# Patient Record
Sex: FEMALE | Race: BLACK OR AFRICAN AMERICAN | Employment: UNEMPLOYED | URBAN - METROPOLITAN AREA
[De-identification: names, ages, dates, MRNs, and addresses within clinical notes are randomized per-mention and may not be internally consistent; named-entity substitution may affect disease eponyms.]

---

## 2017-01-26 ENCOUNTER — OFFICE VISIT (OUTPATIENT)
Dept: CARDIOLOGY CLINIC | Age: 82
End: 2017-01-26

## 2017-01-26 VITALS
SYSTOLIC BLOOD PRESSURE: 147 MMHG | HEIGHT: 64 IN | HEART RATE: 58 BPM | WEIGHT: 182 LBS | BODY MASS INDEX: 31.07 KG/M2 | DIASTOLIC BLOOD PRESSURE: 59 MMHG

## 2017-01-26 DIAGNOSIS — E78.5 HYPERLIPIDEMIA, UNSPECIFIED HYPERLIPIDEMIA TYPE: ICD-10-CM

## 2017-01-26 DIAGNOSIS — E11.9 TYPE 2 DIABETES MELLITUS WITHOUT COMPLICATION, WITH LONG-TERM CURRENT USE OF INSULIN (HCC): ICD-10-CM

## 2017-01-26 DIAGNOSIS — I10 ESSENTIAL HYPERTENSION, BENIGN: ICD-10-CM

## 2017-01-26 DIAGNOSIS — Z79.4 TYPE 2 DIABETES MELLITUS WITHOUT COMPLICATION, WITH LONG-TERM CURRENT USE OF INSULIN (HCC): ICD-10-CM

## 2017-01-26 DIAGNOSIS — I05.9 MITRAL VALVE DISORDER: Primary | ICD-10-CM

## 2017-01-26 DIAGNOSIS — I47.29 NSVT (NONSUSTAINED VENTRICULAR TACHYCARDIA): ICD-10-CM

## 2017-01-26 RX ORDER — INDAPAMIDE 1.25 MG/1
TABLET, FILM COATED ORAL DAILY
COMMUNITY
End: 2020-01-01

## 2017-01-26 RX ORDER — DOXAZOSIN 2 MG/1
2 TABLET ORAL
COMMUNITY
End: 2020-01-01

## 2017-01-26 NOTE — PROGRESS NOTES
HISTORY OF PRESENT ILLNESS  Aura Lubin is a 80 y.o. female. HPI Comments: Patient with mr,nsvt,htn,hyperlipidemia. On follow up patient denies any chest pains,sob, palpitation or other significant symptoms. Hypertension   The history is provided by the patient. This is a chronic problem. The problem occurs constantly. The problem has been gradually worsening. Associated symptoms include shortness of breath. Pertinent negatives include no chest pain, no abdominal pain and no headaches. Valvular Heart Disease   This is a chronic problem. The problem occurs constantly. The problem has not changed since onset. Associated symptoms include shortness of breath. Pertinent negatives include no chest pain, no abdominal pain and no headaches. Cholesterol Problem   This is a chronic problem. The problem has not changed since onset. Associated symptoms include shortness of breath. Pertinent negatives include no chest pain, no abdominal pain and no headaches. Leg Swelling   Associated symptoms include shortness of breath. Pertinent negatives include no chest pain, no abdominal pain and no headaches. Palpitations    The history is provided by the patient. This is a chronic problem. The problem has been resolved. Associated symptoms include lower extremity edema and shortness of breath. Pertinent negatives include no fever, no chest pain, no claudication, no orthopnea, no PND, no abdominal pain, no nausea, no vomiting, no headaches, no dizziness, no weakness, no cough, no hemoptysis and no sputum production. Her past medical history is significant for hypertension. Review of Systems   Constitutional: Negative for chills and fever. HENT: Negative for nosebleeds. Eyes: Negative for blurred vision and double vision. Respiratory: Positive for shortness of breath. Negative for cough, hemoptysis, sputum production and wheezing. Cardiovascular: Positive for palpitations.  Negative for chest pain, orthopnea, claudication, leg swelling and PND. Gastrointestinal: Negative for abdominal pain, heartburn, nausea and vomiting. Musculoskeletal: Negative for myalgias. Skin: Negative for rash. Neurological: Negative for dizziness, weakness and headaches. Endo/Heme/Allergies: Does not bruise/bleed easily. Family History   Problem Relation Age of Onset    Heart Disease Other      Positive family history of ischemic heart disease. Past Medical History   Diagnosis Date    Essential hypertension, benign     Mitral valve disorders      Moderate MR    Other and unspecified hyperlipidemia     Other specified cardiac dysrhythmias(427.89)      Non-sustained VT on Holter in past    Type II or unspecified type diabetes mellitus without mention of complication, not stated as uncontrolled        No past surgical history on file. Allergies   Allergen Reactions    Minoxidil Other (comments)     edema       Current Outpatient Prescriptions   Medication Sig    indapamide (LOZOL) 1.25 mg tablet Take  by mouth daily.  doxazosin (CARDURA) 2 mg tablet Take 2 mg by mouth nightly.  traZODone (DESYREL) 50 mg tablet Take  by mouth nightly.  escitalopram oxalate (LEXAPRO) 10 mg tablet Take 10 mg by mouth daily.  losartan (COZAAR) 100 mg tablet Take 100 mg by mouth daily.  hydrALAZINE (APRESOLINE) 50 mg tablet Take 50 mg by mouth three (3) times daily.  atorvastatin (LIPITOR) 40 mg tablet Take 1 Tab by mouth daily.  ergocalciferol (ERGOCALCIFEROL) 50,000 unit capsule Take 50,000 Units by mouth every seven (7) days.  atenolol (TENORMIN) 100 mg tablet Take 100 mg by mouth two (2) times a day.  insulin glargine (LANTUS SOLOSTAR) 100 unit/mL (3 mL) pen by SubCUTAneous route.  insulin aspart (NOVOLOG) 100 unit/mL injection by SubCUTAneous route.  aspirin 81 mg tablet Take 81 mg by mouth.  sitaGLIPtin (JANUVIA) 100 mg tablet Take 100 mg by mouth daily.      No current facility-administered medications for this visit. Visit Vitals    /59    Pulse (!) 58    Ht 5' 4\" (1.626 m)    Wt 82.6 kg (182 lb)    BMI 31.24 kg/m2         Physical Exam   Constitutional: She is oriented to person, place, and time. She appears well-developed and well-nourished. HENT:   Head: Normocephalic and atraumatic. Eyes: Conjunctivae are normal.   Neck: Neck supple. No JVD present. No tracheal deviation present. No thyromegaly present. Cardiovascular: Normal rate and regular rhythm. PMI is not displaced. Exam reveals no gallop and no decreased pulses. Murmur heard. Pulmonary/Chest: No respiratory distress. She has no wheezes. She has no rales. She exhibits no tenderness. Abdominal: Soft. There is no tenderness. Musculoskeletal: She exhibits no edema. Neurological: She is alert and oriented to person, place, and time. Skin: Skin is warm. Psychiatric: She has a normal mood and affect. CARDIOLOGY STUDIES 10/1/2008 6/1/2008   Myocardial Perfusion Scan Result Normal scan and EF. -   Echocardiogram - Complete Result - Normal scan and EF with moderate MR and PAP 37mmHg   24 hr Holter Monitor Result Non-sustained VT -     SUMMARY:10/2015:echo  Left ventricle: Systolic function was normal. Ejection fraction was  estimated to be 60 %. There were no regional wall motion abnormalities. Posterior wall thickness was mildly increased. Doppler parameters were  consistent with abnormal left ventricular relaxation (grade 1 diastolic  dysfunction). Mitral valve: There was mild posterior annular calcification. There was  mild regurgitation. Aortic valve: The valve was trileaflet. Leaflets exhibited mildly  increased thickness, normal cuspal separation, and sclerosis. There was  trivial regurgitation. Assessment       ICD-10-CM ICD-9-CM    1. Mitral valve disorder I05.9 424.0     mr  mild stable  asymptomatic   2.  NSVT (nonsustained ventricular tachycardia) (formerly [facility]) I47.2 427.1     asymptomatic 3. Essential hypertension, benign I10 401.1     stable   4. Hyperlipidemia, unspecified hyperlipidemia type E78.5 272.4     lab with pcp   5. Type 2 diabetes mellitus without complication, with long-term current use of insulin (HCC) E11.9 250.00     Z79.4 V58.67      Minoxidil stopped due to edema    Medications Discontinued During This Encounter   Medication Reason    minoxidil (LONITEN) 2.5 mg tablet Other    minoxidil (LONITEN) 2.5 mg tablet Other    amLODIPine (NORVASC) 10 mg tablet Discontinued by Another Clinician       No orders of the defined types were placed in this encounter. Follow-up Disposition:  Return in about 6 months (around 7/26/2017).

## 2017-01-26 NOTE — LETTER
Quinton Stewart 2/15/1929 
 
1/26/2017 Dear Zelda Bob MD 
 
I had the pleasure of evaluating  Ms. Gabriela Rios in office today. Below are the relevant portions of my assessment and plan of care. ICD-10-CM ICD-9-CM 1. Mitral valve disorder I05.9 424.0   
 mr 
mild stable 
asymptomatic 2. NSVT (nonsustained ventricular tachycardia) ([facility]) I47.2 427.1   
 asymptomatic 3. Essential hypertension, benign I10 401.1   
 stable 4. Hyperlipidemia, unspecified hyperlipidemia type E78.5 272.4   
 lab with pcp 5. Type 2 diabetes mellitus without complication, with long-term current use of insulin ([facility]) E11.9 250.00   
 Z79.4 V58.67 Current Outpatient Prescriptions Medication Sig Dispense Refill  indapamide (LOZOL) 1.25 mg tablet Take  by mouth daily.  doxazosin (CARDURA) 2 mg tablet Take 2 mg by mouth nightly.  traZODone (DESYREL) 50 mg tablet Take  by mouth nightly.  escitalopram oxalate (LEXAPRO) 10 mg tablet Take 10 mg by mouth daily.  losartan (COZAAR) 100 mg tablet Take 100 mg by mouth daily.  hydrALAZINE (APRESOLINE) 50 mg tablet Take 50 mg by mouth three (3) times daily.  atorvastatin (LIPITOR) 40 mg tablet Take 1 Tab by mouth daily. 90 Tab 3  
 ergocalciferol (ERGOCALCIFEROL) 50,000 unit capsule Take 50,000 Units by mouth every seven (7) days.  atenolol (TENORMIN) 100 mg tablet Take 100 mg by mouth two (2) times a day.  insulin glargine (LANTUS SOLOSTAR) 100 unit/mL (3 mL) pen by SubCUTAneous route.  insulin aspart (NOVOLOG) 100 unit/mL injection by SubCUTAneous route.  aspirin 81 mg tablet Take 81 mg by mouth.  sitaGLIPtin (JANUVIA) 100 mg tablet Take 100 mg by mouth daily. Orders Placed This Encounter  indapamide (LOZOL) 1.25 mg tablet Sig: Take  by mouth daily.  doxazosin (CARDURA) 2 mg tablet Sig: Take 2 mg by mouth nightly. If you have questions, please do not hesitate to call me.   I look forward to following Ms. Ricks Stalker along with you. Sincerely, Jez Rome MD

## 2017-01-26 NOTE — MR AVS SNAPSHOT
Visit Information Date & Time Provider Department Dept. Phone Encounter #  
 1/26/2017 11:00 AM Asher Monroe MD [facility] [address] [place] [address] [id number] Follow-up Instructions Return in about 6 months (around 7/26/2017). Upcoming Health Maintenance Date Due  
 FOOT EXAM Q1 2/15/1939 EYE EXAM RETINAL OR DILATED Q1 2/15/1939 DTaP/Tdap/Td series (1 - Tdap) 2/15/1950 ZOSTER VACCINE AGE 60> 2/15/1989 GLAUCOMA SCREENING Q2Y 2/15/1994 OSTEOPOROSIS SCREENING (DEXA) 2/15/1994 Pneumococcal 65+ Low/Medium Risk (1 of 2 - PCV13) 2/15/1994 MEDICARE YEARLY EXAM 2/15/1994 HEMOGLOBIN A1C Q6M 12/7/2010 MICROALBUMIN Q1 6/7/2011 LIPID PANEL Q1 6/7/2011 INFLUENZA AGE 9 TO ADULT 8/1/2016 Allergies as of 1/26/2017  Review Complete On: 1/26/2017 By: Asher Monroe MD  
  
 Severity Noted Reaction Type Reactions Minoxidil  01/26/2017    Other (comments) edema Current Immunizations  Never Reviewed No immunizations on file. Not reviewed this visit You Were Diagnosed With   
  
 Codes Comments Mitral valve disorder    -  Primary ICD-10-CM: I05.9 ICD-9-CM: 424.0 mr 
mild stable 
asymptomatic NSVT (nonsustained ventricular tachycardia) (HCC)     ICD-10-CM: I47.2 ICD-9-CM: 427.1 asymptomatic Essential hypertension, benign     ICD-10-CM: I10 
ICD-9-CM: 401.1 stable Hyperlipidemia, unspecified hyperlipidemia type     ICD-10-CM: E78.5 ICD-9-CM: 272.4 lab with pcp Type 2 diabetes mellitus without complication, with long-term current use of insulin (HCC)     ICD-10-CM: E11.9, Z79.4 ICD-9-CM: 250.00, V58.67 Vitals BP Pulse Height(growth percentile) Weight(growth percentile) BMI Smoking Status 147/59 (!) 58 5' 4\" (1.626 m) 182 lb (82.6 kg) 31.24 kg/m2 Never Smoker Vitals History BMI and BSA Data Body Mass Index Body Surface Area  
 31.24 kg/m 2 1.93 m 2 Preferred Pharmacy Pharmacy Name Phone [address] [facility] [address], [address] [place] [address] Road [phone] Your Updated Medication List  
  
   
This list is accurate as of: 1/26/17 12:04 PM.  Always use your most recent med list.  
  
  
  
  
 aspirin 81 mg tablet Take 81 mg by mouth. atenolol 100 mg tablet Commonly known as:  TENORMIN Take 100 mg by mouth two (2) times a day. atorvastatin 40 mg tablet Commonly known as:  LIPITOR Take 1 Tab by mouth daily. doxazosin 2 mg tablet Commonly known as:  CARDURA Take 2 mg by mouth nightly.  
  
 ergocalciferol 50,000 unit capsule Commonly known as:  ERGOCALCIFEROL Take 50,000 Units by mouth every seven (7) days. escitalopram oxalate 10 mg tablet Commonly known as:  Paulette Mario Alberto Take 10 mg by mouth daily. hydrALAZINE 50 mg tablet Commonly known as:  APRESOLINE Take 50 mg by mouth three (3) times daily. indapamide 1.25 mg tablet Commonly known as:  Senait Shy Take  by mouth daily. JANUVIA 100 mg tablet Generic drug:  SITagliptin Take 100 mg by mouth daily. LANTUS SOLOSTAR 100 unit/mL (3 mL) pen Generic drug:  insulin glargine  
by SubCUTAneous route. losartan 100 mg tablet Commonly known as:  COZAAR Take 100 mg by mouth daily. NovoLOG 100 unit/mL injection Generic drug:  insulin aspart  
by SubCUTAneous route. traZODone 50 mg tablet Commonly known as:  Annette Uzma Take  by mouth nightly. Follow-up Instructions Return in about 6 months (around 7/26/2017). Introducing hospitals & HEALTH SERVICES! Pato Fine introduces [employer] patient portal. Now you can access parts of your medical record, email your doctor's office, and request medication refills online. 1. In your internet browser, go to [email or web address] 2. Click on the First Time User? Click Here link in the Sign In box. You will see the New Member Sign Up page. 3. Enter your [employer] Access Code exactly as it appears below. You will not need to use this code after youve completed the sign-up process. If you do not sign up before the expiration date, you must request a new code. · [employer] Access Code: [email or web address] Expires: 4/26/2017 11:43 AM 
 
4. Enter the last four digits of your Social Security Number (xxxx) and Date of Birth (mm/dd/yyyy) as indicated and click Submit. You will be taken to the next sign-up page. 5. Create a [employer] ID. This will be your [employer] login ID and cannot be changed, so think of one that is secure and easy to remember. 6. Create a [employer] password. You can change your password at any time. 7. Enter your Password Reset Question and Answer. This can be used at a later time if you forget your password. 8. Enter your e-mail address. You will receive e-mail notification when new information is available in [address]. 9. Click Sign Up. You can now view and download portions of your medical record. 10. Click the Download Summary menu link to download a portable copy of your medical information. If you have questions, please visit the Frequently Asked Questions section of the [employer] website. Remember, [employer] is NOT to be used for urgent needs. For medical emergencies, dial 911. Now available from your iPhone and Android! Please provide this summary of care documentation to your next provider. Your primary care clinician is listed as OLI RODRIGUEZ. If you have any questions after today's visit, please call [phone].

## 2017-01-26 NOTE — PROGRESS NOTES
1. Have you been to the ER, urgent care clinic since your last visit? Hospitalized since your last visit? No    2. Have you seen or consulted any other health care providers outside of the [address] [place] [address] since your last visit? Include any pap smears or colon screening. No     3. Since your last visit, have you had any of the following symptoms? Swelling in legs    4. Have you had any blood work, X-rays or cardiac testing?     none    5. Where do you normally have your labs drawn?  pcp    6. Do you need any refills today?  no    Medications confirmed by patient's med list

## 2020-01-01 ENCOUNTER — APPOINTMENT (OUTPATIENT)
Dept: GENERAL RADIOLOGY | Age: 85
DRG: 177 | End: 2020-01-01
Attending: EMERGENCY MEDICINE
Payer: MEDICARE

## 2020-01-01 ENCOUNTER — APPOINTMENT (OUTPATIENT)
Dept: CT IMAGING | Age: 85
DRG: 177 | End: 2020-01-01
Attending: FAMILY MEDICINE
Payer: MEDICARE

## 2020-01-01 ENCOUNTER — APPOINTMENT (OUTPATIENT)
Dept: NON INVASIVE DIAGNOSTICS | Age: 85
DRG: 177 | End: 2020-01-01
Attending: EMERGENCY MEDICINE
Payer: MEDICARE

## 2020-01-01 ENCOUNTER — APPOINTMENT (OUTPATIENT)
Dept: GENERAL RADIOLOGY | Age: 85
DRG: 177 | End: 2020-01-01
Attending: INTERNAL MEDICINE
Payer: MEDICARE

## 2020-01-01 ENCOUNTER — HOME CARE VISIT (OUTPATIENT)
Dept: HOSPICE | Facility: HOSPICE | Age: 85
End: 2020-01-01

## 2020-01-01 ENCOUNTER — APPOINTMENT (OUTPATIENT)
Dept: CT IMAGING | Age: 85
DRG: 177 | End: 2020-01-01
Attending: EMERGENCY MEDICINE
Payer: MEDICARE

## 2020-01-01 ENCOUNTER — HOSPITAL ENCOUNTER (EMERGENCY)
Age: 85
Discharge: LEFT BEFORE DISCHARGE | End: 2020-07-19
Attending: EMERGENCY MEDICINE
Payer: MEDICARE

## 2020-01-01 ENCOUNTER — HOSPITAL ENCOUNTER (INPATIENT)
Age: 85
LOS: 29 days | Discharge: HOSPICE | DRG: 177 | End: 2020-08-18
Attending: EMERGENCY MEDICINE | Admitting: HOSPITALIST
Payer: MEDICARE

## 2020-01-01 ENCOUNTER — HOSPICE ADMISSION (OUTPATIENT)
Dept: HOSPICE | Facility: HOSPICE | Age: 85
End: 2020-01-01

## 2020-01-01 VITALS
BODY MASS INDEX: 26.71 KG/M2 | RESPIRATION RATE: 15 BRPM | WEIGHT: 166.2 LBS | TEMPERATURE: 96.8 F | HEIGHT: 66 IN | HEART RATE: 70 BPM | SYSTOLIC BLOOD PRESSURE: 134 MMHG | OXYGEN SATURATION: 70 % | DIASTOLIC BLOOD PRESSURE: 54 MMHG

## 2020-01-01 DIAGNOSIS — U07.1 COVID-19: ICD-10-CM

## 2020-01-01 DIAGNOSIS — Z71.89 GOALS OF CARE, COUNSELING/DISCUSSION: ICD-10-CM

## 2020-01-01 DIAGNOSIS — I50.20 SYSTOLIC CONGESTIVE HEART FAILURE, UNSPECIFIED HF CHRONICITY (HCC): ICD-10-CM

## 2020-01-01 DIAGNOSIS — N28.9 RENAL INSUFFICIENCY: ICD-10-CM

## 2020-01-01 DIAGNOSIS — I21.4 NSTEMI (NON-ST ELEVATED MYOCARDIAL INFARCTION) (HCC): ICD-10-CM

## 2020-01-01 DIAGNOSIS — J18.9 PNEUMONIA DUE TO INFECTIOUS ORGANISM, UNSPECIFIED LATERALITY, UNSPECIFIED PART OF LUNG: ICD-10-CM

## 2020-01-01 DIAGNOSIS — I21.4 NON-STEMI (NON-ST ELEVATED MYOCARDIAL INFARCTION) (HCC): Primary | ICD-10-CM

## 2020-01-01 LAB
ALBUMIN SERPL-MCNC: 2.5 G/DL (ref 3.4–5)
ALBUMIN SERPL-MCNC: 2.6 G/DL (ref 3.4–5)
ALBUMIN SERPL-MCNC: 2.7 G/DL (ref 3.4–5)
ALBUMIN SERPL-MCNC: 2.8 G/DL (ref 3.4–5)
ALBUMIN SERPL-MCNC: 2.8 G/DL (ref 3.4–5)
ALBUMIN/GLOB SERPL: 0.5 {RATIO} (ref 0.8–1.7)
ALBUMIN/GLOB SERPL: 0.6 {RATIO} (ref 0.8–1.7)
ALBUMIN/GLOB SERPL: 0.6 {RATIO} (ref 0.8–1.7)
ALBUMIN/GLOB SERPL: 0.7 {RATIO} (ref 0.8–1.7)
ALBUMIN/GLOB SERPL: 0.8 {RATIO} (ref 0.8–1.7)
ALP SERPL-CCNC: 59 U/L (ref 45–117)
ALP SERPL-CCNC: 63 U/L (ref 45–117)
ALP SERPL-CCNC: 64 U/L (ref 45–117)
ALP SERPL-CCNC: 64 U/L (ref 45–117)
ALP SERPL-CCNC: 66 U/L (ref 45–117)
ALT SERPL-CCNC: 37 U/L (ref 13–56)
ALT SERPL-CCNC: 46 U/L (ref 13–56)
ALT SERPL-CCNC: 60 U/L (ref 13–56)
ALT SERPL-CCNC: 78 U/L (ref 13–56)
ALT SERPL-CCNC: 91 U/L (ref 13–56)
ANION GAP SERPL CALC-SCNC: 10 MMOL/L (ref 3–18)
ANION GAP SERPL CALC-SCNC: 11 MMOL/L (ref 3–18)
ANION GAP SERPL CALC-SCNC: 13 MMOL/L (ref 3–18)
ANION GAP SERPL CALC-SCNC: 4 MMOL/L (ref 3–18)
ANION GAP SERPL CALC-SCNC: 4 MMOL/L (ref 3–18)
ANION GAP SERPL CALC-SCNC: 5 MMOL/L (ref 3–18)
ANION GAP SERPL CALC-SCNC: 6 MMOL/L (ref 3–18)
ANION GAP SERPL CALC-SCNC: 7 MMOL/L (ref 3–18)
ANION GAP SERPL CALC-SCNC: 8 MMOL/L (ref 3–18)
ANION GAP SERPL CALC-SCNC: 9 MMOL/L (ref 3–18)
ANION GAP SERPL CALC-SCNC: 9 MMOL/L (ref 3–18)
APPEARANCE UR: CLEAR
APTT PPP: 174.1 SEC (ref 23–36.4)
APTT PPP: 24.6 SEC (ref 23–36.4)
APTT PPP: 24.9 SEC (ref 23–36.4)
APTT PPP: 27.5 SEC (ref 23–36.4)
APTT PPP: 28.5 SEC (ref 23–36.4)
APTT PPP: 29.8 SEC (ref 23–36.4)
APTT PPP: 30 SEC (ref 23–36.4)
APTT PPP: 38.5 SEC (ref 23–36.4)
APTT PPP: 54.2 SEC (ref 23–36.4)
APTT PPP: 62.8 SEC (ref 23–36.4)
APTT PPP: 88.4 SEC (ref 23–36.4)
APTT PPP: 90.9 SEC (ref 23–36.4)
APTT PPP: >180 SEC (ref 23–36.4)
APTT PPP: NORMAL SEC (ref 23–36.4)
AST SERPL-CCNC: 124 U/L (ref 10–38)
AST SERPL-CCNC: 52 U/L (ref 10–38)
AST SERPL-CCNC: 85 U/L (ref 10–38)
AST SERPL-CCNC: 96 U/L (ref 10–38)
AST SERPL-CCNC: 99 U/L (ref 10–38)
ATRIAL RATE: 53 BPM
BACTERIA URNS QL MICRO: ABNORMAL /HPF
BASOPHILS # BLD: 0 K/UL (ref 0–0.06)
BASOPHILS # BLD: 0 K/UL (ref 0–0.1)
BASOPHILS # BLD: 0 K/UL (ref 0–0.1)
BASOPHILS # BLD: 0.1 K/UL (ref 0–0.06)
BASOPHILS # BLD: 0.1 K/UL (ref 0–0.1)
BASOPHILS NFR BLD: 0 % (ref 0–2)
BASOPHILS NFR BLD: 0 % (ref 0–2)
BASOPHILS NFR BLD: 0 % (ref 0–3)
BASOPHILS NFR BLD: 1 % (ref 0–2)
BASOPHILS NFR BLD: 1 % (ref 0–3)
BILIRUB SERPL-MCNC: 0.9 MG/DL (ref 0.2–1)
BILIRUB SERPL-MCNC: 1 MG/DL (ref 0.2–1)
BILIRUB SERPL-MCNC: 1.1 MG/DL (ref 0.2–1)
BILIRUB SERPL-MCNC: 1.1 MG/DL (ref 0.2–1)
BILIRUB SERPL-MCNC: 1.7 MG/DL (ref 0.2–1)
BILIRUB UR QL: NEGATIVE
BNP SERPL-MCNC: 3848 PG/ML (ref 0–1800)
BUN SERPL-MCNC: 26 MG/DL (ref 7–18)
BUN SERPL-MCNC: 28 MG/DL (ref 7–18)
BUN SERPL-MCNC: 28 MG/DL (ref 7–18)
BUN SERPL-MCNC: 41 MG/DL (ref 7–18)
BUN SERPL-MCNC: 45 MG/DL (ref 7–18)
BUN SERPL-MCNC: 48 MG/DL (ref 7–18)
BUN SERPL-MCNC: 49 MG/DL (ref 7–18)
BUN SERPL-MCNC: 49 MG/DL (ref 7–18)
BUN SERPL-MCNC: 51 MG/DL (ref 7–18)
BUN SERPL-MCNC: 52 MG/DL (ref 7–18)
BUN SERPL-MCNC: 53 MG/DL (ref 7–18)
BUN SERPL-MCNC: 54 MG/DL (ref 7–18)
BUN SERPL-MCNC: 54 MG/DL (ref 7–18)
BUN SERPL-MCNC: 56 MG/DL (ref 7–18)
BUN SERPL-MCNC: 62 MG/DL (ref 7–18)
BUN SERPL-MCNC: 63 MG/DL (ref 7–18)
BUN/CREAT SERPL: 15 (ref 12–20)
BUN/CREAT SERPL: 17 (ref 12–20)
BUN/CREAT SERPL: 17 (ref 12–20)
BUN/CREAT SERPL: 23 (ref 12–20)
BUN/CREAT SERPL: 25 (ref 12–20)
BUN/CREAT SERPL: 26 (ref 12–20)
BUN/CREAT SERPL: 26 (ref 12–20)
BUN/CREAT SERPL: 28 (ref 12–20)
BUN/CREAT SERPL: 28 (ref 12–20)
BUN/CREAT SERPL: 30 (ref 12–20)
BUN/CREAT SERPL: 32 (ref 12–20)
BUN/CREAT SERPL: 33 (ref 12–20)
BUN/CREAT SERPL: 33 (ref 12–20)
BUN/CREAT SERPL: 35 (ref 12–20)
CALCIUM SERPL-MCNC: 7.2 MG/DL (ref 8.5–10.1)
CALCIUM SERPL-MCNC: 7.7 MG/DL (ref 8.5–10.1)
CALCIUM SERPL-MCNC: 8.3 MG/DL (ref 8.5–10.1)
CALCIUM SERPL-MCNC: 8.5 MG/DL (ref 8.5–10.1)
CALCIUM SERPL-MCNC: 8.6 MG/DL (ref 8.5–10.1)
CALCIUM SERPL-MCNC: 8.8 MG/DL (ref 8.5–10.1)
CALCIUM SERPL-MCNC: 8.8 MG/DL (ref 8.5–10.1)
CALCIUM SERPL-MCNC: 8.9 MG/DL (ref 8.5–10.1)
CALCIUM SERPL-MCNC: 8.9 MG/DL (ref 8.5–10.1)
CALCIUM SERPL-MCNC: 9.2 MG/DL (ref 8.5–10.1)
CALCIUM SERPL-MCNC: 9.3 MG/DL (ref 8.5–10.1)
CALCIUM SERPL-MCNC: 9.4 MG/DL (ref 8.5–10.1)
CALCIUM SERPL-MCNC: 9.4 MG/DL (ref 8.5–10.1)
CALCULATED P AXIS, ECG09: 55 DEGREES
CALCULATED R AXIS, ECG10: -41 DEGREES
CALCULATED T AXIS, ECG11: -71 DEGREES
CHLORIDE SERPL-SCNC: 100 MMOL/L (ref 100–111)
CHLORIDE SERPL-SCNC: 105 MMOL/L (ref 100–111)
CHLORIDE SERPL-SCNC: 107 MMOL/L (ref 100–111)
CHLORIDE SERPL-SCNC: 109 MMOL/L (ref 100–111)
CHLORIDE SERPL-SCNC: 112 MMOL/L (ref 100–111)
CHLORIDE SERPL-SCNC: 112 MMOL/L (ref 100–111)
CHLORIDE SERPL-SCNC: 113 MMOL/L (ref 100–111)
CHLORIDE SERPL-SCNC: 117 MMOL/L (ref 100–111)
CHLORIDE SERPL-SCNC: 118 MMOL/L (ref 100–111)
CHLORIDE SERPL-SCNC: 122 MMOL/L (ref 100–111)
CHLORIDE SERPL-SCNC: 124 MMOL/L (ref 100–111)
CHLORIDE SERPL-SCNC: 125 MMOL/L (ref 100–111)
CHLORIDE SERPL-SCNC: 125 MMOL/L (ref 100–111)
CHLORIDE SERPL-SCNC: 127 MMOL/L (ref 100–111)
CHLORIDE SERPL-SCNC: 129 MMOL/L (ref 100–111)
CHLORIDE SERPL-SCNC: 129 MMOL/L (ref 100–111)
CHOLEST SERPL-MCNC: 108 MG/DL
CK MB CFR SERPL CALC: 1.2 % (ref 0–4)
CK MB SERPL-MCNC: 4.6 NG/ML (ref 5–25)
CK SERPL-CCNC: 151 U/L (ref 26–192)
CK SERPL-CCNC: 371 U/L (ref 26–192)
CO2 SERPL-SCNC: 18 MMOL/L (ref 21–32)
CO2 SERPL-SCNC: 19 MMOL/L (ref 21–32)
CO2 SERPL-SCNC: 20 MMOL/L (ref 21–32)
CO2 SERPL-SCNC: 22 MMOL/L (ref 21–32)
CO2 SERPL-SCNC: 23 MMOL/L (ref 21–32)
CO2 SERPL-SCNC: 24 MMOL/L (ref 21–32)
CO2 SERPL-SCNC: 25 MMOL/L (ref 21–32)
CO2 SERPL-SCNC: 25 MMOL/L (ref 21–32)
CO2 SERPL-SCNC: 26 MMOL/L (ref 21–32)
COLOR UR: YELLOW
COVID-19 RAPID TEST, COVR: DETECTED
CREAT SERPL-MCNC: 1.54 MG/DL (ref 0.6–1.3)
CREAT SERPL-MCNC: 1.61 MG/DL (ref 0.6–1.3)
CREAT SERPL-MCNC: 1.62 MG/DL (ref 0.6–1.3)
CREAT SERPL-MCNC: 1.65 MG/DL (ref 0.6–1.3)
CREAT SERPL-MCNC: 1.69 MG/DL (ref 0.6–1.3)
CREAT SERPL-MCNC: 1.72 MG/DL (ref 0.6–1.3)
CREAT SERPL-MCNC: 1.74 MG/DL (ref 0.6–1.3)
CREAT SERPL-MCNC: 1.79 MG/DL (ref 0.6–1.3)
CREAT SERPL-MCNC: 1.87 MG/DL (ref 0.6–1.3)
CREAT SERPL-MCNC: 1.88 MG/DL (ref 0.6–1.3)
CREAT SERPL-MCNC: 1.89 MG/DL (ref 0.6–1.3)
CREAT SERPL-MCNC: 1.95 MG/DL (ref 0.6–1.3)
CREAT SERPL-MCNC: 1.96 MG/DL (ref 0.6–1.3)
CREAT SERPL-MCNC: 2.2 MG/DL (ref 0.6–1.3)
CRP SERPL-MCNC: 0.4 MG/DL (ref 0–0.3)
CRP SERPL-MCNC: 0.5 MG/DL (ref 0–0.3)
CRP SERPL-MCNC: 0.6 MG/DL (ref 0–0.3)
CRP SERPL-MCNC: 0.6 MG/DL (ref 0–0.3)
CRP SERPL-MCNC: 0.7 MG/DL (ref 0–0.3)
CRP SERPL-MCNC: 0.8 MG/DL (ref 0–0.3)
CRP SERPL-MCNC: 0.8 MG/DL (ref 0–0.3)
CRP SERPL-MCNC: 1.1 MG/DL (ref 0–0.3)
CRP SERPL-MCNC: 1.5 MG/DL (ref 0–0.3)
CRP SERPL-MCNC: 1.8 MG/DL (ref 0–0.3)
CRP SERPL-MCNC: 3.1 MG/DL (ref 0–0.3)
CRP SERPL-MCNC: 5 MG/DL (ref 0–0.3)
CRP SERPL-MCNC: 8.6 MG/DL (ref 0–0.3)
CRP SERPL-MCNC: 8.6 MG/DL (ref 0–0.3)
D DIMER PPP FEU-MCNC: 0.79 UG/ML(FEU)
D DIMER PPP FEU-MCNC: 0.81 UG/ML(FEU)
D DIMER PPP FEU-MCNC: 0.82 UG/ML(FEU)
D DIMER PPP FEU-MCNC: 0.9 UG/ML(FEU)
D DIMER PPP FEU-MCNC: 0.9 UG/ML(FEU)
D DIMER PPP FEU-MCNC: 1.04 UG/ML(FEU)
D DIMER PPP FEU-MCNC: 1.04 UG/ML(FEU)
D DIMER PPP FEU-MCNC: 1.06 UG/ML(FEU)
D DIMER PPP FEU-MCNC: 1.22 UG/ML(FEU)
D DIMER PPP FEU-MCNC: 1.53 UG/ML(FEU)
D DIMER PPP FEU-MCNC: 2.15 UG/ML(FEU)
D DIMER PPP FEU-MCNC: 3.84 UG/ML(FEU)
D DIMER PPP FEU-MCNC: 3.99 UG/ML(FEU)
DIAGNOSIS, 93000: NORMAL
DIFFERENTIAL METHOD BLD: ABNORMAL
ECHO LV INTERNAL DIMENSION DIASTOLIC: 3.76 CM (ref 3.9–5.3)
ECHO LV INTERNAL DIMENSION SYSTOLIC: 2.34 CM
ECHO LV IVSD: 1.5 CM (ref 0.6–0.9)
ECHO LV MASS 2D: 202.2 G (ref 67–162)
ECHO LV MASS INDEX 2D: 105.7 G/M2 (ref 43–95)
ECHO LV POSTERIOR WALL DIASTOLIC: 1.4 CM (ref 0.6–0.9)
EOSINOPHIL # BLD: 0 K/UL (ref 0–0.4)
EOSINOPHIL NFR BLD: 0 % (ref 0–5)
EPITH CASTS URNS QL MICRO: ABNORMAL /LPF (ref 0–5)
ERYTHROCYTE [DISTWIDTH] IN BLOOD BY AUTOMATED COUNT: 14.2 % (ref 11.6–14.5)
ERYTHROCYTE [DISTWIDTH] IN BLOOD BY AUTOMATED COUNT: 14.3 % (ref 11.6–14.5)
ERYTHROCYTE [DISTWIDTH] IN BLOOD BY AUTOMATED COUNT: 14.3 % (ref 11.6–14.5)
ERYTHROCYTE [DISTWIDTH] IN BLOOD BY AUTOMATED COUNT: 14.9 % (ref 11.6–14.5)
ERYTHROCYTE [DISTWIDTH] IN BLOOD BY AUTOMATED COUNT: 16.9 % (ref 11.6–14.5)
EST. AVERAGE GLUCOSE BLD GHB EST-MCNC: 151 MG/DL
FERRITIN SERPL-MCNC: 184 NG/ML (ref 8–388)
FERRITIN SERPL-MCNC: 205 NG/ML (ref 8–388)
FERRITIN SERPL-MCNC: 223 NG/ML (ref 8–388)
FERRITIN SERPL-MCNC: 243 NG/ML (ref 8–388)
FERRITIN SERPL-MCNC: 257 NG/ML (ref 8–388)
FERRITIN SERPL-MCNC: 290 NG/ML (ref 8–388)
FERRITIN SERPL-MCNC: 290 NG/ML (ref 8–388)
FERRITIN SERPL-MCNC: 308 NG/ML (ref 8–388)
FERRITIN SERPL-MCNC: 408 NG/ML (ref 8–388)
FERRITIN SERPL-MCNC: 474 NG/ML (ref 8–388)
FERRITIN SERPL-MCNC: 528 NG/ML (ref 8–388)
FERRITIN SERPL-MCNC: 603 NG/ML (ref 8–388)
FERRITIN SERPL-MCNC: 656 NG/ML (ref 8–388)
FERRITIN SERPL-MCNC: 760 NG/ML (ref 8–388)
GLOBULIN SER CALC-MCNC: 3.7 G/DL (ref 2–4)
GLOBULIN SER CALC-MCNC: 4.1 G/DL (ref 2–4)
GLOBULIN SER CALC-MCNC: 4.2 G/DL (ref 2–4)
GLOBULIN SER CALC-MCNC: 4.8 G/DL (ref 2–4)
GLOBULIN SER CALC-MCNC: 4.9 G/DL (ref 2–4)
GLUCOSE BLD STRIP.AUTO-MCNC: 100 MG/DL (ref 70–110)
GLUCOSE BLD STRIP.AUTO-MCNC: 100 MG/DL (ref 70–110)
GLUCOSE BLD STRIP.AUTO-MCNC: 102 MG/DL (ref 70–110)
GLUCOSE BLD STRIP.AUTO-MCNC: 105 MG/DL (ref 70–110)
GLUCOSE BLD STRIP.AUTO-MCNC: 112 MG/DL (ref 70–110)
GLUCOSE BLD STRIP.AUTO-MCNC: 115 MG/DL (ref 70–110)
GLUCOSE BLD STRIP.AUTO-MCNC: 123 MG/DL (ref 70–110)
GLUCOSE BLD STRIP.AUTO-MCNC: 132 MG/DL (ref 70–110)
GLUCOSE BLD STRIP.AUTO-MCNC: 134 MG/DL (ref 70–110)
GLUCOSE BLD STRIP.AUTO-MCNC: 135 MG/DL (ref 70–110)
GLUCOSE BLD STRIP.AUTO-MCNC: 139 MG/DL (ref 70–110)
GLUCOSE BLD STRIP.AUTO-MCNC: 141 MG/DL (ref 70–110)
GLUCOSE BLD STRIP.AUTO-MCNC: 149 MG/DL (ref 70–110)
GLUCOSE BLD STRIP.AUTO-MCNC: 151 MG/DL (ref 70–110)
GLUCOSE BLD STRIP.AUTO-MCNC: 153 MG/DL (ref 70–110)
GLUCOSE BLD STRIP.AUTO-MCNC: 154 MG/DL (ref 70–110)
GLUCOSE BLD STRIP.AUTO-MCNC: 159 MG/DL (ref 70–110)
GLUCOSE BLD STRIP.AUTO-MCNC: 160 MG/DL (ref 70–110)
GLUCOSE BLD STRIP.AUTO-MCNC: 163 MG/DL (ref 70–110)
GLUCOSE BLD STRIP.AUTO-MCNC: 165 MG/DL (ref 70–110)
GLUCOSE BLD STRIP.AUTO-MCNC: 166 MG/DL (ref 70–110)
GLUCOSE BLD STRIP.AUTO-MCNC: 167 MG/DL (ref 70–110)
GLUCOSE BLD STRIP.AUTO-MCNC: 168 MG/DL (ref 70–110)
GLUCOSE BLD STRIP.AUTO-MCNC: 170 MG/DL (ref 70–110)
GLUCOSE BLD STRIP.AUTO-MCNC: 170 MG/DL (ref 70–110)
GLUCOSE BLD STRIP.AUTO-MCNC: 174 MG/DL (ref 70–110)
GLUCOSE BLD STRIP.AUTO-MCNC: 175 MG/DL (ref 70–110)
GLUCOSE BLD STRIP.AUTO-MCNC: 178 MG/DL (ref 70–110)
GLUCOSE BLD STRIP.AUTO-MCNC: 180 MG/DL (ref 70–110)
GLUCOSE BLD STRIP.AUTO-MCNC: 181 MG/DL (ref 70–110)
GLUCOSE BLD STRIP.AUTO-MCNC: 186 MG/DL (ref 70–110)
GLUCOSE BLD STRIP.AUTO-MCNC: 186 MG/DL (ref 70–110)
GLUCOSE BLD STRIP.AUTO-MCNC: 187 MG/DL (ref 70–110)
GLUCOSE BLD STRIP.AUTO-MCNC: 188 MG/DL (ref 70–110)
GLUCOSE BLD STRIP.AUTO-MCNC: 190 MG/DL (ref 70–110)
GLUCOSE BLD STRIP.AUTO-MCNC: 192 MG/DL (ref 70–110)
GLUCOSE BLD STRIP.AUTO-MCNC: 192 MG/DL (ref 70–110)
GLUCOSE BLD STRIP.AUTO-MCNC: 195 MG/DL (ref 70–110)
GLUCOSE BLD STRIP.AUTO-MCNC: 195 MG/DL (ref 70–110)
GLUCOSE BLD STRIP.AUTO-MCNC: 196 MG/DL (ref 70–110)
GLUCOSE BLD STRIP.AUTO-MCNC: 196 MG/DL (ref 70–110)
GLUCOSE BLD STRIP.AUTO-MCNC: 198 MG/DL (ref 70–110)
GLUCOSE BLD STRIP.AUTO-MCNC: 199 MG/DL (ref 70–110)
GLUCOSE BLD STRIP.AUTO-MCNC: 201 MG/DL (ref 70–110)
GLUCOSE BLD STRIP.AUTO-MCNC: 207 MG/DL (ref 70–110)
GLUCOSE BLD STRIP.AUTO-MCNC: 208 MG/DL (ref 70–110)
GLUCOSE BLD STRIP.AUTO-MCNC: 208 MG/DL (ref 70–110)
GLUCOSE BLD STRIP.AUTO-MCNC: 224 MG/DL (ref 70–110)
GLUCOSE BLD STRIP.AUTO-MCNC: 229 MG/DL (ref 70–110)
GLUCOSE BLD STRIP.AUTO-MCNC: 232 MG/DL (ref 70–110)
GLUCOSE BLD STRIP.AUTO-MCNC: 240 MG/DL (ref 70–110)
GLUCOSE BLD STRIP.AUTO-MCNC: 349 MG/DL (ref 70–110)
GLUCOSE BLD STRIP.AUTO-MCNC: 77 MG/DL (ref 70–110)
GLUCOSE BLD STRIP.AUTO-MCNC: 86 MG/DL (ref 70–110)
GLUCOSE BLD STRIP.AUTO-MCNC: 87 MG/DL (ref 70–110)
GLUCOSE BLD STRIP.AUTO-MCNC: 87 MG/DL (ref 70–110)
GLUCOSE BLD STRIP.AUTO-MCNC: 89 MG/DL (ref 70–110)
GLUCOSE BLD STRIP.AUTO-MCNC: 98 MG/DL (ref 70–110)
GLUCOSE SERPL-MCNC: 106 MG/DL (ref 74–99)
GLUCOSE SERPL-MCNC: 111 MG/DL (ref 74–99)
GLUCOSE SERPL-MCNC: 113 MG/DL (ref 74–99)
GLUCOSE SERPL-MCNC: 115 MG/DL (ref 74–99)
GLUCOSE SERPL-MCNC: 136 MG/DL (ref 74–99)
GLUCOSE SERPL-MCNC: 140 MG/DL (ref 74–99)
GLUCOSE SERPL-MCNC: 145 MG/DL (ref 74–99)
GLUCOSE SERPL-MCNC: 147 MG/DL (ref 74–99)
GLUCOSE SERPL-MCNC: 149 MG/DL (ref 74–99)
GLUCOSE SERPL-MCNC: 160 MG/DL (ref 74–99)
GLUCOSE SERPL-MCNC: 160 MG/DL (ref 74–99)
GLUCOSE SERPL-MCNC: 181 MG/DL (ref 74–99)
GLUCOSE SERPL-MCNC: 193 MG/DL (ref 74–99)
GLUCOSE SERPL-MCNC: 194 MG/DL (ref 74–99)
GLUCOSE SERPL-MCNC: 323 MG/DL (ref 74–99)
GLUCOSE SERPL-MCNC: 367 MG/DL (ref 74–99)
GLUCOSE UR STRIP.AUTO-MCNC: NEGATIVE MG/DL
HBA1C MFR BLD: 6.9 % (ref 4.2–5.6)
HCT VFR BLD AUTO: 19.5 % (ref 35–45)
HCT VFR BLD AUTO: 35.3 % (ref 35–45)
HCT VFR BLD AUTO: 35.7 % (ref 35–45)
HCT VFR BLD AUTO: 37.2 % (ref 35–45)
HCT VFR BLD AUTO: 38.4 % (ref 35–45)
HCT VFR BLD AUTO: 38.6 % (ref 35–45)
HCT VFR BLD AUTO: 39 % (ref 35–45)
HCT VFR BLD AUTO: 40 % (ref 35–45)
HCT VFR BLD AUTO: 43.2 % (ref 35–45)
HDLC SERPL-MCNC: 34 MG/DL (ref 40–60)
HDLC SERPL: 3.2 {RATIO} (ref 0–5)
HEMOCCULT STL QL: POSITIVE
HGB BLD-MCNC: 11.4 G/DL (ref 12–16)
HGB BLD-MCNC: 11.7 G/DL (ref 12–16)
HGB BLD-MCNC: 12.7 G/DL (ref 12–16)
HGB BLD-MCNC: 12.7 G/DL (ref 12–16)
HGB BLD-MCNC: 12.8 G/DL (ref 12–16)
HGB BLD-MCNC: 13 G/DL (ref 12–16)
HGB BLD-MCNC: 13.2 G/DL (ref 12–16)
HGB BLD-MCNC: 14.1 G/DL (ref 12–16)
HGB BLD-MCNC: 6.1 G/DL (ref 12–16)
HGB UR QL STRIP: NEGATIVE
INR PPP: 1 (ref 0.8–1.2)
INR PPP: 1.4 (ref 0.8–1.2)
KETONES UR QL STRIP.AUTO: NEGATIVE MG/DL
LDH SERPL L TO P-CCNC: 496 U/L (ref 81–234)
LDH SERPL L TO P-CCNC: 539 U/L (ref 81–234)
LDLC SERPL CALC-MCNC: 43.8 MG/DL (ref 0–100)
LEUKOCYTE ESTERASE UR QL STRIP.AUTO: NEGATIVE
LIPASE SERPL-CCNC: 558 U/L (ref 73–393)
LIPID PROFILE,FLP: ABNORMAL
LMWH PPP CHRO-ACNC: 1.21 (ref 0.3–1)
LYMPHOCYTES # BLD: 0.5 K/UL (ref 0.8–3.5)
LYMPHOCYTES # BLD: 0.6 K/UL (ref 0.8–3.5)
LYMPHOCYTES # BLD: 0.7 K/UL (ref 0.9–3.6)
LYMPHOCYTES # BLD: 0.7 K/UL (ref 0.9–3.6)
LYMPHOCYTES # BLD: 0.9 K/UL (ref 0.8–3.5)
LYMPHOCYTES # BLD: 0.9 K/UL (ref 0.8–3.5)
LYMPHOCYTES # BLD: 0.9 K/UL (ref 0.9–3.6)
LYMPHOCYTES NFR BLD: 11 % (ref 21–52)
LYMPHOCYTES NFR BLD: 12 % (ref 20–51)
LYMPHOCYTES NFR BLD: 13 % (ref 21–52)
LYMPHOCYTES NFR BLD: 14 % (ref 20–51)
LYMPHOCYTES NFR BLD: 15 % (ref 20–51)
LYMPHOCYTES NFR BLD: 4 % (ref 20–51)
LYMPHOCYTES NFR BLD: 6 % (ref 21–52)
MAGNESIUM SERPL-MCNC: 2.1 MG/DL (ref 1.6–2.6)
MCH RBC QN AUTO: 29.1 PG (ref 24–34)
MCH RBC QN AUTO: 29.4 PG (ref 24–34)
MCH RBC QN AUTO: 29.8 PG (ref 24–34)
MCH RBC QN AUTO: 30 PG (ref 24–34)
MCH RBC QN AUTO: 30 PG (ref 24–34)
MCH RBC QN AUTO: 30.2 PG (ref 24–34)
MCHC RBC AUTO-ENTMCNC: 31.3 G/DL (ref 31–37)
MCHC RBC AUTO-ENTMCNC: 31.8 G/DL (ref 31–37)
MCHC RBC AUTO-ENTMCNC: 32.3 G/DL (ref 31–37)
MCHC RBC AUTO-ENTMCNC: 32.6 G/DL (ref 31–37)
MCHC RBC AUTO-ENTMCNC: 32.8 G/DL (ref 31–37)
MCHC RBC AUTO-ENTMCNC: 33.3 G/DL (ref 31–37)
MCHC RBC AUTO-ENTMCNC: 33.7 G/DL (ref 31–37)
MCHC RBC AUTO-ENTMCNC: 33.8 G/DL (ref 31–37)
MCHC RBC AUTO-ENTMCNC: 34.1 G/DL (ref 31–37)
MCV RBC AUTO: 88 FL (ref 74–97)
MCV RBC AUTO: 88.6 FL (ref 74–97)
MCV RBC AUTO: 88.9 FL (ref 74–97)
MCV RBC AUTO: 89.3 FL (ref 74–97)
MCV RBC AUTO: 89.7 FL (ref 74–97)
MCV RBC AUTO: 91.3 FL (ref 74–97)
MCV RBC AUTO: 91.7 FL (ref 74–97)
MCV RBC AUTO: 92.2 FL (ref 74–97)
MCV RBC AUTO: 96.1 FL (ref 74–97)
MONOCYTES # BLD: 0.1 K/UL (ref 0–1)
MONOCYTES # BLD: 0.1 K/UL (ref 0–1)
MONOCYTES # BLD: 0.2 K/UL (ref 0–1)
MONOCYTES # BLD: 0.3 K/UL (ref 0.05–1.2)
MONOCYTES # BLD: 0.3 K/UL (ref 0–1)
MONOCYTES # BLD: 0.5 K/UL (ref 0.05–1.2)
MONOCYTES # BLD: 0.8 K/UL (ref 0.05–1.2)
MONOCYTES NFR BLD: 1 % (ref 2–9)
MONOCYTES NFR BLD: 2 % (ref 2–9)
MONOCYTES NFR BLD: 2 % (ref 2–9)
MONOCYTES NFR BLD: 4 % (ref 2–9)
MONOCYTES NFR BLD: 6 % (ref 3–10)
MONOCYTES NFR BLD: 7 % (ref 3–10)
MONOCYTES NFR BLD: 8 % (ref 3–10)
NEUTS BAND NFR BLD MANUAL: 2 % (ref 0–5)
NEUTS BAND NFR BLD MANUAL: 3 % (ref 0–5)
NEUTS SEG # BLD: 10.5 K/UL (ref 1.8–8)
NEUTS SEG # BLD: 12 K/UL (ref 1.8–8)
NEUTS SEG # BLD: 4.3 K/UL (ref 1.8–8)
NEUTS SEG # BLD: 4.8 K/UL (ref 1.8–8)
NEUTS SEG # BLD: 5.1 K/UL (ref 1.8–8)
NEUTS SEG # BLD: 5.3 K/UL (ref 1.8–8)
NEUTS SEG # BLD: 5.4 K/UL (ref 1.8–8)
NEUTS SEG NFR BLD: 78 % (ref 40–73)
NEUTS SEG NFR BLD: 81 % (ref 42–75)
NEUTS SEG NFR BLD: 81 % (ref 42–75)
NEUTS SEG NFR BLD: 82 % (ref 42–75)
NEUTS SEG NFR BLD: 83 % (ref 40–73)
NEUTS SEG NFR BLD: 87 % (ref 40–73)
NEUTS SEG NFR BLD: 92 % (ref 42–75)
NITRITE UR QL STRIP.AUTO: NEGATIVE
OTHER CELLS NFR BLD MANUAL: 2 %
OTHER CELLS NFR BLD MANUAL: 2 %
P-R INTERVAL, ECG05: 174 MS
PH UR STRIP: 5 [PH] (ref 5–8)
PLATELET # BLD AUTO: 113 K/UL (ref 135–420)
PLATELET # BLD AUTO: 180 K/UL (ref 135–420)
PLATELET # BLD AUTO: 195 K/UL (ref 135–420)
PLATELET # BLD AUTO: 244 K/UL (ref 135–420)
PLATELET # BLD AUTO: 280 K/UL (ref 135–420)
PLATELET # BLD AUTO: 297 K/UL (ref 135–420)
PLATELET # BLD AUTO: 324 K/UL (ref 135–420)
PLATELET # BLD AUTO: 337 K/UL (ref 135–420)
PLATELET # BLD AUTO: 364 K/UL (ref 135–420)
PLATELET COMMENTS,PCOM: ABNORMAL
PMV BLD AUTO: 10.9 FL (ref 9.2–11.8)
PMV BLD AUTO: 11.3 FL (ref 9.2–11.8)
PMV BLD AUTO: 11.8 FL (ref 9.2–11.8)
PMV BLD AUTO: 11.8 FL (ref 9.2–11.8)
PMV BLD AUTO: 11.9 FL (ref 9.2–11.8)
PMV BLD AUTO: 11.9 FL (ref 9.2–11.8)
PMV BLD AUTO: 12.3 FL (ref 9.2–11.8)
PMV BLD AUTO: 12.3 FL (ref 9.2–11.8)
PMV BLD AUTO: 12.4 FL (ref 9.2–11.8)
POTASSIUM SERPL-SCNC: 3.3 MMOL/L (ref 3.5–5.5)
POTASSIUM SERPL-SCNC: 3.4 MMOL/L (ref 3.5–5.5)
POTASSIUM SERPL-SCNC: 3.6 MMOL/L (ref 3.5–5.5)
POTASSIUM SERPL-SCNC: 3.7 MMOL/L (ref 3.5–5.5)
POTASSIUM SERPL-SCNC: 3.8 MMOL/L (ref 3.5–5.5)
POTASSIUM SERPL-SCNC: 3.8 MMOL/L (ref 3.5–5.5)
POTASSIUM SERPL-SCNC: 3.9 MMOL/L (ref 3.5–5.5)
POTASSIUM SERPL-SCNC: 4 MMOL/L (ref 3.5–5.5)
POTASSIUM SERPL-SCNC: 4.1 MMOL/L (ref 3.5–5.5)
POTASSIUM SERPL-SCNC: 4.1 MMOL/L (ref 3.5–5.5)
POTASSIUM SERPL-SCNC: 4.2 MMOL/L (ref 3.5–5.5)
POTASSIUM SERPL-SCNC: 4.4 MMOL/L (ref 3.5–5.5)
PROCALCITONIN SERPL-MCNC: 0.16 NG/ML
PROCALCITONIN SERPL-MCNC: 0.17 NG/ML
PROCALCITONIN SERPL-MCNC: 0.21 NG/ML
PROCALCITONIN SERPL-MCNC: 0.22 NG/ML
PROCALCITONIN SERPL-MCNC: 0.25 NG/ML
PROCALCITONIN SERPL-MCNC: 0.28 NG/ML
PROCALCITONIN SERPL-MCNC: <0.05 NG/ML
PROT SERPL-MCNC: 6.5 G/DL (ref 6.4–8.2)
PROT SERPL-MCNC: 6.7 G/DL (ref 6.4–8.2)
PROT SERPL-MCNC: 7 G/DL (ref 6.4–8.2)
PROT SERPL-MCNC: 7.3 G/DL (ref 6.4–8.2)
PROT SERPL-MCNC: 7.6 G/DL (ref 6.4–8.2)
PROT UR STRIP-MCNC: 300 MG/DL
PROTHROMBIN TIME: 12.7 SEC (ref 11.5–15.2)
PROTHROMBIN TIME: 16.8 SEC (ref 11.5–15.2)
Q-T INTERVAL, ECG07: 550 MS
QRS DURATION, ECG06: 78 MS
QTC CALCULATION (BEZET), ECG08: 516 MS
RBC # BLD AUTO: 2.03 M/UL (ref 4.2–5.3)
RBC # BLD AUTO: 3.83 M/UL (ref 4.2–5.3)
RBC # BLD AUTO: 3.98 M/UL (ref 4.2–5.3)
RBC # BLD AUTO: 4.2 M/UL (ref 4.2–5.3)
RBC # BLD AUTO: 4.3 M/UL (ref 4.2–5.3)
RBC # BLD AUTO: 4.34 M/UL (ref 4.2–5.3)
RBC # BLD AUTO: 4.36 M/UL (ref 4.2–5.3)
RBC # BLD AUTO: 4.43 M/UL (ref 4.2–5.3)
RBC # BLD AUTO: 4.73 M/UL (ref 4.2–5.3)
RBC #/AREA URNS HPF: ABNORMAL /HPF (ref 0–5)
RBC MORPH BLD: ABNORMAL
SARS-COV-2, COV2NT: ABNORMAL
SARS-COV-2, COV2NT: DETECTED
SARS-COV-2, COV2NT: NOT DETECTED
SODIUM SERPL-SCNC: 132 MMOL/L (ref 136–145)
SODIUM SERPL-SCNC: 133 MMOL/L (ref 136–145)
SODIUM SERPL-SCNC: 139 MMOL/L (ref 136–145)
SODIUM SERPL-SCNC: 142 MMOL/L (ref 136–145)
SODIUM SERPL-SCNC: 144 MMOL/L (ref 136–145)
SODIUM SERPL-SCNC: 144 MMOL/L (ref 136–145)
SODIUM SERPL-SCNC: 145 MMOL/L (ref 136–145)
SODIUM SERPL-SCNC: 148 MMOL/L (ref 136–145)
SODIUM SERPL-SCNC: 149 MMOL/L (ref 136–145)
SODIUM SERPL-SCNC: 151 MMOL/L (ref 136–145)
SODIUM SERPL-SCNC: 151 MMOL/L (ref 136–145)
SODIUM SERPL-SCNC: 153 MMOL/L (ref 136–145)
SODIUM SERPL-SCNC: 154 MMOL/L (ref 136–145)
SODIUM SERPL-SCNC: 154 MMOL/L (ref 136–145)
SODIUM SERPL-SCNC: 157 MMOL/L (ref 136–145)
SODIUM SERPL-SCNC: 158 MMOL/L (ref 136–145)
SOURCE, COVRS: ABNORMAL
SOURCE, COVRS: ABNORMAL
SOURCE, COVRS: NORMAL
SP GR UR REFRACTOMETRY: 1.02 (ref 1–1.03)
SPECIMEN TYPE, XMCV1T: NORMAL
TRIGL SERPL-MCNC: 151 MG/DL (ref ?–150)
TROPONIN I SERPL-MCNC: 0.2 NG/ML (ref 0–0.04)
TROPONIN I SERPL-MCNC: 0.53 NG/ML (ref 0–0.04)
TROPONIN I SERPL-MCNC: 0.75 NG/ML (ref 0–0.04)
TROPONIN I SERPL-MCNC: 0.87 NG/ML (ref 0–0.04)
UROBILINOGEN UR QL STRIP.AUTO: 1 EU/DL (ref 0.2–1)
VENTRICULAR RATE, ECG03: 53 BPM
VLDLC SERPL CALC-MCNC: 30.2 MG/DL
WBC # BLD AUTO: 11.9 K/UL (ref 4.6–13.2)
WBC # BLD AUTO: 12.5 K/UL (ref 4.6–13.2)
WBC # BLD AUTO: 12.8 K/UL (ref 4.6–13.2)
WBC # BLD AUTO: 5.3 K/UL (ref 4.6–13.2)
WBC # BLD AUTO: 5.8 K/UL (ref 4.6–13.2)
WBC # BLD AUTO: 6.3 K/UL (ref 4.6–13.2)
WBC # BLD AUTO: 6.4 K/UL (ref 4.6–13.2)
WBC # BLD AUTO: 6.8 K/UL (ref 4.6–13.2)
WBC # BLD AUTO: 8.3 K/UL (ref 4.6–13.2)
WBC MORPH BLD: ABNORMAL
WBC URNS QL MICRO: ABNORMAL /HPF (ref 0–4)

## 2020-01-01 PROCEDURE — 85730 THROMBOPLASTIN TIME PARTIAL: CPT

## 2020-01-01 PROCEDURE — 85379 FIBRIN DEGRADATION QUANT: CPT

## 2020-01-01 PROCEDURE — 74011250637 HC RX REV CODE- 250/637: Performed by: INTERNAL MEDICINE

## 2020-01-01 PROCEDURE — 65270000029 HC RM PRIVATE

## 2020-01-01 PROCEDURE — 85025 COMPLETE CBC W/AUTO DIFF WBC: CPT

## 2020-01-01 PROCEDURE — 92526 ORAL FUNCTION THERAPY: CPT

## 2020-01-01 PROCEDURE — 65660000000 HC RM CCU STEPDOWN

## 2020-01-01 PROCEDURE — 36415 COLL VENOUS BLD VENIPUNCTURE: CPT

## 2020-01-01 PROCEDURE — 74011250636 HC RX REV CODE- 250/636: Performed by: FAMILY MEDICINE

## 2020-01-01 PROCEDURE — 80048 BASIC METABOLIC PNL TOTAL CA: CPT

## 2020-01-01 PROCEDURE — 74011636637 HC RX REV CODE- 636/637: Performed by: INTERNAL MEDICINE

## 2020-01-01 PROCEDURE — 85610 PROTHROMBIN TIME: CPT

## 2020-01-01 PROCEDURE — 97535 SELF CARE MNGMENT TRAINING: CPT

## 2020-01-01 PROCEDURE — 74011250636 HC RX REV CODE- 250/636: Performed by: EMERGENCY MEDICINE

## 2020-01-01 PROCEDURE — 84145 PROCALCITONIN (PCT): CPT

## 2020-01-01 PROCEDURE — 74011250637 HC RX REV CODE- 250/637: Performed by: NURSE PRACTITIONER

## 2020-01-01 PROCEDURE — 87635 SARS-COV-2 COVID-19 AMP PRB: CPT

## 2020-01-01 PROCEDURE — 74011250636 HC RX REV CODE- 250/636: Performed by: INTERNAL MEDICINE

## 2020-01-01 PROCEDURE — 86140 C-REACTIVE PROTEIN: CPT

## 2020-01-01 PROCEDURE — 82962 GLUCOSE BLOOD TEST: CPT

## 2020-01-01 PROCEDURE — 77010033678 HC OXYGEN DAILY

## 2020-01-01 PROCEDURE — 82728 ASSAY OF FERRITIN: CPT

## 2020-01-01 PROCEDURE — 80053 COMPREHEN METABOLIC PANEL: CPT

## 2020-01-01 PROCEDURE — 74011000258 HC RX REV CODE- 258: Performed by: NURSE PRACTITIONER

## 2020-01-01 PROCEDURE — 74011636637 HC RX REV CODE- 636/637: Performed by: FAMILY MEDICINE

## 2020-01-01 PROCEDURE — 74011000258 HC RX REV CODE- 258: Performed by: INTERNAL MEDICINE

## 2020-01-01 PROCEDURE — 77030040831 HC BAG URINE DRNG MDII -A

## 2020-01-01 PROCEDURE — 83880 ASSAY OF NATRIURETIC PEPTIDE: CPT

## 2020-01-01 PROCEDURE — 77010033711 HC HIGH FLOW OXYGEN

## 2020-01-01 PROCEDURE — 77030038269 HC DRN EXT URIN PURWCK BARD -A

## 2020-01-01 PROCEDURE — 96366 THER/PROPH/DIAG IV INF ADDON: CPT

## 2020-01-01 PROCEDURE — 74011250637 HC RX REV CODE- 250/637: Performed by: EMERGENCY MEDICINE

## 2020-01-01 PROCEDURE — 94760 N-INVAS EAR/PLS OXIMETRY 1: CPT

## 2020-01-01 PROCEDURE — 97530 THERAPEUTIC ACTIVITIES: CPT

## 2020-01-01 PROCEDURE — 94762 N-INVAS EAR/PLS OXIMTRY CONT: CPT

## 2020-01-01 PROCEDURE — 97162 PT EVAL MOD COMPLEX 30 MIN: CPT

## 2020-01-01 PROCEDURE — 70450 CT HEAD/BRAIN W/O DYE: CPT

## 2020-01-01 PROCEDURE — 82550 ASSAY OF CK (CPK): CPT

## 2020-01-01 PROCEDURE — 74011000250 HC RX REV CODE- 250: Performed by: INTERNAL MEDICINE

## 2020-01-01 PROCEDURE — 71045 X-RAY EXAM CHEST 1 VIEW: CPT

## 2020-01-01 PROCEDURE — 85027 COMPLETE CBC AUTOMATED: CPT

## 2020-01-01 PROCEDURE — 77030008771 HC TU NG SALEM SUMP -A

## 2020-01-01 PROCEDURE — 83615 LACTATE (LD) (LDH) ENZYME: CPT

## 2020-01-01 PROCEDURE — 96365 THER/PROPH/DIAG IV INF INIT: CPT

## 2020-01-01 PROCEDURE — 99285 EMERGENCY DEPT VISIT HI MDM: CPT

## 2020-01-01 PROCEDURE — 84484 ASSAY OF TROPONIN QUANT: CPT

## 2020-01-01 PROCEDURE — 74011250636 HC RX REV CODE- 250/636

## 2020-01-01 PROCEDURE — 81001 URINALYSIS AUTO W/SCOPE: CPT

## 2020-01-01 PROCEDURE — 80061 LIPID PANEL: CPT

## 2020-01-01 PROCEDURE — 99232 SBSQ HOSP IP/OBS MODERATE 35: CPT | Performed by: NURSE PRACTITIONER

## 2020-01-01 PROCEDURE — 97166 OT EVAL MOD COMPLEX 45 MIN: CPT

## 2020-01-01 PROCEDURE — 99221 1ST HOSP IP/OBS SF/LOW 40: CPT | Performed by: NURSE PRACTITIONER

## 2020-01-01 PROCEDURE — 93005 ELECTROCARDIOGRAM TRACING: CPT

## 2020-01-01 PROCEDURE — 83690 ASSAY OF LIPASE: CPT

## 2020-01-01 PROCEDURE — 83036 HEMOGLOBIN GLYCOSYLATED A1C: CPT

## 2020-01-01 PROCEDURE — 93308 TTE F-UP OR LMTD: CPT

## 2020-01-01 PROCEDURE — 74011250637 HC RX REV CODE- 250/637: Performed by: FAMILY MEDICINE

## 2020-01-01 PROCEDURE — 99231 SBSQ HOSP IP/OBS SF/LOW 25: CPT | Performed by: NURSE PRACTITIONER

## 2020-01-01 PROCEDURE — 75810000275 HC EMERGENCY DEPT VISIT NO LEVEL OF CARE

## 2020-01-01 PROCEDURE — 97164 PT RE-EVAL EST PLAN CARE: CPT

## 2020-01-01 PROCEDURE — 92610 EVALUATE SWALLOWING FUNCTION: CPT

## 2020-01-01 PROCEDURE — 83735 ASSAY OF MAGNESIUM: CPT

## 2020-01-01 PROCEDURE — 82272 OCCULT BLD FECES 1-3 TESTS: CPT

## 2020-01-01 PROCEDURE — 85520 HEPARIN ASSAY: CPT

## 2020-01-01 PROCEDURE — 74176 CT ABD & PELVIS W/O CONTRAST: CPT

## 2020-01-01 RX ORDER — ENOXAPARIN SODIUM 100 MG/ML
30 INJECTION SUBCUTANEOUS EVERY 24 HOURS
Status: DISCONTINUED | OUTPATIENT
Start: 2020-01-01 | End: 2020-01-01

## 2020-01-01 RX ORDER — HEPARIN SODIUM 1000 [USP'U]/ML
INJECTION, SOLUTION INTRAVENOUS; SUBCUTANEOUS
Status: COMPLETED
Start: 2020-01-01 | End: 2020-01-01

## 2020-01-01 RX ORDER — HYDRALAZINE HYDROCHLORIDE 20 MG/ML
10 INJECTION INTRAMUSCULAR; INTRAVENOUS
Status: DISCONTINUED | OUTPATIENT
Start: 2020-01-01 | End: 2020-01-01

## 2020-01-01 RX ORDER — HYDRALAZINE HYDROCHLORIDE 25 MG/1
25 TABLET, FILM COATED ORAL 3 TIMES DAILY
Status: DISCONTINUED | OUTPATIENT
Start: 2020-01-01 | End: 2020-01-01

## 2020-01-01 RX ORDER — ATORVASTATIN CALCIUM 40 MG/1
40 TABLET, FILM COATED ORAL DAILY
Status: DISCONTINUED | OUTPATIENT
Start: 2020-01-01 | End: 2020-01-01

## 2020-01-01 RX ORDER — HEPARIN SODIUM 5000 [USP'U]/ML
5000 INJECTION, SOLUTION INTRAVENOUS; SUBCUTANEOUS 3 TIMES DAILY
Status: DISCONTINUED | OUTPATIENT
Start: 2020-01-01 | End: 2020-01-01

## 2020-01-01 RX ORDER — ACETAMINOPHEN 650 MG/1
650 SUPPOSITORY RECTAL
Status: DISCONTINUED | OUTPATIENT
Start: 2020-01-01 | End: 2020-01-01 | Stop reason: HOSPADM

## 2020-01-01 RX ORDER — SCOLOPAMINE TRANSDERMAL SYSTEM 1 MG/1
1 PATCH, EXTENDED RELEASE TRANSDERMAL
Status: DISCONTINUED | OUTPATIENT
Start: 2020-01-01 | End: 2020-01-01

## 2020-01-01 RX ORDER — DEXTROSE 50 % IN WATER (D50W) INTRAVENOUS SYRINGE
25-50 AS NEEDED
Status: DISCONTINUED | OUTPATIENT
Start: 2020-01-01 | End: 2020-01-01

## 2020-01-01 RX ORDER — HEPARIN SODIUM 1000 [USP'U]/ML
4000 INJECTION, SOLUTION INTRAVENOUS; SUBCUTANEOUS ONCE
Status: COMPLETED | OUTPATIENT
Start: 2020-01-01 | End: 2020-01-01

## 2020-01-01 RX ORDER — FACIAL-BODY WIPES
10 EACH TOPICAL
Qty: 10 EACH | Refills: 1 | Status: SHIPPED | OUTPATIENT
Start: 2020-01-01

## 2020-01-01 RX ORDER — LORAZEPAM 2 MG/ML
1 INJECTION INTRAMUSCULAR
Status: DISCONTINUED | OUTPATIENT
Start: 2020-01-01 | End: 2020-01-01

## 2020-01-01 RX ORDER — SODIUM CHLORIDE 0.9 % (FLUSH) 0.9 %
5-40 SYRINGE (ML) INJECTION EVERY 8 HOURS
Status: DISCONTINUED | OUTPATIENT
Start: 2020-01-01 | End: 2020-01-01

## 2020-01-01 RX ORDER — AMLODIPINE BESYLATE 5 MG/1
5 TABLET ORAL ONCE
Status: COMPLETED | OUTPATIENT
Start: 2020-01-01 | End: 2020-01-01

## 2020-01-01 RX ORDER — CLONIDINE 0.1 MG/24H
1 PATCH, EXTENDED RELEASE TRANSDERMAL
Status: DISCONTINUED | OUTPATIENT
Start: 2020-01-01 | End: 2020-01-01

## 2020-01-01 RX ORDER — LORAZEPAM 2 MG/ML
0.5 CONCENTRATE ORAL 3 TIMES DAILY
Status: DISCONTINUED | OUTPATIENT
Start: 2020-01-01 | End: 2020-01-01 | Stop reason: HOSPADM

## 2020-01-01 RX ORDER — DOXAZOSIN 8 MG/1
8 TABLET ORAL
COMMUNITY
End: 2020-01-01

## 2020-01-01 RX ORDER — LORAZEPAM 2 MG/ML
0.5 CONCENTRATE ORAL
Status: DISCONTINUED | OUTPATIENT
Start: 2020-01-01 | End: 2020-01-01 | Stop reason: HOSPADM

## 2020-01-01 RX ORDER — DEXTROSE MONOHYDRATE AND SODIUM CHLORIDE 5; .45 G/100ML; G/100ML
75 INJECTION, SOLUTION INTRAVENOUS CONTINUOUS
Status: DISCONTINUED | OUTPATIENT
Start: 2020-01-01 | End: 2020-01-01

## 2020-01-01 RX ORDER — INSULIN LISPRO 100 [IU]/ML
INJECTION, SOLUTION INTRAVENOUS; SUBCUTANEOUS
Status: DISCONTINUED | OUTPATIENT
Start: 2020-01-01 | End: 2020-01-01

## 2020-01-01 RX ORDER — FACIAL-BODY WIPES
10 EACH TOPICAL DAILY PRN
Status: DISCONTINUED | OUTPATIENT
Start: 2020-01-01 | End: 2020-01-01 | Stop reason: HOSPADM

## 2020-01-01 RX ORDER — DOXAZOSIN 4 MG/1
8 TABLET ORAL 2 TIMES DAILY
Status: DISCONTINUED | OUTPATIENT
Start: 2020-01-01 | End: 2020-01-01

## 2020-01-01 RX ORDER — AMLODIPINE BESYLATE 2.5 MG/1
2.5 TABLET ORAL DAILY
Status: DISCONTINUED | OUTPATIENT
Start: 2020-01-01 | End: 2020-01-01

## 2020-01-01 RX ORDER — HEPARIN SODIUM 10000 [USP'U]/100ML
18-36 INJECTION, SOLUTION INTRAVENOUS
Status: DISCONTINUED | OUTPATIENT
Start: 2020-01-01 | End: 2020-01-01

## 2020-01-01 RX ORDER — ONDANSETRON 2 MG/ML
4 INJECTION INTRAMUSCULAR; INTRAVENOUS
Status: DISCONTINUED | OUTPATIENT
Start: 2020-01-01 | End: 2020-01-01 | Stop reason: HOSPADM

## 2020-01-01 RX ORDER — LANOLIN ALCOHOL/MO/W.PET/CERES
3 CREAM (GRAM) TOPICAL
Status: DISCONTINUED | OUTPATIENT
Start: 2020-01-01 | End: 2020-01-01

## 2020-01-01 RX ORDER — LORAZEPAM 2 MG/ML
0.5 CONCENTRATE ORAL
Qty: 5 ML | Refills: 0 | Status: SHIPPED | OUTPATIENT
Start: 2020-01-01

## 2020-01-01 RX ORDER — OXYCODONE AND ACETAMINOPHEN 5; 325 MG/1; MG/1
2 TABLET ORAL
Status: DISCONTINUED | OUTPATIENT
Start: 2020-01-01 | End: 2020-01-01

## 2020-01-01 RX ORDER — ENOXAPARIN SODIUM 100 MG/ML
70 INJECTION SUBCUTANEOUS EVERY 24 HOURS
Status: DISCONTINUED | OUTPATIENT
Start: 2020-01-01 | End: 2020-01-01

## 2020-01-01 RX ORDER — AMLODIPINE BESYLATE 5 MG/1
5 TABLET ORAL DAILY
Status: DISCONTINUED | OUTPATIENT
Start: 2020-01-01 | End: 2020-01-01

## 2020-01-01 RX ORDER — ENOXAPARIN SODIUM 100 MG/ML
30 INJECTION SUBCUTANEOUS EVERY 12 HOURS
Status: DISCONTINUED | OUTPATIENT
Start: 2020-01-01 | End: 2020-01-01

## 2020-01-01 RX ORDER — HEPARIN SODIUM 1000 [USP'U]/ML
3000 INJECTION, SOLUTION INTRAVENOUS; SUBCUTANEOUS ONCE
Status: COMPLETED | OUTPATIENT
Start: 2020-01-01 | End: 2020-01-01

## 2020-01-01 RX ORDER — SODIUM CHLORIDE 0.9 % (FLUSH) 0.9 %
5-40 SYRINGE (ML) INJECTION AS NEEDED
Status: DISCONTINUED | OUTPATIENT
Start: 2020-01-01 | End: 2020-01-01

## 2020-01-01 RX ORDER — SODIUM CHLORIDE 9 MG/ML
75 INJECTION, SOLUTION INTRAVENOUS CONTINUOUS
Status: DISCONTINUED | OUTPATIENT
Start: 2020-01-01 | End: 2020-01-01

## 2020-01-01 RX ORDER — ACETAMINOPHEN 325 MG/1
650 TABLET ORAL
Status: COMPLETED | OUTPATIENT
Start: 2020-01-01 | End: 2020-01-01

## 2020-01-01 RX ORDER — ATENOLOL 25 MG/1
50 TABLET ORAL 2 TIMES DAILY
Status: DISCONTINUED | OUTPATIENT
Start: 2020-01-01 | End: 2020-01-01

## 2020-01-01 RX ORDER — ACETAMINOPHEN 325 MG/1
650 TABLET ORAL
Status: DISCONTINUED | OUTPATIENT
Start: 2020-01-01 | End: 2020-01-01

## 2020-01-01 RX ORDER — ZINC SULFATE 50(220)MG
1 CAPSULE ORAL DAILY
Status: DISCONTINUED | OUTPATIENT
Start: 2020-01-01 | End: 2020-01-01

## 2020-01-01 RX ORDER — GUAIFENESIN 100 MG/5ML
81 LIQUID (ML) ORAL DAILY
Status: DISCONTINUED | OUTPATIENT
Start: 2020-01-01 | End: 2020-01-01

## 2020-01-01 RX ORDER — PANTOPRAZOLE SODIUM 40 MG/1
40 TABLET, DELAYED RELEASE ORAL
Status: DISCONTINUED | OUTPATIENT
Start: 2020-01-01 | End: 2020-01-01

## 2020-01-01 RX ORDER — MORPHINE SULFATE 100 MG/5ML
2.5 SOLUTION ORAL
Status: DISCONTINUED | OUTPATIENT
Start: 2020-01-01 | End: 2020-01-01 | Stop reason: HOSPADM

## 2020-01-01 RX ORDER — DOXAZOSIN 4 MG/1
8 TABLET ORAL
Status: DISCONTINUED | OUTPATIENT
Start: 2020-01-01 | End: 2020-01-01

## 2020-01-01 RX ORDER — HALOPERIDOL 5 MG/ML
3 INJECTION INTRAMUSCULAR ONCE
Status: COMPLETED | OUTPATIENT
Start: 2020-01-01 | End: 2020-01-01

## 2020-01-01 RX ORDER — HYDRALAZINE HYDROCHLORIDE 50 MG/1
50 TABLET, FILM COATED ORAL 3 TIMES DAILY
Status: DISCONTINUED | OUTPATIENT
Start: 2020-01-01 | End: 2020-01-01

## 2020-01-01 RX ORDER — INSULIN GLARGINE 100 [IU]/ML
10 INJECTION, SOLUTION SUBCUTANEOUS
Status: DISCONTINUED | OUTPATIENT
Start: 2020-01-01 | End: 2020-01-01

## 2020-01-01 RX ORDER — HEPARIN SODIUM 1000 [USP'U]/ML
80 INJECTION, SOLUTION INTRAVENOUS; SUBCUTANEOUS ONCE
Status: COMPLETED | OUTPATIENT
Start: 2020-01-01 | End: 2020-01-01

## 2020-01-01 RX ORDER — CHOLECALCIFEROL (VITAMIN D3) 125 MCG
5000 CAPSULE ORAL DAILY
Status: DISCONTINUED | OUTPATIENT
Start: 2020-01-01 | End: 2020-01-01

## 2020-01-01 RX ORDER — MAGNESIUM SULFATE 100 %
4 CRYSTALS MISCELLANEOUS AS NEEDED
Status: DISCONTINUED | OUTPATIENT
Start: 2020-01-01 | End: 2020-01-01

## 2020-01-01 RX ORDER — SODIUM CHLORIDE 9 MG/ML
500 INJECTION, SOLUTION INTRAVENOUS ONCE
Status: COMPLETED | OUTPATIENT
Start: 2020-01-01 | End: 2020-01-01

## 2020-01-01 RX ORDER — LORAZEPAM 2 MG/ML
0.5 INJECTION INTRAMUSCULAR
Status: DISCONTINUED | OUTPATIENT
Start: 2020-01-01 | End: 2020-01-01

## 2020-01-01 RX ORDER — HYDRALAZINE HYDROCHLORIDE 20 MG/ML
20 INJECTION INTRAMUSCULAR; INTRAVENOUS
Status: DISCONTINUED | OUTPATIENT
Start: 2020-01-01 | End: 2020-01-01

## 2020-01-01 RX ORDER — LORAZEPAM 2 MG/ML
0.5 INJECTION INTRAMUSCULAR ONCE
Status: COMPLETED | OUTPATIENT
Start: 2020-01-01 | End: 2020-01-01

## 2020-01-01 RX ORDER — HEPARIN SODIUM 10000 [USP'U]/100ML
12-25 INJECTION, SOLUTION INTRAVENOUS
Status: DISCONTINUED | OUTPATIENT
Start: 2020-01-01 | End: 2020-01-01

## 2020-01-01 RX ORDER — PREDNISONE 20 MG/1
40 TABLET ORAL
Status: DISCONTINUED | OUTPATIENT
Start: 2020-01-01 | End: 2020-01-01

## 2020-01-01 RX ORDER — ATENOLOL 25 MG/1
25 TABLET ORAL 2 TIMES DAILY
Status: DISCONTINUED | OUTPATIENT
Start: 2020-01-01 | End: 2020-01-01

## 2020-01-01 RX ORDER — ATENOLOL 25 MG/1
100 TABLET ORAL 2 TIMES DAILY
Status: DISCONTINUED | OUTPATIENT
Start: 2020-01-01 | End: 2020-01-01

## 2020-01-01 RX ORDER — MORPHINE SULFATE 100 MG/5ML
2.5 SOLUTION ORAL
Qty: 2 ML | Refills: 0 | Status: SHIPPED | OUTPATIENT
Start: 2020-01-01 | End: 2020-08-21

## 2020-01-01 RX ORDER — MORPHINE SULFATE 2 MG/ML
1 INJECTION, SOLUTION INTRAMUSCULAR; INTRAVENOUS ONCE
Status: COMPLETED | OUTPATIENT
Start: 2020-01-01 | End: 2020-01-01

## 2020-01-01 RX ORDER — DEXTROSE MONOHYDRATE 50 MG/ML
75 INJECTION, SOLUTION INTRAVENOUS CONTINUOUS
Status: DISCONTINUED | OUTPATIENT
Start: 2020-01-01 | End: 2020-01-01

## 2020-01-01 RX ORDER — DEXTROSE MONOHYDRATE AND SODIUM CHLORIDE 5; .45 G/100ML; G/100ML
50 INJECTION, SOLUTION INTRAVENOUS CONTINUOUS
Status: DISCONTINUED | OUTPATIENT
Start: 2020-01-01 | End: 2020-01-01

## 2020-01-01 RX ORDER — VITAMIN E 1000 UNIT
1000 CAPSULE ORAL DAILY
Status: DISCONTINUED | OUTPATIENT
Start: 2020-01-01 | End: 2020-01-01

## 2020-01-01 RX ORDER — AMLODIPINE BESYLATE 10 MG/1
10 TABLET ORAL DAILY
Status: DISCONTINUED | OUTPATIENT
Start: 2020-01-01 | End: 2020-01-01

## 2020-01-01 RX ORDER — ACETAMINOPHEN 650 MG/1
650 SUPPOSITORY RECTAL
Qty: 10 SUPPOSITORY | Refills: 1 | Status: SHIPPED | OUTPATIENT
Start: 2020-01-01

## 2020-01-01 RX ORDER — GUAIFENESIN 100 MG/5ML
324 LIQUID (ML) ORAL
Status: COMPLETED | OUTPATIENT
Start: 2020-01-01 | End: 2020-01-01

## 2020-01-01 RX ADMIN — WATER 1 G: 1 INJECTION INTRAMUSCULAR; INTRAVENOUS; SUBCUTANEOUS at 21:32

## 2020-01-01 RX ADMIN — LORAZEPAM 0.5 MG: 2 SOLUTION, CONCENTRATE ORAL at 09:57

## 2020-01-01 RX ADMIN — LORAZEPAM 0.5 MG: 2 SOLUTION, CONCENTRATE ORAL at 22:50

## 2020-01-01 RX ADMIN — Medication 10 ML: at 17:19

## 2020-01-01 RX ADMIN — MELATONIN 3 MG: at 21:00

## 2020-01-01 RX ADMIN — HYDRALAZINE HYDROCHLORIDE 25 MG: 25 TABLET, FILM COATED ORAL at 08:36

## 2020-01-01 RX ADMIN — AMLODIPINE BESYLATE 5 MG: 5 TABLET ORAL at 09:32

## 2020-01-01 RX ADMIN — SODIUM CHLORIDE 75 ML/HR: 900 INJECTION, SOLUTION INTRAVENOUS at 03:10

## 2020-01-01 RX ADMIN — METHYLPREDNISOLONE SODIUM SUCCINATE 40 MG: 40 INJECTION, POWDER, FOR SOLUTION INTRAMUSCULAR; INTRAVENOUS at 08:36

## 2020-01-01 RX ADMIN — INSULIN LISPRO 3 UNITS: 100 INJECTION, SOLUTION INTRAVENOUS; SUBCUTANEOUS at 11:53

## 2020-01-01 RX ADMIN — INSULIN GLARGINE 10 UNITS: 100 INJECTION, SOLUTION SUBCUTANEOUS at 20:32

## 2020-01-01 RX ADMIN — LORAZEPAM 0.5 MG: 2 SOLUTION, CONCENTRATE ORAL at 23:27

## 2020-01-01 RX ADMIN — LORAZEPAM 0.5 MG: 2 SOLUTION, CONCENTRATE ORAL at 22:12

## 2020-01-01 RX ADMIN — LORAZEPAM 0.5 MG: 2 SOLUTION, CONCENTRATE ORAL at 09:13

## 2020-01-01 RX ADMIN — Medication 1000 MG: at 08:23

## 2020-01-01 RX ADMIN — ENOXAPARIN SODIUM 70 MG: 80 INJECTION SUBCUTANEOUS at 09:28

## 2020-01-01 RX ADMIN — LORAZEPAM 1 MG: 2 INJECTION INTRAMUSCULAR; INTRAVENOUS at 10:36

## 2020-01-01 RX ADMIN — PREDNISONE 40 MG: 20 TABLET ORAL at 10:18

## 2020-01-01 RX ADMIN — PANTOPRAZOLE SODIUM 40 MG: 40 TABLET, DELAYED RELEASE ORAL at 09:27

## 2020-01-01 RX ADMIN — LORAZEPAM 0.5 MG: 2 INJECTION INTRAMUSCULAR; INTRAVENOUS at 05:21

## 2020-01-01 RX ADMIN — Medication 1000 MG: at 09:27

## 2020-01-01 RX ADMIN — HEPARIN SODIUM 12 UNITS/KG/HR: 10000 INJECTION, SOLUTION INTRAVENOUS at 22:39

## 2020-01-01 RX ADMIN — INSULIN LISPRO 2 UNITS: 100 INJECTION, SOLUTION INTRAVENOUS; SUBCUTANEOUS at 12:07

## 2020-01-01 RX ADMIN — DOXAZOSIN 8 MG: 4 TABLET ORAL at 21:15

## 2020-01-01 RX ADMIN — DOXAZOSIN 8 MG: 4 TABLET ORAL at 09:27

## 2020-01-01 RX ADMIN — Medication 10 ML: at 15:00

## 2020-01-01 RX ADMIN — PANTOPRAZOLE SODIUM 40 MG: 40 TABLET, DELAYED RELEASE ORAL at 09:13

## 2020-01-01 RX ADMIN — LORAZEPAM 1 MG: 2 INJECTION INTRAMUSCULAR; INTRAVENOUS at 04:17

## 2020-01-01 RX ADMIN — ATORVASTATIN CALCIUM 40 MG: 40 TABLET, FILM COATED ORAL at 08:36

## 2020-01-01 RX ADMIN — ATORVASTATIN CALCIUM 40 MG: 40 TABLET, FILM COATED ORAL at 10:18

## 2020-01-01 RX ADMIN — INSULIN LISPRO 4 UNITS: 100 INJECTION, SOLUTION INTRAVENOUS; SUBCUTANEOUS at 16:30

## 2020-01-01 RX ADMIN — DOXAZOSIN 8 MG: 4 TABLET ORAL at 08:23

## 2020-01-01 RX ADMIN — MORPHINE SULFATE 2.6 MG: 100 SOLUTION ORAL at 16:23

## 2020-01-01 RX ADMIN — INSULIN GLARGINE 10 UNITS: 100 INJECTION, SOLUTION SUBCUTANEOUS at 21:29

## 2020-01-01 RX ADMIN — Medication 81 MG: at 09:09

## 2020-01-01 RX ADMIN — HYDRALAZINE HYDROCHLORIDE 25 MG: 25 TABLET, FILM COATED ORAL at 09:28

## 2020-01-01 RX ADMIN — METHYLPREDNISOLONE SODIUM SUCCINATE 40 MG: 40 INJECTION, POWDER, FOR SOLUTION INTRAMUSCULAR; INTRAVENOUS at 21:48

## 2020-01-01 RX ADMIN — PIPERACILLIN AND TAZOBACTAM 2.25 G: 2; .25 INJECTION, POWDER, FOR SOLUTION INTRAVENOUS at 03:12

## 2020-01-01 RX ADMIN — METHYLPREDNISOLONE SODIUM SUCCINATE 40 MG: 40 INJECTION, POWDER, FOR SOLUTION INTRAMUSCULAR; INTRAVENOUS at 22:00

## 2020-01-01 RX ADMIN — DOXAZOSIN 8 MG: 4 TABLET ORAL at 21:30

## 2020-01-01 RX ADMIN — Medication 5000 UNITS: at 09:39

## 2020-01-01 RX ADMIN — INSULIN LISPRO 2 UNITS: 100 INJECTION, SOLUTION INTRAVENOUS; SUBCUTANEOUS at 18:31

## 2020-01-01 RX ADMIN — HYDRALAZINE HYDROCHLORIDE 50 MG: 50 TABLET, FILM COATED ORAL at 09:32

## 2020-01-01 RX ADMIN — PIPERACILLIN AND TAZOBACTAM 2.25 G: 2; .25 INJECTION, POWDER, FOR SOLUTION INTRAVENOUS at 14:04

## 2020-01-01 RX ADMIN — LORAZEPAM 0.5 MG: 2 SOLUTION, CONCENTRATE ORAL at 22:18

## 2020-01-01 RX ADMIN — AMLODIPINE BESYLATE 5 MG: 5 TABLET ORAL at 12:12

## 2020-01-01 RX ADMIN — Medication 5000 UNITS: at 09:11

## 2020-01-01 RX ADMIN — NITROGLYCERIN 0.5 INCH: 20 OINTMENT TOPICAL at 06:30

## 2020-01-01 RX ADMIN — Medication 10 ML: at 10:08

## 2020-01-01 RX ADMIN — INSULIN LISPRO 2 UNITS: 100 INJECTION, SOLUTION INTRAVENOUS; SUBCUTANEOUS at 12:42

## 2020-01-01 RX ADMIN — HYDRALAZINE HYDROCHLORIDE 25 MG: 25 TABLET, FILM COATED ORAL at 20:32

## 2020-01-01 RX ADMIN — DOXAZOSIN 8 MG: 4 TABLET ORAL at 20:32

## 2020-01-01 RX ADMIN — HYDRALAZINE HYDROCHLORIDE 20 MG: 20 INJECTION INTRAMUSCULAR; INTRAVENOUS at 13:10

## 2020-01-01 RX ADMIN — LORAZEPAM 0.5 MG: 2 SOLUTION, CONCENTRATE ORAL at 15:37

## 2020-01-01 RX ADMIN — INSULIN LISPRO 2 UNITS: 100 INJECTION, SOLUTION INTRAVENOUS; SUBCUTANEOUS at 21:29

## 2020-01-01 RX ADMIN — HYDRALAZINE HYDROCHLORIDE 25 MG: 25 TABLET, FILM COATED ORAL at 20:27

## 2020-01-01 RX ADMIN — AZITHROMYCIN MONOHYDRATE 500 MG: 500 INJECTION, POWDER, LYOPHILIZED, FOR SOLUTION INTRAVENOUS at 23:04

## 2020-01-01 RX ADMIN — HYDRALAZINE HYDROCHLORIDE 10 MG: 20 INJECTION, SOLUTION INTRAMUSCULAR; INTRAVENOUS at 09:08

## 2020-01-01 RX ADMIN — Medication 10 ML: at 09:00

## 2020-01-01 RX ADMIN — HYDRALAZINE HYDROCHLORIDE 50 MG: 50 TABLET, FILM COATED ORAL at 22:30

## 2020-01-01 RX ADMIN — LORAZEPAM 0.5 MG: 2 INJECTION INTRAMUSCULAR; INTRAVENOUS at 14:38

## 2020-01-01 RX ADMIN — ATENOLOL 50 MG: 25 TABLET ORAL at 09:11

## 2020-01-01 RX ADMIN — LORAZEPAM 0.5 MG: 2 SOLUTION, CONCENTRATE ORAL at 18:25

## 2020-01-01 RX ADMIN — LORAZEPAM 0.5 MG: 2 SOLUTION, CONCENTRATE ORAL at 00:02

## 2020-01-01 RX ADMIN — LORAZEPAM 0.5 MG: 2 SOLUTION, CONCENTRATE ORAL at 16:57

## 2020-01-01 RX ADMIN — ATORVASTATIN CALCIUM 40 MG: 40 TABLET, FILM COATED ORAL at 09:28

## 2020-01-01 RX ADMIN — PANTOPRAZOLE SODIUM 40 MG: 40 TABLET, DELAYED RELEASE ORAL at 08:23

## 2020-01-01 RX ADMIN — DOXAZOSIN 8 MG: 4 TABLET ORAL at 17:05

## 2020-01-01 RX ADMIN — PIPERACILLIN AND TAZOBACTAM 2.25 G: 2; .25 INJECTION, POWDER, FOR SOLUTION INTRAVENOUS at 15:45

## 2020-01-01 RX ADMIN — MORPHINE SULFATE 1 MG: 2 INJECTION, SOLUTION INTRAMUSCULAR; INTRAVENOUS at 00:11

## 2020-01-01 RX ADMIN — Medication 10 ML: at 20:33

## 2020-01-01 RX ADMIN — HEPARIN SODIUM 80 UNITS: 1000 INJECTION INTRAVENOUS; SUBCUTANEOUS at 22:47

## 2020-01-01 RX ADMIN — METHYLPREDNISOLONE SODIUM SUCCINATE 40 MG: 40 INJECTION, POWDER, FOR SOLUTION INTRAMUSCULAR; INTRAVENOUS at 09:00

## 2020-01-01 RX ADMIN — INSULIN LISPRO 3 UNITS: 100 INJECTION, SOLUTION INTRAVENOUS; SUBCUTANEOUS at 11:30

## 2020-01-01 RX ADMIN — Medication 10 ML: at 14:48

## 2020-01-01 RX ADMIN — LORAZEPAM 0.5 MG: 2 SOLUTION, CONCENTRATE ORAL at 22:34

## 2020-01-01 RX ADMIN — DOXAZOSIN 8 MG: 4 TABLET ORAL at 08:37

## 2020-01-01 RX ADMIN — Medication 5000 UNITS: at 09:09

## 2020-01-01 RX ADMIN — INSULIN LISPRO 3 UNITS: 100 INJECTION, SOLUTION INTRAVENOUS; SUBCUTANEOUS at 22:21

## 2020-01-01 RX ADMIN — INSULIN GLARGINE 10 UNITS: 100 INJECTION, SOLUTION SUBCUTANEOUS at 21:59

## 2020-01-01 RX ADMIN — Medication 81 MG: at 10:05

## 2020-01-01 RX ADMIN — HYDRALAZINE HYDROCHLORIDE 25 MG: 25 TABLET, FILM COATED ORAL at 20:59

## 2020-01-01 RX ADMIN — AZITHROMYCIN MONOHYDRATE 500 MG: 500 INJECTION, POWDER, LYOPHILIZED, FOR SOLUTION INTRAVENOUS at 23:54

## 2020-01-01 RX ADMIN — ZINC SULFATE 220 MG (50 MG) CAPSULE 1 CAPSULE: CAPSULE at 09:32

## 2020-01-01 RX ADMIN — PIPERACILLIN AND TAZOBACTAM 2.25 G: 2; .25 INJECTION, POWDER, FOR SOLUTION INTRAVENOUS at 08:23

## 2020-01-01 RX ADMIN — AMLODIPINE BESYLATE 10 MG: 10 TABLET ORAL at 10:18

## 2020-01-01 RX ADMIN — ENOXAPARIN SODIUM 70 MG: 80 INJECTION SUBCUTANEOUS at 09:10

## 2020-01-01 RX ADMIN — METHYLPREDNISOLONE SODIUM SUCCINATE 40 MG: 40 INJECTION, POWDER, FOR SOLUTION INTRAMUSCULAR; INTRAVENOUS at 09:28

## 2020-01-01 RX ADMIN — INSULIN LISPRO 3 UNITS: 100 INJECTION, SOLUTION INTRAVENOUS; SUBCUTANEOUS at 09:45

## 2020-01-01 RX ADMIN — ZINC SULFATE 220 MG (50 MG) CAPSULE 1 CAPSULE: CAPSULE at 09:27

## 2020-01-01 RX ADMIN — PIPERACILLIN AND TAZOBACTAM 2.25 G: 2; .25 INJECTION, POWDER, FOR SOLUTION INTRAVENOUS at 14:02

## 2020-01-01 RX ADMIN — ENOXAPARIN SODIUM 70 MG: 80 INJECTION SUBCUTANEOUS at 08:35

## 2020-01-01 RX ADMIN — ZINC SULFATE 220 MG (50 MG) CAPSULE 1 CAPSULE: CAPSULE at 09:12

## 2020-01-01 RX ADMIN — ENOXAPARIN SODIUM 30 MG: 30 INJECTION SUBCUTANEOUS at 18:00

## 2020-01-01 RX ADMIN — LORAZEPAM 0.5 MG: 2 SOLUTION, CONCENTRATE ORAL at 21:38

## 2020-01-01 RX ADMIN — LORAZEPAM 0.5 MG: 2 SOLUTION, CONCENTRATE ORAL at 22:22

## 2020-01-01 RX ADMIN — ATENOLOL 100 MG: 25 TABLET ORAL at 21:31

## 2020-01-01 RX ADMIN — PIPERACILLIN AND TAZOBACTAM 2.25 G: 2; .25 INJECTION, POWDER, FOR SOLUTION INTRAVENOUS at 16:50

## 2020-01-01 RX ADMIN — Medication 1000 MG: at 09:08

## 2020-01-01 RX ADMIN — PIPERACILLIN AND TAZOBACTAM 2.25 G: 2; .25 INJECTION, POWDER, FOR SOLUTION INTRAVENOUS at 04:40

## 2020-01-01 RX ADMIN — HEPARIN SODIUM 15 UNITS/KG/HR: 10000 INJECTION, SOLUTION INTRAVENOUS at 18:36

## 2020-01-01 RX ADMIN — ZINC SULFATE 220 MG (50 MG) CAPSULE 1 CAPSULE: CAPSULE at 08:36

## 2020-01-01 RX ADMIN — Medication 10 ML: at 21:44

## 2020-01-01 RX ADMIN — AMLODIPINE BESYLATE 10 MG: 10 TABLET ORAL at 08:23

## 2020-01-01 RX ADMIN — LORAZEPAM 0.5 MG: 2 SOLUTION, CONCENTRATE ORAL at 22:00

## 2020-01-01 RX ADMIN — INSULIN LISPRO 3 UNITS: 100 INJECTION, SOLUTION INTRAVENOUS; SUBCUTANEOUS at 20:40

## 2020-01-01 RX ADMIN — ENOXAPARIN SODIUM 70 MG: 80 INJECTION SUBCUTANEOUS at 10:04

## 2020-01-01 RX ADMIN — PIPERACILLIN AND TAZOBACTAM 2.25 G: 2; .25 INJECTION, POWDER, FOR SOLUTION INTRAVENOUS at 09:13

## 2020-01-01 RX ADMIN — AMLODIPINE BESYLATE 10 MG: 10 TABLET ORAL at 08:18

## 2020-01-01 RX ADMIN — ENOXAPARIN SODIUM 70 MG: 80 INJECTION SUBCUTANEOUS at 12:07

## 2020-01-01 RX ADMIN — Medication 10 ML: at 21:35

## 2020-01-01 RX ADMIN — Medication 10 ML: at 21:53

## 2020-01-01 RX ADMIN — LORAZEPAM 0.5 MG: 2 SOLUTION, CONCENTRATE ORAL at 18:20

## 2020-01-01 RX ADMIN — HYDRALAZINE HYDROCHLORIDE 25 MG: 25 TABLET, FILM COATED ORAL at 10:04

## 2020-01-01 RX ADMIN — LORAZEPAM 0.5 MG: 2 SOLUTION, CONCENTRATE ORAL at 09:09

## 2020-01-01 RX ADMIN — PIPERACILLIN AND TAZOBACTAM 2.25 G: 2; .25 INJECTION, POWDER, FOR SOLUTION INTRAVENOUS at 21:44

## 2020-01-01 RX ADMIN — Medication 10 ML: at 23:00

## 2020-01-01 RX ADMIN — INSULIN LISPRO 8 UNITS: 100 INJECTION, SOLUTION INTRAVENOUS; SUBCUTANEOUS at 09:13

## 2020-01-01 RX ADMIN — LORAZEPAM 0.5 MG: 2 SOLUTION, CONCENTRATE ORAL at 09:54

## 2020-01-01 RX ADMIN — DOXAZOSIN 8 MG: 4 TABLET ORAL at 10:05

## 2020-01-01 RX ADMIN — INSULIN LISPRO 4 UNITS: 100 INJECTION, SOLUTION INTRAVENOUS; SUBCUTANEOUS at 09:00

## 2020-01-01 RX ADMIN — LORAZEPAM 0.5 MG: 2 SOLUTION, CONCENTRATE ORAL at 15:56

## 2020-01-01 RX ADMIN — LORAZEPAM 0.5 MG: 2 SOLUTION, CONCENTRATE ORAL at 17:29

## 2020-01-01 RX ADMIN — INSULIN LISPRO 2 UNITS: 100 INJECTION, SOLUTION INTRAVENOUS; SUBCUTANEOUS at 17:15

## 2020-01-01 RX ADMIN — SODIUM CHLORIDE 75 ML/HR: 900 INJECTION, SOLUTION INTRAVENOUS at 11:47

## 2020-01-01 RX ADMIN — Medication 81 MG: at 08:36

## 2020-01-01 RX ADMIN — ZINC SULFATE 220 MG (50 MG) CAPSULE 1 CAPSULE: CAPSULE at 09:13

## 2020-01-01 RX ADMIN — Medication 1000 MG: at 10:18

## 2020-01-01 RX ADMIN — METHYLPREDNISOLONE SODIUM SUCCINATE 40 MG: 40 INJECTION, POWDER, FOR SOLUTION INTRAMUSCULAR; INTRAVENOUS at 09:29

## 2020-01-01 RX ADMIN — AZITHROMYCIN MONOHYDRATE 500 MG: 500 INJECTION, POWDER, LYOPHILIZED, FOR SOLUTION INTRAVENOUS at 20:31

## 2020-01-01 RX ADMIN — METHYLPREDNISOLONE SODIUM SUCCINATE 40 MG: 40 INJECTION, POWDER, FOR SOLUTION INTRAMUSCULAR; INTRAVENOUS at 18:40

## 2020-01-01 RX ADMIN — MELATONIN 3 MG: at 01:44

## 2020-01-01 RX ADMIN — INSULIN GLARGINE 10 UNITS: 100 INJECTION, SOLUTION SUBCUTANEOUS at 20:25

## 2020-01-01 RX ADMIN — PANTOPRAZOLE SODIUM 40 MG: 40 TABLET, DELAYED RELEASE ORAL at 10:18

## 2020-01-01 RX ADMIN — INSULIN GLARGINE 10 UNITS: 100 INJECTION, SOLUTION SUBCUTANEOUS at 20:33

## 2020-01-01 RX ADMIN — METHYLPREDNISOLONE SODIUM SUCCINATE 40 MG: 40 INJECTION, POWDER, FOR SOLUTION INTRAMUSCULAR; INTRAVENOUS at 09:33

## 2020-01-01 RX ADMIN — Medication 10 ML: at 21:00

## 2020-01-01 RX ADMIN — Medication 10 ML: at 15:12

## 2020-01-01 RX ADMIN — INSULIN GLARGINE 10 UNITS: 100 INJECTION, SOLUTION SUBCUTANEOUS at 22:32

## 2020-01-01 RX ADMIN — Medication 5000 UNITS: at 08:24

## 2020-01-01 RX ADMIN — DOXAZOSIN 8 MG: 4 TABLET ORAL at 22:05

## 2020-01-01 RX ADMIN — HYDRALAZINE HYDROCHLORIDE 25 MG: 25 TABLET, FILM COATED ORAL at 16:50

## 2020-01-01 RX ADMIN — Medication 1000 MG: at 09:11

## 2020-01-01 RX ADMIN — ATORVASTATIN CALCIUM 40 MG: 40 TABLET, FILM COATED ORAL at 12:50

## 2020-01-01 RX ADMIN — LORAZEPAM 0.5 MG: 2 INJECTION INTRAMUSCULAR; INTRAVENOUS at 17:22

## 2020-01-01 RX ADMIN — HYDRALAZINE HYDROCHLORIDE 20 MG: 20 INJECTION INTRAMUSCULAR; INTRAVENOUS at 01:30

## 2020-01-01 RX ADMIN — INSULIN LISPRO 2 UNITS: 100 INJECTION, SOLUTION INTRAVENOUS; SUBCUTANEOUS at 23:22

## 2020-01-01 RX ADMIN — INSULIN LISPRO 3 UNITS: 100 INJECTION, SOLUTION INTRAVENOUS; SUBCUTANEOUS at 21:59

## 2020-01-01 RX ADMIN — LORAZEPAM 1 MG: 2 INJECTION INTRAMUSCULAR; INTRAVENOUS at 20:33

## 2020-01-01 RX ADMIN — PIPERACILLIN AND TAZOBACTAM 2.25 G: 2; .25 INJECTION, POWDER, FOR SOLUTION INTRAVENOUS at 22:01

## 2020-01-01 RX ADMIN — ATENOLOL 100 MG: 25 TABLET ORAL at 22:00

## 2020-01-01 RX ADMIN — HYDRALAZINE HYDROCHLORIDE 25 MG: 25 TABLET, FILM COATED ORAL at 22:00

## 2020-01-01 RX ADMIN — DEXTROSE MONOHYDRATE AND SODIUM CHLORIDE 75 ML/HR: 5; .45 INJECTION, SOLUTION INTRAVENOUS at 15:29

## 2020-01-01 RX ADMIN — INSULIN LISPRO 2 UNITS: 100 INJECTION, SOLUTION INTRAVENOUS; SUBCUTANEOUS at 09:31

## 2020-01-01 RX ADMIN — Medication 81 MG: at 09:08

## 2020-01-01 RX ADMIN — LORAZEPAM 0.5 MG: 2 INJECTION INTRAMUSCULAR; INTRAVENOUS at 04:50

## 2020-01-01 RX ADMIN — LORAZEPAM 0.5 MG: 2 SOLUTION, CONCENTRATE ORAL at 17:06

## 2020-01-01 RX ADMIN — INSULIN LISPRO 2 UNITS: 100 INJECTION, SOLUTION INTRAVENOUS; SUBCUTANEOUS at 17:36

## 2020-01-01 RX ADMIN — INSULIN GLARGINE 10 UNITS: 100 INJECTION, SOLUTION SUBCUTANEOUS at 22:06

## 2020-01-01 RX ADMIN — INSULIN LISPRO 3 UNITS: 100 INJECTION, SOLUTION INTRAVENOUS; SUBCUTANEOUS at 17:04

## 2020-01-01 RX ADMIN — PIPERACILLIN AND TAZOBACTAM 2.25 G: 2; .25 INJECTION, POWDER, FOR SOLUTION INTRAVENOUS at 04:16

## 2020-01-01 RX ADMIN — AMLODIPINE BESYLATE 10 MG: 10 TABLET ORAL at 10:05

## 2020-01-01 RX ADMIN — INSULIN GLARGINE 10 UNITS: 100 INJECTION, SOLUTION SUBCUTANEOUS at 21:00

## 2020-01-01 RX ADMIN — INSULIN LISPRO 3 UNITS: 100 INJECTION, SOLUTION INTRAVENOUS; SUBCUTANEOUS at 16:30

## 2020-01-01 RX ADMIN — PIPERACILLIN AND TAZOBACTAM 2.25 G: 2; .25 INJECTION, POWDER, FOR SOLUTION INTRAVENOUS at 05:00

## 2020-01-01 RX ADMIN — HEPARIN SODIUM 18 UNITS/KG/HR: 10000 INJECTION, SOLUTION INTRAVENOUS at 22:48

## 2020-01-01 RX ADMIN — Medication 10 ML: at 09:28

## 2020-01-01 RX ADMIN — HYDRALAZINE HYDROCHLORIDE 25 MG: 25 TABLET, FILM COATED ORAL at 22:33

## 2020-01-01 RX ADMIN — INSULIN LISPRO 4 UNITS: 100 INJECTION, SOLUTION INTRAVENOUS; SUBCUTANEOUS at 21:00

## 2020-01-01 RX ADMIN — METHYLPREDNISOLONE SODIUM SUCCINATE 40 MG: 40 INJECTION, POWDER, FOR SOLUTION INTRAMUSCULAR; INTRAVENOUS at 09:12

## 2020-01-01 RX ADMIN — INSULIN LISPRO 3 UNITS: 100 INJECTION, SOLUTION INTRAVENOUS; SUBCUTANEOUS at 20:26

## 2020-01-01 RX ADMIN — Medication 1000 MG: at 08:37

## 2020-01-01 RX ADMIN — Medication 10 ML: at 09:13

## 2020-01-01 RX ADMIN — INSULIN GLARGINE 10 UNITS: 100 INJECTION, SOLUTION SUBCUTANEOUS at 20:40

## 2020-01-01 RX ADMIN — LORAZEPAM 0.5 MG: 2 SOLUTION, CONCENTRATE ORAL at 09:10

## 2020-01-01 RX ADMIN — INSULIN LISPRO 2 UNITS: 100 INJECTION, SOLUTION INTRAVENOUS; SUBCUTANEOUS at 21:10

## 2020-01-01 RX ADMIN — Medication 81 MG: at 09:11

## 2020-01-01 RX ADMIN — Medication 81 MG: at 10:17

## 2020-01-01 RX ADMIN — INSULIN GLARGINE 10 UNITS: 100 INJECTION, SOLUTION SUBCUTANEOUS at 23:00

## 2020-01-01 RX ADMIN — LORAZEPAM 0.5 MG: 2 SOLUTION, CONCENTRATE ORAL at 17:30

## 2020-01-01 RX ADMIN — HYDRALAZINE HYDROCHLORIDE 10 MG: 20 INJECTION, SOLUTION INTRAMUSCULAR; INTRAVENOUS at 10:08

## 2020-01-01 RX ADMIN — PIPERACILLIN AND TAZOBACTAM 2.25 G: 2; .25 INJECTION, POWDER, FOR SOLUTION INTRAVENOUS at 10:04

## 2020-01-01 RX ADMIN — Medication 10 ML: at 14:26

## 2020-01-01 RX ADMIN — Medication 10 ML: at 10:06

## 2020-01-01 RX ADMIN — INSULIN LISPRO 3 UNITS: 100 INJECTION, SOLUTION INTRAVENOUS; SUBCUTANEOUS at 22:31

## 2020-01-01 RX ADMIN — INSULIN LISPRO 6 UNITS: 100 INJECTION, SOLUTION INTRAVENOUS; SUBCUTANEOUS at 17:19

## 2020-01-01 RX ADMIN — LORAZEPAM 0.5 MG: 2 INJECTION INTRAMUSCULAR; INTRAVENOUS at 22:30

## 2020-01-01 RX ADMIN — ATENOLOL 100 MG: 25 TABLET ORAL at 09:33

## 2020-01-01 RX ADMIN — METHYLPREDNISOLONE SODIUM SUCCINATE 40 MG: 40 INJECTION, POWDER, FOR SOLUTION INTRAMUSCULAR; INTRAVENOUS at 08:18

## 2020-01-01 RX ADMIN — WATER 1 G: 1 INJECTION INTRAMUSCULAR; INTRAVENOUS; SUBCUTANEOUS at 21:50

## 2020-01-01 RX ADMIN — Medication 10 ML: at 15:30

## 2020-01-01 RX ADMIN — Medication 81 MG: at 08:23

## 2020-01-01 RX ADMIN — LORAZEPAM 0.5 MG: 2 INJECTION INTRAMUSCULAR; INTRAVENOUS at 01:49

## 2020-01-01 RX ADMIN — METHYLPREDNISOLONE SODIUM SUCCINATE 40 MG: 40 INJECTION, POWDER, FOR SOLUTION INTRAMUSCULAR; INTRAVENOUS at 21:00

## 2020-01-01 RX ADMIN — METHYLPREDNISOLONE SODIUM SUCCINATE 40 MG: 40 INJECTION, POWDER, FOR SOLUTION INTRAMUSCULAR; INTRAVENOUS at 08:23

## 2020-01-01 RX ADMIN — Medication 10 ML: at 05:05

## 2020-01-01 RX ADMIN — Medication 81 MG: at 12:50

## 2020-01-01 RX ADMIN — HALOPERIDOL LACTATE 3 MG: 5 INJECTION, SOLUTION INTRAMUSCULAR at 04:47

## 2020-01-01 RX ADMIN — INSULIN LISPRO 2 UNITS: 100 INJECTION, SOLUTION INTRAVENOUS; SUBCUTANEOUS at 12:48

## 2020-01-01 RX ADMIN — PIPERACILLIN AND TAZOBACTAM 2.25 G: 2; .25 INJECTION, POWDER, FOR SOLUTION INTRAVENOUS at 12:06

## 2020-01-01 RX ADMIN — METHYLPREDNISOLONE SODIUM SUCCINATE 40 MG: 40 INJECTION, POWDER, FOR SOLUTION INTRAMUSCULAR; INTRAVENOUS at 10:08

## 2020-01-01 RX ADMIN — PIPERACILLIN AND TAZOBACTAM 2.25 G: 2; .25 INJECTION, POWDER, FOR SOLUTION INTRAVENOUS at 08:35

## 2020-01-01 RX ADMIN — DOXAZOSIN 8 MG: 4 TABLET ORAL at 20:41

## 2020-01-01 RX ADMIN — MELATONIN 3 MG: at 20:32

## 2020-01-01 RX ADMIN — LORAZEPAM 0.5 MG: 2 SOLUTION, CONCENTRATE ORAL at 22:43

## 2020-01-01 RX ADMIN — METHYLPREDNISOLONE SODIUM SUCCINATE 40 MG: 40 INJECTION, POWDER, FOR SOLUTION INTRAMUSCULAR; INTRAVENOUS at 20:09

## 2020-01-01 RX ADMIN — NITROGLYCERIN 0.5 INCH: 20 OINTMENT TOPICAL at 21:34

## 2020-01-01 RX ADMIN — Medication 10 ML: at 21:08

## 2020-01-01 RX ADMIN — METHYLPREDNISOLONE SODIUM SUCCINATE 40 MG: 40 INJECTION, POWDER, FOR SOLUTION INTRAMUSCULAR; INTRAVENOUS at 10:07

## 2020-01-01 RX ADMIN — HYDRALAZINE HYDROCHLORIDE 25 MG: 25 TABLET, FILM COATED ORAL at 14:02

## 2020-01-01 RX ADMIN — INSULIN LISPRO 3 UNITS: 100 INJECTION, SOLUTION INTRAVENOUS; SUBCUTANEOUS at 17:35

## 2020-01-01 RX ADMIN — LORAZEPAM 0.5 MG: 2 SOLUTION, CONCENTRATE ORAL at 10:06

## 2020-01-01 RX ADMIN — LORAZEPAM 0.5 MG: 2 SOLUTION, CONCENTRATE ORAL at 16:25

## 2020-01-01 RX ADMIN — Medication 10 ML: at 10:19

## 2020-01-01 RX ADMIN — DOXAZOSIN 8 MG: 4 TABLET ORAL at 21:00

## 2020-01-01 RX ADMIN — DOXAZOSIN 8 MG: 4 TABLET ORAL at 21:52

## 2020-01-01 RX ADMIN — DEXTROSE MONOHYDRATE AND SODIUM CHLORIDE 75 ML/HR: 5; .45 INJECTION, SOLUTION INTRAVENOUS at 09:07

## 2020-01-01 RX ADMIN — Medication 1000 MG: at 09:33

## 2020-01-01 RX ADMIN — DEXTROSE MONOHYDRATE AND SODIUM CHLORIDE 75 ML/HR: 5; .45 INJECTION, SOLUTION INTRAVENOUS at 19:00

## 2020-01-01 RX ADMIN — Medication 1000 MG: at 10:04

## 2020-01-01 RX ADMIN — DOXAZOSIN 8 MG: 4 TABLET ORAL at 18:30

## 2020-01-01 RX ADMIN — AZITHROMYCIN MONOHYDRATE 500 MG: 500 INJECTION, POWDER, LYOPHILIZED, FOR SOLUTION INTRAVENOUS at 21:24

## 2020-01-01 RX ADMIN — MORPHINE SULFATE 2.6 MG: 100 SOLUTION ORAL at 16:03

## 2020-01-01 RX ADMIN — DOXAZOSIN 8 MG: 4 TABLET ORAL at 10:18

## 2020-01-01 RX ADMIN — MELATONIN 3 MG: at 22:30

## 2020-01-01 RX ADMIN — LORAZEPAM 0.5 MG: 2 INJECTION INTRAMUSCULAR; INTRAVENOUS at 09:27

## 2020-01-01 RX ADMIN — HEPARIN SODIUM 3000 UNITS: 1000 INJECTION, SOLUTION INTRAVENOUS; SUBCUTANEOUS at 04:40

## 2020-01-01 RX ADMIN — INSULIN LISPRO 2 UNITS: 100 INJECTION, SOLUTION INTRAVENOUS; SUBCUTANEOUS at 21:45

## 2020-01-01 RX ADMIN — PIPERACILLIN AND TAZOBACTAM 2.25 G: 2; .25 INJECTION, POWDER, FOR SOLUTION INTRAVENOUS at 20:28

## 2020-01-01 RX ADMIN — LORAZEPAM 0.5 MG: 2 INJECTION INTRAMUSCULAR; INTRAVENOUS at 13:35

## 2020-01-01 RX ADMIN — AMLODIPINE BESYLATE 10 MG: 10 TABLET ORAL at 09:28

## 2020-01-01 RX ADMIN — AMLODIPINE BESYLATE 10 MG: 10 TABLET ORAL at 09:12

## 2020-01-01 RX ADMIN — INSULIN GLARGINE 10 UNITS: 100 INJECTION, SOLUTION SUBCUTANEOUS at 21:12

## 2020-01-01 RX ADMIN — Medication 10 ML: at 20:35

## 2020-01-01 RX ADMIN — PIPERACILLIN AND TAZOBACTAM 2.25 G: 2; .25 INJECTION, POWDER, FOR SOLUTION INTRAVENOUS at 11:47

## 2020-01-01 RX ADMIN — Medication 1000 MG: at 12:50

## 2020-01-01 RX ADMIN — PIPERACILLIN AND TAZOBACTAM 2.25 G: 2; .25 INJECTION, POWDER, FOR SOLUTION INTRAVENOUS at 12:30

## 2020-01-01 RX ADMIN — ATORVASTATIN CALCIUM 40 MG: 40 TABLET, FILM COATED ORAL at 09:27

## 2020-01-01 RX ADMIN — LORAZEPAM 0.5 MG: 2 SOLUTION, CONCENTRATE ORAL at 22:53

## 2020-01-01 RX ADMIN — ZINC SULFATE 220 MG (50 MG) CAPSULE 1 CAPSULE: CAPSULE at 10:18

## 2020-01-01 RX ADMIN — Medication 81 MG: at 09:27

## 2020-01-01 RX ADMIN — Medication 5000 UNITS: at 09:33

## 2020-01-01 RX ADMIN — LORAZEPAM 0.5 MG: 2 SOLUTION, CONCENTRATE ORAL at 17:50

## 2020-01-01 RX ADMIN — LORAZEPAM 0.5 MG: 2 SOLUTION, CONCENTRATE ORAL at 09:07

## 2020-01-01 RX ADMIN — Medication 10 ML: at 22:00

## 2020-01-01 RX ADMIN — INSULIN LISPRO 3 UNITS: 100 INJECTION, SOLUTION INTRAVENOUS; SUBCUTANEOUS at 12:13

## 2020-01-01 RX ADMIN — MORPHINE SULFATE 2.6 MG: 100 SOLUTION ORAL at 11:00

## 2020-01-01 RX ADMIN — HYDRALAZINE HYDROCHLORIDE 25 MG: 25 TABLET, FILM COATED ORAL at 14:04

## 2020-01-01 RX ADMIN — LORAZEPAM 0.5 MG: 2 INJECTION INTRAMUSCULAR; INTRAVENOUS at 22:00

## 2020-01-01 RX ADMIN — DEXTROSE MONOHYDRATE 75 ML/HR: 5 INJECTION, SOLUTION INTRAVENOUS at 16:53

## 2020-01-01 RX ADMIN — HYDRALAZINE HYDROCHLORIDE 25 MG: 25 TABLET, FILM COATED ORAL at 21:41

## 2020-01-01 RX ADMIN — DOXAZOSIN 8 MG: 4 TABLET ORAL at 20:27

## 2020-01-01 RX ADMIN — Medication 10 ML: at 20:32

## 2020-01-01 RX ADMIN — METHYLPREDNISOLONE SODIUM SUCCINATE 40 MG: 40 INJECTION, POWDER, FOR SOLUTION INTRAMUSCULAR; INTRAVENOUS at 09:11

## 2020-01-01 RX ADMIN — AMLODIPINE BESYLATE 10 MG: 10 TABLET ORAL at 09:08

## 2020-01-01 RX ADMIN — HEPARIN SODIUM 4000 UNITS: 1000 INJECTION, SOLUTION INTRAVENOUS; SUBCUTANEOUS at 22:38

## 2020-01-01 RX ADMIN — HYDRALAZINE HYDROCHLORIDE 25 MG: 25 TABLET, FILM COATED ORAL at 15:29

## 2020-01-01 RX ADMIN — LORAZEPAM 0.5 MG: 2 SOLUTION, CONCENTRATE ORAL at 17:44

## 2020-01-01 RX ADMIN — AMLODIPINE BESYLATE 5 MG: 5 TABLET ORAL at 15:56

## 2020-01-01 RX ADMIN — PIPERACILLIN AND TAZOBACTAM 2.25 G: 2; .25 INJECTION, POWDER, FOR SOLUTION INTRAVENOUS at 22:29

## 2020-01-01 RX ADMIN — Medication 5000 UNITS: at 09:27

## 2020-01-01 RX ADMIN — INSULIN LISPRO 6 UNITS: 100 INJECTION, SOLUTION INTRAVENOUS; SUBCUTANEOUS at 20:57

## 2020-01-01 RX ADMIN — PIPERACILLIN AND TAZOBACTAM 2.25 G: 2; .25 INJECTION, POWDER, FOR SOLUTION INTRAVENOUS at 20:31

## 2020-01-01 RX ADMIN — ACETAMINOPHEN 650 MG: 325 TABLET ORAL at 13:00

## 2020-01-01 RX ADMIN — METHYLPREDNISOLONE SODIUM SUCCINATE 40 MG: 40 INJECTION, POWDER, FOR SOLUTION INTRAMUSCULAR; INTRAVENOUS at 21:12

## 2020-01-01 RX ADMIN — PIPERACILLIN AND TAZOBACTAM 2.25 G: 2; .25 INJECTION, POWDER, FOR SOLUTION INTRAVENOUS at 21:00

## 2020-01-01 RX ADMIN — MORPHINE SULFATE 2.6 MG: 100 SOLUTION ORAL at 17:00

## 2020-01-01 RX ADMIN — ENOXAPARIN SODIUM 70 MG: 80 INJECTION SUBCUTANEOUS at 08:22

## 2020-01-01 RX ADMIN — PIPERACILLIN AND TAZOBACTAM 2.25 G: 2; .25 INJECTION, POWDER, FOR SOLUTION INTRAVENOUS at 04:50

## 2020-01-01 RX ADMIN — HYDRALAZINE HYDROCHLORIDE 20 MG: 20 INJECTION INTRAMUSCULAR; INTRAVENOUS at 05:21

## 2020-01-01 RX ADMIN — ACETAMINOPHEN 650 MG: 325 TABLET ORAL at 14:57

## 2020-01-01 RX ADMIN — LORAZEPAM 0.5 MG: 2 INJECTION INTRAMUSCULAR; INTRAVENOUS at 18:01

## 2020-01-01 RX ADMIN — Medication 10 ML: at 17:36

## 2020-01-01 RX ADMIN — WATER 1 G: 1 INJECTION INTRAMUSCULAR; INTRAVENOUS; SUBCUTANEOUS at 23:00

## 2020-01-01 RX ADMIN — ATORVASTATIN CALCIUM 40 MG: 40 TABLET, FILM COATED ORAL at 09:33

## 2020-01-01 RX ADMIN — ATENOLOL 50 MG: 25 TABLET ORAL at 21:51

## 2020-01-01 RX ADMIN — INSULIN GLARGINE 10 UNITS: 100 INJECTION, SOLUTION SUBCUTANEOUS at 21:43

## 2020-01-01 RX ADMIN — Medication 10 ML: at 22:01

## 2020-01-01 RX ADMIN — Medication 10 ML: at 21:32

## 2020-01-01 RX ADMIN — HYDRALAZINE HYDROCHLORIDE 10 MG: 20 INJECTION, SOLUTION INTRAMUSCULAR; INTRAVENOUS at 22:19

## 2020-01-01 RX ADMIN — HYDRALAZINE HYDROCHLORIDE 25 MG: 25 TABLET, FILM COATED ORAL at 08:23

## 2020-01-01 RX ADMIN — INSULIN LISPRO 2 UNITS: 100 INJECTION, SOLUTION INTRAVENOUS; SUBCUTANEOUS at 20:32

## 2020-01-01 RX ADMIN — AZITHROMYCIN MONOHYDRATE 500 MG: 500 INJECTION, POWDER, LYOPHILIZED, FOR SOLUTION INTRAVENOUS at 20:58

## 2020-01-01 RX ADMIN — PIPERACILLIN AND TAZOBACTAM 2.25 G: 2; .25 INJECTION, POWDER, FOR SOLUTION INTRAVENOUS at 22:00

## 2020-01-01 RX ADMIN — ATORVASTATIN CALCIUM 40 MG: 40 TABLET, FILM COATED ORAL at 08:23

## 2020-01-01 RX ADMIN — HYDRALAZINE HYDROCHLORIDE 25 MG: 25 TABLET, FILM COATED ORAL at 10:18

## 2020-01-01 RX ADMIN — Medication 10 ML: at 21:13

## 2020-01-01 RX ADMIN — LORAZEPAM 0.5 MG: 2 INJECTION INTRAMUSCULAR; INTRAVENOUS at 21:41

## 2020-01-01 RX ADMIN — Medication 10 ML: at 09:34

## 2020-01-01 RX ADMIN — HYDRALAZINE HYDROCHLORIDE 25 MG: 25 TABLET, FILM COATED ORAL at 20:41

## 2020-01-01 RX ADMIN — INSULIN LISPRO 3 UNITS: 100 INJECTION, SOLUTION INTRAVENOUS; SUBCUTANEOUS at 13:19

## 2020-01-01 RX ADMIN — INSULIN LISPRO 3 UNITS: 100 INJECTION, SOLUTION INTRAVENOUS; SUBCUTANEOUS at 21:01

## 2020-01-01 RX ADMIN — Medication 10 ML: at 20:43

## 2020-01-01 RX ADMIN — SODIUM CHLORIDE 75 ML/HR: 900 INJECTION, SOLUTION INTRAVENOUS at 19:00

## 2020-01-01 RX ADMIN — ATORVASTATIN CALCIUM 40 MG: 40 TABLET, FILM COATED ORAL at 09:10

## 2020-01-01 RX ADMIN — INSULIN LISPRO 3 UNITS: 100 INJECTION, SOLUTION INTRAVENOUS; SUBCUTANEOUS at 21:42

## 2020-01-01 RX ADMIN — INSULIN GLARGINE 10 UNITS: 100 INJECTION, SOLUTION SUBCUTANEOUS at 21:47

## 2020-01-01 RX ADMIN — ASPIRIN 81 MG CHEWABLE TABLET 324 MG: 81 TABLET CHEWABLE at 21:35

## 2020-01-01 RX ADMIN — INSULIN LISPRO 2 UNITS: 100 INJECTION, SOLUTION INTRAVENOUS; SUBCUTANEOUS at 11:30

## 2020-01-01 RX ADMIN — ZINC SULFATE 220 MG (50 MG) CAPSULE 1 CAPSULE: CAPSULE at 08:23

## 2020-01-01 RX ADMIN — Medication 1000 MG: at 09:09

## 2020-01-01 RX ADMIN — HYDRALAZINE HYDROCHLORIDE 25 MG: 25 TABLET, FILM COATED ORAL at 17:22

## 2020-01-01 RX ADMIN — ATORVASTATIN CALCIUM 40 MG: 40 TABLET, FILM COATED ORAL at 10:05

## 2020-01-01 RX ADMIN — ATORVASTATIN CALCIUM 40 MG: 40 TABLET, FILM COATED ORAL at 09:11

## 2020-01-01 RX ADMIN — ZINC SULFATE 220 MG (50 MG) CAPSULE 1 CAPSULE: CAPSULE at 10:04

## 2020-01-01 RX ADMIN — Medication 10 ML: at 16:51

## 2020-01-01 RX ADMIN — INSULIN LISPRO 6 UNITS: 100 INJECTION, SOLUTION INTRAVENOUS; SUBCUTANEOUS at 16:13

## 2020-01-01 RX ADMIN — PIPERACILLIN AND TAZOBACTAM 2.25 G: 2; .25 INJECTION, POWDER, FOR SOLUTION INTRAVENOUS at 17:35

## 2020-01-01 RX ADMIN — SODIUM CHLORIDE 75 ML/HR: 900 INJECTION, SOLUTION INTRAVENOUS at 17:50

## 2020-01-01 RX ADMIN — MELATONIN 3 MG: at 21:52

## 2020-01-01 RX ADMIN — INSULIN LISPRO 3 UNITS: 100 INJECTION, SOLUTION INTRAVENOUS; SUBCUTANEOUS at 10:09

## 2020-01-01 RX ADMIN — AZITHROMYCIN MONOHYDRATE 500 MG: 500 INJECTION, POWDER, LYOPHILIZED, FOR SOLUTION INTRAVENOUS at 00:17

## 2020-01-01 RX ADMIN — Medication 10 ML: at 06:33

## 2020-01-01 RX ADMIN — MELATONIN 3 MG: at 21:41

## 2020-01-01 RX ADMIN — MELATONIN 3 MG: at 22:00

## 2020-01-01 RX ADMIN — LORAZEPAM 0.5 MG: 2 SOLUTION, CONCENTRATE ORAL at 00:56

## 2020-01-01 RX ADMIN — PIPERACILLIN AND TAZOBACTAM 2.25 G: 2; .25 INJECTION, POWDER, FOR SOLUTION INTRAVENOUS at 17:23

## 2020-01-01 RX ADMIN — HYDRALAZINE HYDROCHLORIDE 20 MG: 20 INJECTION INTRAMUSCULAR; INTRAVENOUS at 22:00

## 2020-01-01 RX ADMIN — LORAZEPAM 1 MG: 2 INJECTION INTRAMUSCULAR; INTRAVENOUS at 21:18

## 2020-01-01 RX ADMIN — HYDRALAZINE HYDROCHLORIDE 50 MG: 50 TABLET, FILM COATED ORAL at 09:33

## 2020-01-01 RX ADMIN — ATORVASTATIN CALCIUM 40 MG: 40 TABLET, FILM COATED ORAL at 09:08

## 2020-01-01 RX ADMIN — DEXTROSE MONOHYDRATE AND SODIUM CHLORIDE 75 ML/HR: 5; .45 INJECTION, SOLUTION INTRAVENOUS at 00:11

## 2020-01-01 RX ADMIN — HYDRALAZINE HYDROCHLORIDE 50 MG: 50 TABLET, FILM COATED ORAL at 21:32

## 2020-01-01 RX ADMIN — Medication 5000 UNITS: at 09:08

## 2020-01-01 RX ADMIN — INSULIN LISPRO 2 UNITS: 100 INJECTION, SOLUTION INTRAVENOUS; SUBCUTANEOUS at 09:10

## 2020-01-01 RX ADMIN — ZINC SULFATE 220 MG (50 MG) CAPSULE 1 CAPSULE: CAPSULE at 09:08

## 2020-01-01 RX ADMIN — Medication 81 MG: at 09:33

## 2020-01-01 RX ADMIN — LORAZEPAM 0.5 MG: 2 SOLUTION, CONCENTRATE ORAL at 09:11

## 2020-01-01 RX ADMIN — INSULIN LISPRO 6 UNITS: 100 INJECTION, SOLUTION INTRAVENOUS; SUBCUTANEOUS at 12:07

## 2020-01-01 RX ADMIN — METHYLPREDNISOLONE SODIUM SUCCINATE 40 MG: 40 INJECTION, POWDER, FOR SOLUTION INTRAMUSCULAR; INTRAVENOUS at 22:32

## 2020-01-01 RX ADMIN — DOXAZOSIN 8 MG: 4 TABLET ORAL at 21:41

## 2020-01-01 RX ADMIN — LORAZEPAM 0.5 MG: 2 SOLUTION, CONCENTRATE ORAL at 08:31

## 2020-01-01 RX ADMIN — AMLODIPINE BESYLATE 10 MG: 10 TABLET ORAL at 08:36

## 2020-01-01 RX ADMIN — PIPERACILLIN AND TAZOBACTAM 2.25 G: 2; .25 INJECTION, POWDER, FOR SOLUTION INTRAVENOUS at 04:43

## 2020-01-01 RX ADMIN — AMLODIPINE BESYLATE 10 MG: 10 TABLET ORAL at 09:09

## 2020-01-01 RX ADMIN — Medication 5000 UNITS: at 10:18

## 2020-01-01 RX ADMIN — Medication 10 ML: at 10:03

## 2020-01-01 RX ADMIN — Medication 10 ML: at 12:50

## 2020-01-01 RX ADMIN — HYDRALAZINE HYDROCHLORIDE 25 MG: 25 TABLET, FILM COATED ORAL at 14:57

## 2020-01-01 RX ADMIN — AMLODIPINE BESYLATE 10 MG: 10 TABLET ORAL at 09:27

## 2020-01-01 RX ADMIN — SODIUM CHLORIDE 500 ML: 9 INJECTION, SOLUTION INTRAVENOUS at 12:00

## 2020-01-01 RX ADMIN — LORAZEPAM 0.5 MG: 2 SOLUTION, CONCENTRATE ORAL at 10:49

## 2020-01-01 RX ADMIN — DEXTROSE MONOHYDRATE AND SODIUM CHLORIDE 75 ML/HR: 5; .45 INJECTION, SOLUTION INTRAVENOUS at 13:55

## 2020-01-01 RX ADMIN — MELATONIN 3 MG: at 20:27

## 2020-01-01 RX ADMIN — DOXAZOSIN 8 MG: 4 TABLET ORAL at 20:09

## 2020-01-01 RX ADMIN — LORAZEPAM 0.5 MG: 2 SOLUTION, CONCENTRATE ORAL at 05:00

## 2020-01-01 RX ADMIN — METHYLPREDNISOLONE SODIUM SUCCINATE 40 MG: 40 INJECTION, POWDER, FOR SOLUTION INTRAMUSCULAR; INTRAVENOUS at 09:08

## 2020-01-01 RX ADMIN — Medication 10 ML: at 22:52

## 2020-01-01 RX ADMIN — PIPERACILLIN AND TAZOBACTAM 2.25 G: 2; .25 INJECTION, POWDER, FOR SOLUTION INTRAVENOUS at 15:29

## 2020-01-01 RX ADMIN — MELATONIN 3 MG: at 20:41

## 2020-07-20 PROBLEM — I21.4 NSTEMI (NON-ST ELEVATED MYOCARDIAL INFARCTION) (HCC): Status: ACTIVE | Noted: 2020-01-01

## 2020-07-20 NOTE — ED NOTES
Pt continues to rest without complaint. Pt to have blood work drawn at [facility] for repeat troponin.

## 2020-07-20 NOTE — ED PROVIDER NOTES
HPI  Patient is a 79 yo male who reports developing RUQ abdominal pain with nausea x 24 hours. She states her daughter states her blood sugar has been high at a rate of 170. (+) nausea, no chest pain, no vomiting, no SOB, no diarrhea    Past Medical History:   Diagnosis Date    Essential hypertension, benign     Mitral valve disorders(424.0)     Moderate MR    Other and unspecified hyperlipidemia     Other specified cardiac dysrhythmias(427.89)     Non-sustained VT on Holter in past    Type II or unspecified type diabetes mellitus without mention of complication, not stated as uncontrolled        History reviewed. No pertinent surgical history. Family History:   Problem Relation Age of Onset    Heart Disease Other         Positive family history of ischemic heart disease.        Social History     Socioeconomic History    Marital status:      Spouse name: Not on file    Number of children: Not on file    Years of education: Not on file    Highest education level: Not on file   Occupational History    Not on file   Social Needs    Financial resource strain: Not on file    Food insecurity     Worry: Not on file     Inability: Not on file    Transportation needs     Medical: Not on file     Non-medical: Not on file   Tobacco Use    Smoking status: Never Smoker    Smokeless tobacco: Never Used   Substance and Sexual Activity    Alcohol use: No    Drug use: No    Sexual activity: Not on file   Lifestyle    Physical activity     Days per week: Not on file     Minutes per session: Not on file    Stress: Not on file   Relationships    Social connections     Talks on phone: Not on file     Gets together: Not on file     Attends Hoahaoism service: Not on file     Active member of club or organization: Not on file     Attends meetings of clubs or organizations: Not on file     Relationship status: Not on file    Intimate partner violence     Fear of current or ex partner: Not on file Emotionally abused: Not on file     Physically abused: Not on file     Forced sexual activity: Not on file   Other Topics Concern    Not on file   Social History Narrative    Not on file         ALLERGIES: Minoxidil    Review of Systems   Constitutional: Positive for appetite change. Negative for fever. HENT: Negative. Eyes: Negative. Respiratory: Negative. Negative for chest tightness, shortness of breath and wheezing. Cardiovascular: Negative for chest pain, palpitations and leg swelling. Gastrointestinal: Positive for abdominal pain (RUQ) and nausea. Negative for abdominal distention, constipation, diarrhea and vomiting. Endocrine: Negative. Genitourinary: Negative. Musculoskeletal: Negative. Skin: Negative. Allergic/Immunologic: Negative. Neurological: Negative. Hematological: Negative. Psychiatric/Behavioral: Negative. All other systems reviewed and are negative. Vitals:    07/19/20 1939   BP: (!) 142/98   Pulse: (!) 56   Resp: 18   Temp: 99.1 °F (37.3 °C)   SpO2: 98%   Weight: 82.6 kg (182 lb)   Height: 5' 6\" (1.676 m)            Physical Exam  Vitals signs and nursing note reviewed. Constitutional:       General: She is not in acute distress. Appearance: She is well-developed. She is not ill-appearing, toxic-appearing or diaphoretic. HENT:      Head: Normocephalic. Right Ear: External ear normal.      Left Ear: External ear normal.      Mouth/Throat:      Pharynx: No oropharyngeal exudate. Eyes:      General: No scleral icterus. Right eye: No discharge. Left eye: No discharge. Conjunctiva/sclera: Conjunctivae normal.      Pupils: Pupils are equal, round, and reactive to light. Neck:      Musculoskeletal: Normal range of motion and neck supple. Thyroid: No thyromegaly. Vascular: No JVD. Trachea: No tracheal deviation. Cardiovascular:      Rate and Rhythm: Normal rate and regular rhythm.       Heart sounds: Normal heart sounds. No murmur. No friction rub. No gallop. Pulmonary:      Effort: Pulmonary effort is normal. No respiratory distress. Breath sounds: Normal breath sounds. No stridor. No wheezing or rales. Chest:      Chest wall: No tenderness. Abdominal:      General: Abdomen is protuberant. Bowel sounds are normal. There is no distension. Palpations: Abdomen is soft. There is no mass. Tenderness: There is abdominal tenderness (minimal). There is no right CVA tenderness, left CVA tenderness, guarding or rebound. Negative signs include Anthony's sign. Hernia: There is no hernia in the umbilical area or ventral area. Musculoskeletal: Normal range of motion. General: No tenderness. Lymphadenopathy:      Cervical: No cervical adenopathy. Skin:     General: Skin is warm and dry. Coloration: Skin is not pale. Findings: No erythema or rash. Neurological:      General: No focal deficit present. Mental Status: She is alert. Cranial Nerves: No cranial nerve deficit. Motor: No abnormal muscle tone. Coordination: Coordination normal.      Deep Tendon Reflexes: Reflexes normal.          MDM  Number of Diagnoses or Management Options     Amount and/or Complexity of Data Reviewed  Clinical lab tests: ordered and reviewed  Tests in the radiology section of CPT®: ordered and reviewed    Risk of Complications, Morbidity, and/or Mortality  Presenting problems: high  Diagnostic procedures: high  Management options: high           Procedures    Hospital course: EKG showed patient have inferior ischemia. Patient troponin came back positive for a non-STEMI. Troponin of 0.84. Cardiologist on-call was notified and patient was self admission with  hospitalist.    Case discussed with Nadiya Cho MD (cardiologist). He will consult on patient. Case discussed with Dr. Dayton Curling. Patient accepted to hospitalist service.     Dx: non STEMI    Disp: admit Dictation disclaimer:  Please note that this dictation was completed with [employer], the computer voice recognition software. Quite often unanticipated grammatical, syntax, homophones, and other interpretive errors are inadvertently transcribed by the computer software. Please disregard these errors. Please excuse any errors that have escaped final proofreading.

## 2020-07-20 NOTE — PROGRESS NOTES
[facility] admission  Patient presented to [place] emergency room with abdominal pain right upper quadrant and general complaints of not feeling well. I received sign out from the [place] [address] [facility], Dr. Lizette Lindsey. Patient was found to have a troponin of 0.87 with EKG findings suggestive of inferior wall MI. Case was discussed by Dr. Lizette Lindsey with Dr. Bassam Momin heparin drip was initiated. Patient with film findings consistent with CHF and Lasix will be administered. Patient is receiving a CT without contrast for her upper abdominal pain. ZION with a creatinine of 2.4. Patient to be admitted to telemetry at [facility] with an NSTEMI.

## 2020-07-20 NOTE — ED NOTES
When I arrived to the emergency department to start my shift, patient was already admitted for an NSTEMI. I discussed case with Dr. Fiorella Rausch who states he admitted to Dr. Celestine Gutierrez, and that cardiology has been consulted, patient is heparin. [address] patient who is in no apparent distress. Vital signs are stable. 11:10 AM  Patient's RN notified me that he just found that there was never a bed ordered for the admission. I will put in the admission under Dr. Celestine Gutierrez. Discussed patient's case further with Dr. Fiorella Rausch who is the oncoming ED attending. Patient still awaiting bed assignment.

## 2020-07-20 NOTE — ED NOTES
Pt family, son Stephen Solano has been updated on pt condition and plan of care after verbal permission given to this nurse by pt.

## 2020-07-20 NOTE — ED NOTES
Pt to be inpatient admission at [facility]", awaiting bed assignment. Pt resting without complaint. Call bell in reach.

## 2020-07-20 NOTE — ED NOTES
Spoke with Dr. Isaiah Ansari, Cardiology regarding Heparin drip and recent ptt >180. Informed that rate was at 390 units/ml when received recent result, per protocol instructs to stop drip for 60 min, decrease by 600 units/ml and restart, advised what to due as unable to reduce by 600 units/ml. Per Dr. Isaiah Ansari hold heparin drip for 3 hours, repeat PTT, if within therapeutic range restart at 250 units/hr and continue protocal. If still >180 then hold another 3 hours and repeat PTT.

## 2020-07-21 PROBLEM — J18.9 PNA (PNEUMONIA): Status: ACTIVE | Noted: 2020-01-01

## 2020-07-21 NOTE — H&P
Date of Admission: 7/19/2020      Assessment:   NSTEMI: Heparin drip was initiated by the emergency department. Cardiology has been consulted. BNP 3848  Multifocal pneumonia with hypoxia  COVID 19 infection and pneumonia. Hypertension  Mild hyponatremia: Suspect due to pulmonary process  Mitral valve disorder    Plan:   Discussed with Dr. Violet Owens. We will continue with heparin drip with PT PTT per protocol. We will continue with aspirin, beta-blocker, Cardura, Norvasc, Lipitor. Sliding scale insulin and Accu-Cheks. And Lantus  Continue with droplet precautions  We will send off for formal COVID testing given positive rapid.   -Discussed with nursing. CBC, BMP in a.m. Ultimately will need echocardiogram and left lower extremity PVL  O2 nasal cannula to maintain sats greater than 92%   start patient on zinc, vitamin C, vitamin D, melatonin  Ceftriaxone and azithromycin      Contact- daughter [phone]  Gay Cogan (niece) will be the point of contact for the next 5-7 days    ** please call [facility] with emergencies [phone]    Patient is full code per family. I have advised the patient's daughter as well as other family members who reside in the home that they are to remain in quarantine for at least 14 days. They are to report to healthcare facility if they were develop symptoms. I have advised the family that they are at high risk for also having a Cobin infection. Arvin Ordonez D.O. Internal Medicine and Infectious Diseases      Subjective:    Patient is a 80 y. o.female who is being evaluated for NSTEMI. Ms. Amilcar Sargent is a 80 with a hx of HTN, MV disorder, hyperlipidemia, and DM type 2 who presented to the ED by her daughter because she was sleeping too much. Most of the history is obtained from the patient's daughter as the patient is somewhat confused at the time of my evaluation.   Per the patient's daughter the patient is generally not usually confused but feels she is not answering appropriately because she is not able to hear as well. The patient denies any shortness of breath cough chest pain fevers or chills. She has had 1 loose bowel movement. She denies any abdominal pain her appetite is okay. She notes that she was having pain in her left foot which has since improved. She states that she was on her way to her son's  in [place] when [place] family dropped her off at the emergency room. Past Medical History:   Diagnosis Date    Essential hypertension, benign     Mitral valve disorders(424.0)     Moderate MR    Other and unspecified hyperlipidemia     Other specified cardiac dysrhythmias(427.89)     Non-sustained VT on Holter in past    Type II or unspecified type diabetes mellitus without mention of complication, not stated as uncontrolled      History reviewed. No pertinent surgical history. Family History   Problem Relation Age of Onset    Heart Disease Other         Positive family history of ischemic heart disease.      Medications reviewed as below:   Current Facility-Administered Medications   Medication Dose Route Frequency Provider Last Rate Last Dose    hydrALAZINE (APRESOLINE) tablet 50 mg  50 mg Oral TID Cheyenne Hardin NP        amLODIPine (NORVASC) tablet 5 mg  5 mg Oral DAILY Cheyenne Hardin NP   5 mg at 20 1556    doxazosin (CARDURA) tablet 8 mg  8 mg Oral QHS Cheyenne Hardin NP        hydrALAZINE (APRESOLINE) 20 mg/mL injection 10 mg  10 mg IntraVENous Q6H PRN Cheyenne Hardin, NP        sodium chloride (NS) flush 5-40 mL  5-40 mL IntraVENous Q8H Shannon Flores MD   10 mL at 20 2694    sodium chloride (NS) flush 5-40 mL  5-40 mL IntraVENous PRN Shannon Flores MD   10 mL at 20 2135    heparin 25,000 units in D5W 250 ml infusion  12-25 Units/kg/hr IntraVENous TITRATE Shannon Flores MD 1.3 mL/hr at 20 1635 125 Units/hr at 20 1635     Allergies   Allergen Reactions    Minoxidil Other (comments)     edema     Social History     Socioeconomic History    Marital status:      Spouse name: Not on file    Number of children: Not on file    Years of education: Not on file    Highest education level: Not on file   Occupational History    Not on file   Social Needs    Financial resource strain: Not on file    Food insecurity     Worry: Not on file     Inability: Not on file    Transportation needs     Medical: Not on file     Non-medical: Not on file   Tobacco Use    Smoking status: Never Smoker    Smokeless tobacco: Never Used   Substance and Sexual Activity    Alcohol use: No    Drug use: No    Sexual activity: Not on file   Lifestyle    Physical activity     Days per week: Not on file     Minutes per session: Not on file    Stress: Not on file   Relationships    Social connections     Talks on phone: Not on file     Gets together: Not on file     Attends Amish service: Not on file     Active member of club or organization: Not on file     Attends meetings of clubs or organizations: Not on file     Relationship status: Not on file    Intimate partner violence     Fear of current or ex partner: Not on file     Emotionally abused: Not on file     Physically abused: Not on file     Forced sexual activity: Not on file   Other Topics Concern    Not on file   Social History Narrative    Not on file        Review of Systems    Negative Unless BOLDED    General: fevers, chills, myalgias, arthralgias, unexplained weight loss, malaise, fatigue. HEENT:  headaches,sinus pain or presure, recent URI, recent dental procedures;  tinnitus, hearing loss , visual changes, catarats, dizziness or blurred vision  PUlMONARY:  cough , shortness of breath, sputum production, hx of asthma or COPD. previous treatement for TB or PPD.   Cardiovascular: chest pain, previous CAD/MI, vavlular heart disease,  murmurs  GI:   nausea, vomiting, diarrhea, abdominal pain, prior C.diff  :  urinary frequency, dysuria, hematuria, bladder incontinence. Neurologic:  seizures, syncope or prior CVA/TIA, confusion, memory impairment, neuropathy  Musculoskeletal:  myalgias arthralgias, joint pain/ swelling,  back pain  Skin:  Purities,  recurrent cellulitis,  chronic stasis ulcer, diabetic foot ulcers  Endocrine: polyuria, polydipsia, hair loss, weight gain  Psych: Denies depression or treatment by a psychiatrist/psycologist  Heme-Onc: prior DVT, easy bruising, fatigue, malignancy        Objective:        Visit Vitals  /54   Pulse (!) 57   Temp 98 °F (36.7 °C)   Resp 24   Ht 5' 6\" (1.676 m)   Wt 81.6 kg (180 lb)   SpO2 95%   BMI 29.05 kg/m²     Temp (24hrs), Av.3 °F (36.8 °C), Min:98 °F (36.7 °C), Max:98.6 °F (37 °C)        General:   awake alert and oriented to person   Skin:   no rashes or skin lesions noted on limited exam   HEENT:  Normocephalic, atraumatic, PERRL, EOMI, no scleral icterus or pallor; no conjunctival hemmohage;  nasal and oral mucous are moist and without evidence of lesions. No thrush. Neck supple, no bruits. Lymph Nodes:   no cervical, axillary or inguinal adenopathy   Lungs:   non-labored, bilaterally clear to aspiration- no crackles wheezes rales or rhonchi   Heart:  RRR, s1 and s2; 2/6 murmur; no  rubs or gallops, no edema, + pedal pulses   Abdomen:  soft, non-distended, active bowel sounds, no hepatomegaly, no splenomegaly. Non-tender   Genitourinary:  deferred   Extremities:   no clubbing, cyanosis; no joint effusions or swelling; Full ROM of all large joints to the upper and lower extremities; muscle mass appropriate for age; LLE slightly warmer than right. Neurologic:  No gross focal sensory abnormalities; 5/5 muscle strength to upper and lower extremities. Speech appropirate. Cranial nerves intact   Psychiatric:   appropriate and interactive.        Labs: Results:   Chemistry Recent Labs     20  0745 20   * 106*   * 133*   K 3.8 4.4    105 CO2 25 23   BUN 49* 56*   CREA 1.62* 2.20*   CA 8.6 8.9   AGAP 7 5   BUCR 30* 25*   AP  --  59   TP  --  7.6   ALB  --  2.7*   GLOB  --  4.9*   AGRAT  --  0.6*      CBC w/Diff Recent Labs     07/21/20  0506 07/19/20 2010   WBC 5.3 5.8   RBC 4.36 4.73   HGB 12.7 14.1   HCT 40.0 43.2    180   GRANS 82* 83*   LYMPH 12* 11*   EOS 0 0            No results found for: SDES No results found for: CULT       Imaging:      All imaging reviewed from Admission to present as per radiology interpretation in [facility]

## 2020-07-21 NOTE — ED NOTES
Heparin restarted at 125 units/hr per Dr. Victor Hugo Sutton after talking with Dr. Doris Matta, cardiology who wants heparin restarted and continued.

## 2020-07-21 NOTE — ED NOTES
Spoke with RAIN Sadler, cardiology, re PTT >180 after 6 hours of heparin at 125 units/hr. Informed of pt's history while in ed of elevated ptt's on heparin even with low doses. Per Ms. Inge hold Heparin for now, recheck PTT 3 hours from last and call with results.

## 2020-07-21 NOTE — ED NOTES
Spoke with Doctor Ortega Brooks, Heparin rate is 250 units/hr and PTT resulted > 180, As protocol states to decrease by 600units/hr after stopping for 60 minutes, however rate is only 250 Units per hour, Dr Ortega Brooks ordered Heparin to be stopped 3 hours then decrease rate to 125units/Hr.

## 2020-07-21 NOTE — ED NOTES
Verbal shift change report given to Manasa Leon (oncoming nurse) by Aminah Haile RN (offgoing nurse). Report included the following information SBAR, Kardex, MAR, Recent Results and Cardiac Rhythm Sinus Bradycardia. Angel Bose

## 2020-07-21 NOTE — ED NOTES
Dr Karolina Camarillo ordered a PTT prior to restarting Heparin Drip, 0230: PTT resulted at 54.2, Dr Karolina Camarillo ordered Heparin Drip to resume at 125 Units/Hr.

## 2020-07-21 NOTE — PROGRESS NOTES
conducted an initial consultation and Spiritual Assessment for Misty Anaya, who is a 80 y.o.,female. Patient's Primary Language is: [place]. According to the patient's EMR Moravian Affiliation is: [facility].     The reason the Patient came to the hospital is:   Patient Active Problem List    Diagnosis Date Noted    NSTEMI (non-ST elevated myocardial infarction) ([facility] 07/20/2020    Mitral valve disorder     NSVT (nonsustained ventricular tachycardia) ([facility]     Essential hypertension, benign     Hyperlipemia     DM2 (diabetes mellitus, type 2) ([facility]         The  provided the following Interventions:  Initiated a relationship of care and support. Explored issues of carter, belief, spirituality and Scientology/ritual needs while hospitalized. Listened empathically. Provided chaplaincy education. Provided information about Spiritual Care Services. Offered prayer and assurance of continued prayers on patient's behalf. Chart reviewed. The following outcomes where achieved:  Patient shared limited information about both their medical narrative and spiritual journey/beliefs.  confirmed Patient's Moravian Affiliation. Patient processed feeling about current hospitalization. Patient expressed gratitude for 's visit. Assessment:  Patient does not have any Scientology/cultural needs that will affect patient's preferences in health care. There are no spiritual or Scientology issues which require intervention at this time. Plan:  Chaplains will continue to follow and will provide pastoral care on an as needed/requested basis.  recommends bedside caregivers page  on duty if patient shows signs of acute spiritual or emotional distress.     [address]   [phone]

## 2020-07-21 NOTE — PROGRESS NOTES
Jayson Mora, RN spoke to Octavia Fuller in lab and was advised that pt was positive for COVID. Dr. Sakina Boyd was on the floor at the time of the call and will place transfer orders.

## 2020-07-21 NOTE — ROUTINE PROCESS
TRANSFER - OUT REPORT:    Verbal report given to [facility], RN(name) on Lanette Dailey  being transferred to (unit) for routine progression of care       Report consisted of patients Situation, Background, Assessment and   Recommendations(SBAR). Information from the following report(s) SBAR, ED Summary, Procedure Summary, MAR, Recent Results and Cardiac Rhythm sinus was reviewed with the receiving nurse. Lines:   Peripheral IV 07/19/20 Left Wrist (Active)       Peripheral IV 07/21/20 Right Hand (Active)   Site Assessment Clean, dry, & intact 07/21/20 1553   Phlebitis Assessment 0 07/21/20 1553   Infiltration Assessment 0 07/21/20 1553   Dressing Status Clean, dry, & intact 07/21/20 1553   Dressing Type Transparent 07/21/20 1553        Opportunity for questions and clarification was provided.       Patient transported with:   Monitor   Heparin drip

## 2020-07-21 NOTE — ED NOTES
7:26 AM  When arrived to start my shift this morning, patient was already admitted and waiting a bed assignment. Discussed case with Dr. Vidhya Chung. Patient with NSTEMI and getting heparin protocol. [address] patient and in no distress, no new complaint. RN notified me that PTT is above 180. We will discuss with Dr. Danish Brito regarding continuance of heparin. Plan has been decided with Dr. Valerie Grossman. Will hold heparin for 3 hours and recheck PTT, and if within therapeutic range we will restart heparin at 250 units/h and continue the protocol. See ZINA Chang's note regarding this. I further discussed patient's case with Dr. Vidhya Chung at shift change.   Patient is still awaiting a bed assignment

## 2020-07-22 NOTE — PROGRESS NOTES
Bedside shift change report given to Lebron Negrete 930 (oncoming nurse) by Noemi Rodriguez RN (offgoing nurse). Report included the following information SBAR, Kardex and MAR.

## 2020-07-22 NOTE — PROGRESS NOTES
helped the patient Iwona Jeong, who is a 80 y.o.,female, connect with their family with the Zoom Video Connection. The  provided the following Interventions: With ZINA Cevallos's assistance, patient's family members were able to do virtual visit via zoom from 1800  to 1830 pm. : Susi Maldonado (granddaughter) with email: [email or web address] [phone]. The following outcomes were achieved:  Patient's family expressed gratitude for 's assistance in setting up the zoom call. Assessment:  There are no further spiritual or Hinduism issues which require Spiritual Care Service interventions at this time. Plan:  Chaplains will continue to follow and will provide pastoral care on an as needed/requested basis.  recommends bedside caregivers page  on duty if patient shows signs of acute spiritual or emotional distress. Leilani CORREIA  [address], [address] [facility]   Spiritual Care   [phone]

## 2020-07-22 NOTE — PROGRESS NOTES
Problem: Falls - Risk of  Goal: *Absence of Falls  Outcome: Progressing Towards Goal  Note: Fall Risk Interventions:       Mentation Interventions: Adequate sleep, hydration, pain control    Medication Interventions: Evaluate medications/consider consulting pharmacy                   Problem: Patient Education: Go to Patient Education Activity  Goal: Patient/Family Education  Outcome: Progressing Towards Goal

## 2020-07-22 NOTE — PROGRESS NOTES
[facility] Hospitalist Group  Progress Note    Patient: Marian Cameron Age: 80 y.o. : 2/15/1929 MR#: [id number] SSN: [id number]  Date/Time: 2020    Subjective:     Patient is laying in bed in no apparent distress, opens eyes on command, likely has dementia    Assessment/Plan:     Non-ST elevation MI  COVID-19 infection  Multifocal pneumonia  Hypoxia  Type 2 diabetes  Hypertension  Dementia  Hypoxia, I doubt respiratory failure at this time    Plan  Cardiology is following, check echo  Continue antibiotics, follow cultures  Discussed with ID, will start heparin drip for COVID infection and will also start IV Solu-Medrol.   Continue vitamin supplements  Monitor blood pressure  Monitor blood sugars, on Lantus and sliding scale insulin  Palliative care has been consulted    Case discussed with:  [x]Patient  []Family  []Nursing  []Case Management  DVT Prophylaxis:  []Lovenox  []Hep SQ  []SCDs  []Coumadin   [x]On Heparin gtt    Objective:   VS:   Visit Vitals  /45 (BP 1 Location: Right arm, BP Patient Position: At rest)   Pulse 60   Temp (!) 96.3 °F (35.7 °C) Comment: notified Madina Good, RN   Resp 20   Ht 5' 6\" (1.676 m)   Wt 81.6 kg (180 lb)   SpO2 99%   BMI 29.05 kg/m²      Tmax/24hrs: Temp (24hrs), Av.4 °F (36.3 °C), Min:96.3 °F (35.7 °C), Max:98 °F (36.7 °C)    Input/Output:     Intake/Output Summary (Last 24 hours) at 2020 1741  Last data filed at 2020 0409  Gross per 24 hour   Intake 565.53 ml   Output 0 ml   Net 565.53 ml       General:  Awake, likely has dementia  Cardiovascular:  S1S2+, RRR  Pulmonary: Coarse breath sounds bilaterally  GI:  Soft, BS+, NT, ND  Extremities:  trace edema    Labs:    Recent Results (from the past 24 hour(s))   GLUCOSE, POC    Collection Time: 20 11:19 PM   Result Value Ref Range    Glucose (POC) 168 (H) 70 - 110 mg/dL   PTT    Collection Time: 20  2:03 AM   Result Value Ref Range    aPTT 29.8 23.0 - 36.4 SEC   CBC WITH AUTOMATED DIFF    Collection Time: 07/22/20  2:03 AM   Result Value Ref Range    WBC 6.3 4.6 - 13.2 K/uL    RBC 4.30 4.20 - 5.30 M/uL    HGB 12.8 12.0 - 16.0 g/dL    HCT 38.4 35.0 - 45.0 %    MCV 89.3 74.0 - 97.0 FL    MCH 29.8 24.0 - 34.0 PG    MCHC 33.3 31.0 - 37.0 g/dL    RDW 14.2 11.6 - 14.5 %    PLATELET 685 884 - 216 K/uL    MPV 11.8 9.2 - 11.8 FL    NEUTROPHILS 81 (H) 42 - 75 %    LYMPHOCYTES 15 (L) 20 - 51 %    MONOCYTES 1 (L) 2 - 9 %    EOSINOPHILS 0 0 - 5 %    BASOPHILS 1 0 - 3 %    OTHER CELL 2 (H) 0      ABS. NEUTROPHILS 5.1 1.8 - 8.0 K/UL    ABS. LYMPHOCYTES 0.9 0.8 - 3.5 K/UL    ABS. MONOCYTES 0.1 0 - 1.0 K/UL    ABS. EOSINOPHILS 0.0 0.0 - 0.4 K/UL    ABS.  BASOPHILS 0.1 (H) 0.0 - 0.06 K/UL    DF MANUAL      PLATELET COMMENTS ADEQUATE PLATELETS      RBC COMMENTS NORMOCYTIC, NORMOCHROMIC     METABOLIC PANEL, BASIC    Collection Time: 07/22/20  2:03 AM   Result Value Ref Range    Sodium 139 136 - 145 mmol/L    Potassium 3.8 3.5 - 5.5 mmol/L    Chloride 107 100 - 111 mmol/L    CO2 24 21 - 32 mmol/L    Anion gap 8 3.0 - 18 mmol/L    Glucose 160 (H) 74 - 99 mg/dL    BUN 48 (H) 7.0 - 18 MG/DL    Creatinine 1.62 (H) 0.6 - 1.3 MG/DL    BUN/Creatinine ratio 30 (H) 12 - 20      GFR est AA 36 (L) >60 ml/min/1.73m2    GFR est non-AA 30 (L) >60 ml/min/1.73m2    Calcium 8.8 8.5 - 10.1 MG/DL   GLUCOSE, POC    Collection Time: 07/22/20  8:57 AM   Result Value Ref Range    Glucose (POC) 198 (H) 70 - 110 mg/dL   PTT    Collection Time: 07/22/20 11:41 AM   Result Value Ref Range    aPTT 38.5 (H) 23.0 - 36.4 SEC   LIPID PANEL    Collection Time: 07/22/20 11:41 AM   Result Value Ref Range    LIPID PROFILE          Cholesterol, total 108 <200 MG/DL    Triglyceride 151 (H) <150 MG/DL    HDL Cholesterol 34 (L) 40 - 60 MG/DL    LDL, calculated 43.8 0 - 100 MG/DL    VLDL, calculated 30.2 MG/DL    CHOL/HDL Ratio 3.2 0 - 5.0     GLUCOSE, POC    Collection Time: 07/22/20 11:50 AM   Result Value Ref Range    Glucose (POC) 167 (H) 70 - 110 mg/dL   GLUCOSE, POC    Collection Time: 07/22/20  4:38 PM   Result Value Ref Range    Glucose (POC) 160 (H) 70 - 110 mg/dL     Additional Data Reviewed:      Signed By: Cici Martinez MD     July 22, 2020

## 2020-07-22 NOTE — CONSULTS
[facility] - Consult Note    Date of  Admission: 7/19/2020  7:44 PM     Primary Care Physician:  Jazmyn Nava MD     Plan:       1. NSTEMI- positive troponin. Now down trending. ekg with ST-T wave abnormality in inferior and lateral leads. Discontinue heparin drip. She completed 48 hours anticoagulation. Denies chest pain. No chest pressure. No SOB. No CHF symptoms. Continue medical management. Asa.statin. bb    2. COV-19 - rapid test 7/21/20 detected- being managed by medical team   3. Hypertension- uncontrolled on admission. This am BP appears well controlled. Patient on multiple medications for BP control. Will monitor and adjust medications as needed. 4. Hx of nonsustained ventricular tachycardia- no recurrence on this admission will monitor on telemetry    5. Hyperlipidemia- follow lipid panel continue statin    6. Type 2 diabetes mellitus without complication- medication per medical team      7. Hx of Mitral valve disorder-mild on echo 2015        XR Results (most recent):  Results from Hospital Encounter encounter on 07/19/20   XR CHEST PORT    Narrative Examination: Portable AP chest     History: Right upper quadrant pain    Comparison: None    Findings: Extensive patchy multifocal airspace disease is seen and suspicious  for multifocal pneumonia. No definite pleural effusion. No pneumothorax. Heart  size is enlarged. Atherosclerosis. Impression Impression:  1. Extensive patchy multifocal airspace disease could be multifocal pneumonia  although pulmonary edema is within the differential.              Assessment:     Hospital Problems  Date Reviewed: 1/26/2017          Codes Class Noted POA    PNA (pneumonia) ICD-10-CM: J18.9  ICD-9-CM: 123  7/21/2020 Unknown        NSTEMI (non-ST elevated myocardial infarction) [facility]) ICD-10-CM: I21.4  ICD-9-CM: 410.70  7/20/2020 Unknown                   History of Present Illness:          This is a 80 y.o. female admitted for NSTEMI (non-ST elevated myocardial infarction) ([facility] [address] [I21.4]. Patient with PMHx of hypertension, MR, non sustained VT, diabetes. The patient presented to the [facility] ED with c/o abdominal pain and sleepiness. Patient is poor historian and confused. information obtained reviewing patient records. In the ED patient reported  abdominal pain with nausea and c/o  blood sugar has been high at a rate of 170. In ED initial w/u showed elevated troponin of 0.85 ekg shows ST-T wave abnormality in inferior and lateral leads. Creatinine 2.20 . Abdomen and pelvis CT revealed no acute pathology in the abdomen or pelvis but Multifocal groundglass opacities in the lower lobes and consolidation in the right middle lobe. Concerns with possible COVID. Patient rapid test COV-19 was positive. On my exam she is resting comfortable no SOB, no chest pain no chest pressure. Denies  abdominal pain. She is confused. .     Past Medical History:     Past Medical History:   Diagnosis Date    Essential hypertension, benign     Mitral valve disorders(424.0)     Moderate MR    Other and unspecified hyperlipidemia     Other specified cardiac dysrhythmias(427.89)     Non-sustained VT on Holter in past    Type II or unspecified type diabetes mellitus without mention of complication, not stated as uncontrolled          Social History:     Social History     Socioeconomic History    Marital status:      Spouse name: Not on file    Number of children: Not on file    Years of education: Not on file    Highest education level: Not on file   Tobacco Use    Smoking status: Never Smoker    Smokeless tobacco: Never Used   Substance and Sexual Activity    Alcohol use: No    Drug use: No        Family History:     Family History   Problem Relation Age of Onset    Heart Disease Other         Positive family history of ischemic heart disease. Medications:      Allergies   Allergen Reactions    Minoxidil Other (comments)     edema        Current Facility-Administered Medications   Medication Dose Route Frequency    hydrALAZINE (APRESOLINE) tablet 50 mg  50 mg Oral TID    amLODIPine (NORVASC) tablet 5 mg  5 mg Oral DAILY    doxazosin (CARDURA) tablet 8 mg  8 mg Oral QHS    hydrALAZINE (APRESOLINE) 20 mg/mL injection 10 mg  10 mg IntraVENous Q6H PRN    aspirin chewable tablet 81 mg  81 mg Oral DAILY    atorvastatin (LIPITOR) tablet 40 mg  40 mg Oral DAILY    insulin glargine (LANTUS) injection 10 Units  10 Units SubCUTAneous QHS    atenoloL (TENORMIN) tablet 100 mg  100 mg Oral BID    insulin lispro (HUMALOG) injection   SubCUTAneous AC&HS    glucose chewable tablet 16 g  4 Tab Oral PRN    glucagon (GLUCAGEN) injection 1 mg  1 mg IntraMUSCular PRN    dextrose (D50W) injection syrg 12.5-25 g  25-50 mL IntraVENous PRN    zinc sulfate (ZINCATE) 220 (50) mg capsule 1 Cap  1 Cap Oral DAILY    cholecalciferol (VITAMIN D3) capsule 5,000 Units  5,000 Units Oral DAILY    ascorbic acid (vitamin C) (VITAMIN C) tablet 1,000 mg  1,000 mg Oral DAILY    melatonin tablet 3 mg  3 mg Oral QHS PRN    cefTRIAXone (ROCEPHIN) 1 g in sterile water (preservative free) 10 mL IV syringe  1 g IntraVENous Q24H    azithromycin (ZITHROMAX) 500 mg in  mL  500 mg IntraVENous Q24H    sodium chloride (NS) flush 5-40 mL  5-40 mL IntraVENous Q8H    sodium chloride (NS) flush 5-40 mL  5-40 mL IntraVENous PRN        Review Of Systems:           Constitutional: confused.    HEENT: No epistaxis, no nasal drainage, no difficulty in swallowing, no redness in eyes  Respiratory:  negative for cough, sputum, hemoptysis, pleurisy/chest pain, wheezing, dyspnea on exertion or emphysema  Cardiovascular: no chest pain, no chest pressure, no dyspnea, no pnd, no claudication no palpitations, no chronic leg edema, no syncope  Gastrointestinal: abd pain  Genitourinary: No urinary symptoms or hematuria  Integument/breast: No ulcers or rashes  Musculoskeletal: no muscle pain, no weakness  Neurological: No focal weakness, no seizures, no headaches  Behvioral/Psych: No anxiety, no depression             Physical Exam:     Visit Vitals  /46 (BP 1 Location: Right arm, BP Patient Position: At rest)   Pulse 70   Temp 97.7 °F (36.5 °C)   Resp 21   Ht 5' 6\" (1.676 m)   Wt 81.6 kg (180 lb)   SpO2 96%   BMI 29.05 kg/m²     BP Readings from Last 3 Encounters:   07/22/20 129/46   01/26/17 147/59   08/25/16 191/84     Pulse Readings from Last 3 Encounters:   07/22/20 70   01/26/17 (!) 58   08/25/16 60     Wt Readings from Last 3 Encounters:   07/19/20 81.6 kg (180 lb)   01/26/17 82.6 kg (182 lb)   08/25/16 83 kg (183 lb)       General:  alert, appears stated age, confused, disoriented  Skin: Warm and dry, acyanotic, normal color. Head: Normocephalic, atraumatic. Eyes: Sclerae anicteric, conjunctivae without injection. Neck:  nontender, no nuchal rigidity, no masses, no stridor, no carotid bruit, no JVD  Lungs:   diminished breath sounds b/l. Heart:  regular rate and rhythm, S1, S2 normal, no click, no rub  Abdomen:  abdomen is soft without significant tenderness, masses, organomegaly or guarding  Extremities:  extremities normal, atraumatic, no cyanosis or edema. Peripheral pulses   Neurological: grossly intact. No focal abnormalities, moves all extremities well.   Psychiatric Affect: The patient is awake, alert and oriented to person only    Data Review:     Recent Results (from the past 48 hour(s))   PTT    Collection Time: 07/20/20  1:19 PM   Result Value Ref Range    aPTT >180.0 (HH) 23.0 - 36.4 SEC   PTT    Collection Time: 07/20/20  5:13 PM   Result Value Ref Range    aPTT 62.8 (H) 23.0 - 36.4 SEC   PTT    Collection Time: 07/20/20 10:54 PM   Result Value Ref Range    aPTT >180.0 (HH) 23.0 - 36.4 SEC   HEMOGLOBIN A1C WITH EAG    Collection Time: 07/21/20  2:03 AM   Result Value Ref Range    Hemoglobin A1c 6.9 (H) 4.2 - 5.6 %    Est. average glucose 151 mg/dL   C REACTIVE PROTEIN, QT Collection Time: 07/21/20  2:03 AM   Result Value Ref Range    C-Reactive protein 8.6 (H) 0 - 0.3 mg/dL   CK    Collection Time: 07/21/20  2:03 AM   Result Value Ref Range     26 - 192 U/L   D DIMER    Collection Time: 07/21/20  2:03 AM   Result Value Ref Range    D DIMER 3.84 (H) <0.46 ug/ml(FEU)   LD    Collection Time: 07/21/20  2:03 AM   Result Value Ref Range     (H) 81 - 234 U/L   FERRITIN    Collection Time: 07/21/20  2:03 AM   Result Value Ref Range    Ferritin 760 (H) 8 - 388 NG/ML   PTT    Collection Time: 07/21/20  2:30 AM   Result Value Ref Range    aPTT 54.2 (H) 23.0 - 36.4 SEC   CBC WITH AUTOMATED DIFF    Collection Time: 07/21/20  5:06 AM   Result Value Ref Range    WBC 5.3 4.6 - 13.2 K/uL    RBC 4.36 4.20 - 5.30 M/uL    HGB 12.7 12.0 - 16.0 g/dL    HCT 40.0 35.0 - 45.0 %    MCV 91.7 74.0 - 97.0 FL    MCH 29.1 24.0 - 34.0 PG    MCHC 31.8 31.0 - 37.0 g/dL    RDW 14.2 11.6 - 14.5 %    PLATELET 460 964 - 316 K/uL    MPV 11.9 (H) 9.2 - 11.8 FL    NEUTROPHILS 82 (H) 42 - 75 %    LYMPHOCYTES 12 (L) 20 - 51 %    MONOCYTES 4 2 - 9 %    EOSINOPHILS 0 0 - 5 %    BASOPHILS 0 0 - 3 %    OTHER CELL 2 (H) 0      ABS. NEUTROPHILS 4.3 1.8 - 8.0 K/UL    ABS. LYMPHOCYTES 0.6 (L) 0.8 - 3.5 K/UL    ABS. MONOCYTES 0.2 0 - 1.0 K/UL    ABS. EOSINOPHILS 0.0 0.0 - 0.4 K/UL    ABS.  BASOPHILS 0.0 0.0 - 0.06 K/UL    DF MANUAL      PLATELET COMMENTS ADEQUATE PLATELETS      RBC COMMENTS POLYCHROMASIA  1+       METABOLIC PANEL, BASIC    Collection Time: 07/21/20  7:45 AM   Result Value Ref Range    Sodium 132 (L) 136 - 145 mmol/L    Potassium 3.8 3.5 - 5.5 mmol/L    Chloride 100 100 - 111 mmol/L    CO2 25 21 - 32 mmol/L    Anion gap 7 3.0 - 18 mmol/L    Glucose 323 (H) 74 - 99 mg/dL    BUN 49 (H) 7.0 - 18 MG/DL    Creatinine 1.62 (H) 0.6 - 1.3 MG/DL    BUN/Creatinine ratio 30 (H) 12 - 20      GFR est AA 36 (L) >60 ml/min/1.73m2    GFR est non-AA 30 (L) >60 ml/min/1.73m2    Calcium 8.6 8.5 - 10.1 MG/DL   TROPONIN I Collection Time: 07/21/20  7:45 AM   Result Value Ref Range    Troponin-I, QT 0.20 (H) 0.0 - 0.045 NG/ML   PTT    Collection Time: 07/21/20  9:23 AM   Result Value Ref Range    aPTT >180.0 (HH) 23.0 - 36.4 SEC   PTT    Collection Time: 07/21/20  1:39 PM   Result Value Ref Range    aPTT BEING REDRAWN TO CONFIRM 23.0 - 36.4 SEC   NT-PRO BNP    Collection Time: 07/21/20  3:40 PM   Result Value Ref Range    NT pro-BNP 3,848 (H) 0 - 1,800 PG/ML   PTT    Collection Time: 07/21/20  3:40 PM   Result Value Ref Range    aPTT 24.9 23.0 - 36.4 SEC   SARS-COV-2    Collection Time: 07/21/20  5:17 PM   Result Value Ref Range    SARS-CoV-2 DUPLICATE REQUEST     Specimen source Nasopharyngeal      COVID-19 rapid test Detected (A) NOTD     GLUCOSE, POC    Collection Time: 07/21/20 11:19 PM   Result Value Ref Range    Glucose (POC) 168 (H) 70 - 110 mg/dL   PTT    Collection Time: 07/22/20  2:03 AM   Result Value Ref Range    aPTT 29.8 23.0 - 36.4 SEC   CBC WITH AUTOMATED DIFF    Collection Time: 07/22/20  2:03 AM   Result Value Ref Range    WBC 6.3 4.6 - 13.2 K/uL    RBC 4.30 4.20 - 5.30 M/uL    HGB 12.8 12.0 - 16.0 g/dL    HCT 38.4 35.0 - 45.0 %    MCV 89.3 74.0 - 97.0 FL    MCH 29.8 24.0 - 34.0 PG    MCHC 33.3 31.0 - 37.0 g/dL    RDW 14.2 11.6 - 14.5 %    PLATELET 230 596 - 189 K/uL    MPV 11.8 9.2 - 11.8 FL    NEUTROPHILS 81 (H) 42 - 75 %    LYMPHOCYTES 15 (L) 20 - 51 %    MONOCYTES 1 (L) 2 - 9 %    EOSINOPHILS 0 0 - 5 %    BASOPHILS 1 0 - 3 %    OTHER CELL 2 (H) 0      ABS. NEUTROPHILS 5.1 1.8 - 8.0 K/UL    ABS. LYMPHOCYTES 0.9 0.8 - 3.5 K/UL    ABS. MONOCYTES 0.1 0 - 1.0 K/UL    ABS. EOSINOPHILS 0.0 0.0 - 0.4 K/UL    ABS.  BASOPHILS 0.1 (H) 0.0 - 0.06 K/UL    DF MANUAL      PLATELET COMMENTS ADEQUATE PLATELETS      RBC COMMENTS NORMOCYTIC, NORMOCHROMIC     METABOLIC PANEL, BASIC    Collection Time: 07/22/20  2:03 AM   Result Value Ref Range    Sodium 139 136 - 145 mmol/L    Potassium 3.8 3.5 - 5.5 mmol/L    Chloride 107 100 - 111 mmol/L    CO2 24 21 - 32 mmol/L    Anion gap 8 3.0 - 18 mmol/L    Glucose 160 (H) 74 - 99 mg/dL    BUN 48 (H) 7.0 - 18 MG/DL    Creatinine 1.62 (H) 0.6 - 1.3 MG/DL    BUN/Creatinine ratio 30 (H) 12 - 20      GFR est AA 36 (L) >60 ml/min/1.73m2    GFR est non-AA 30 (L) >60 ml/min/1.73m2    Calcium 8.8 8.5 - 10.1 MG/DL   GLUCOSE, POC    Collection Time: 07/22/20  8:57 AM   Result Value Ref Range    Glucose (POC) 198 (H) 70 - 110 mg/dL         Intake/Output Summary (Last 24 hours) at 7/22/2020 1024  Last data filed at 7/22/2020 0409  Gross per 24 hour   Intake 565.53 ml   Output 0 ml   Net 565.53 ml       Cardiographics:       EKG Results     Procedure 720 Value Units Date/Time    EKG, 12 LEAD, INITIAL [[id number]] Collected:  07/19/20 2044    Order Status:  Completed Updated:  07/20/20 1713     Ventricular Rate 53 BPM      Atrial Rate 53 BPM      P-R Interval 174 ms      QRS Duration 78 ms      Q-T Interval 550 ms      QTC Calculation (Bezet) 516 ms      Calculated P Axis 55 degrees      Calculated R Axis -41 degrees      Calculated T Axis -71 degrees      Diagnosis --     Sinus bradycardia  Possible Left atrial enlargement  Left axis deviation  ST & T wave abnormality, consider inferior ischemia  ST & T wave abnormality, consider anterolateral ischemia  Prolonged QT  Abnormal ECG  When compared with ECG of 17-MAY-2015 02:56,  Significant changes have occurred  Confirmed by Adolfo Elias ([id number]) on 7/20/2020 5:13:16 PM                 Signed By: Steve PINTO Phone [phone]    July 22, 2020      I have independently evaluated and examined the patient. All relevant labs and testing data's are reviewed. Care plan discussed and updated after review.   Sanam White MD

## 2020-07-22 NOTE — PROGRESS NOTES
Problem: Falls - Risk of  Goal: *Absence of Falls  Description: Document [place] Fall Risk and appropriate interventions in the flowsheet.   Outcome: Progressing Towards Goal  Note: Fall Risk Interventions:       Mentation Interventions: Adequate sleep, hydration, pain control, Door open when patient unattended, Evaluate medications/consider consulting pharmacy, More frequent rounding, Reorient patient, Toileting rounds, Update white board, Room close to nurse's station    Medication Interventions: Evaluate medications/consider consulting pharmacy, Assess postural VS orthostatic hypotension, Patient to call before getting OOB

## 2020-07-22 NOTE — PROGRESS NOTES
Palliative Medicine      Consult received. Chart review in progress. Thank you for the Palliative Medicine consult and allowing us to participate in the care of Ms. Juan Jarvis. Will continue to monitor and provide support.        Gaye HECKN, RN, [facility]  Palliative Medicine Inpatient   DR. CONTE'[facility]       Palliative [place] Line: [phone] ([phone]

## 2020-07-23 NOTE — PROGRESS NOTES
Patient confused, has pulled out IV lines, oxygen tubing, and is trying to get out of bed. All attempts by the nursing staff to keep her in bed failed. The MD (Dr Louis Bamberger) was notified, and orders received.

## 2020-07-23 NOTE — CDMP QUERY
Patient admitted with COVID-19. Noted conflicting documentation regarding CHF. Dr Green Resides pn dated 7/19 states \"Patient with film findings consistent with CHF and Lasix will be administered\". However, Dr Esperanza Peres (cardiology) consult dated 7/22 states \"No CHF symptoms\". \"systolic CHF\" is documented on the ED summary report  .   If possible, please document in progress notes and discharge summary if you are evaluating and /or treating any of the following:     Systolic CHF confirmed (please clarify acuity)   CHF ruled out   Other, please specify   Clinically unable to determine    The medical record reflects the following:    Risk Factors: NSTEMI, HTN, COVID-19    Clinical Indicators: CXR (7/19): Extensive patchy multifocal airspace disease could be multifocal pneumonia although pulmonary edema is within the differential ; BNP 3,848    Treatment: receiving Norvasc, Cardura, Tenormin, Apresoline    Please call me for any questions  Thank you,  Nighat Luis [facility], [facility]  [phone]

## 2020-07-23 NOTE — PROGRESS NOTES
Reason for Admission:  NSTEMI (non-ST elevated myocardial infarction) ([facility] [address] [I21.4]                 RUR Score:    14%           Plan for utilizing home health: To be determined                    Likelihood of Readmission:   LOW                         Transition of Care Plan:              Initial assessment completed with Kolton Robins, daughter via phone Cognitive status of patient: Unable assess. Patient did not answer phone in room. Face sheet information confirmed:  yes. Tha Murdock ([phone]) provided any needed information. This patient lives in a single family home with daughter. Patient was ble to navigate steps as needed. Prior to hospitalization, patient was considered to be independent with ADLs/IADLS : yes . Patient has a current ACP document on file: no. Palliative consulted by MD.   The patient's daughter will be available to transport patient home upon discharge. The patient's daughter report no   medical equipment available in the home. Patient is not currently active with home health. Patient has not stayed in a skilled nursing facility or rehab. This patient is on dialysis :no      Freedom of choice signed: no     Currently, the discharge plan is Home with family assistance  Kolton Robins, daughter, stated \" My mother took care of herself. She owns the home. I live with her. \"  Palliative is following. CM will continue to monitor for transitional needs for a safe discharge. The patient states that she can obtain her medications from the pharmacy, and take her medications as directed. per daughter. Patient's current insurance is Medicare and  for Life per daughter.       Care Management Interventions  PCP Verified by CM: Yes(Per daughter, patient saw pcp 3 weeks ago. )  Mode of Transport at Discharge: Self  Transition of Care Consult (CM Consult): Discharge Planning  Discharge Durable Medical Equipment: No  Physical Therapy Consult: Yes  Occupational Therapy Consult: Yes  Speech Therapy Consult: No  Current Support Network: Own Home, Other(Daughter lives in home. )  Confirm Follow Up Transport: Family  Discharge Location  Discharge Placement: Home with family assistance      ALIDA Colon, RN  Pager # [phone]  Care Manager

## 2020-07-23 NOTE — ACP (ADVANCE CARE PLANNING)
[facility]: [phone]  [facility]: [phone]    Palliative Medicine consult for goals of care with team members Ron Strickland NP, Susie Jennings Np and this writer. This is a 80 y.o. female admitted for NSTEMI (non-ST elevated myocardial infarction) ([facility] [address] [I21.4]. Patient with PMHx of hypertension, MR, non sustained VT, diabetes. The [facility] with c/o abdominal pain and sleepiness. Patient rapid test COV-19 was positive. Pt does not have an Advance Directive on file in EMR and is not able to complete one currently. Legal Next of Kin would remain as the medical decision maker at this time since Ms. Montrell Srinivasan is confused. Pt is a  and has two living adult children. Health care decision making will fall to a consensus of Rudi Grijalva (daughter) and Matthew Gutierrez(son). (Health Care Agent): Rudi Grijalva     Relationship to patient:daughter    Phone number:  [phone] cell   [] Named in a scanned document   [x] Legal Next of Kin  [] Guardian     ([address] Agent):Tucker Srinivasan     Relationship to patient: son     Phone number:  [phone]  [] Named in a scanned document   [x] Legal Next of Kin  [] Guardian    Call placed to Matthew Ramirez, he requested we contact his sister Brittney Downeyer she is the caregiver and makes all the decisions. Call placed to Ms. Lauro Burciaga and her daughter (from [place], [place]). Both shared with palliative team that Ms. Montrell Srinivasan was an active person, caring for herself and gardening prior to this episode. Family is currently grieving the loss of 2 family members. Pt has lost her son and her nephew over the last 10 days. Compassionate listening and support provided. Goals of care was introduced. Family stated they have not had any of these types of conversations with Ms. Edgardo Reis.  The did share they believe in the carter and healing of God, but also are aware that quality of life is important. The benefits and burdens of CPR in a patient of MsAna Ames age was explained. Family appreciated the information and care being provided to the mother/grandmother. No change in code status currently. Palliative team will continue to follow.      Sally Watson RN, [facility]  Palliative Medicine Inpatient RN  DR. [facility]       Palliative [place] Line: [phone]

## 2020-07-23 NOTE — CONSULTS
Infectious Disease Consultation Note        Reason: SPGIB-29 pneumonia    Current abx Prior abx   Ceftriaxone, azithromycin since 7/21/20  Solumedrol since 7/22      Lines:       Assessment :    89 with a hx of HTN, MV disorder, hyperlipidemia, and DM type 2 who presented to the ED on 7/21/20 by her daughter because she was sleeping too much. Labs on 7/23-ferritin 656, CRP 8.6, procalcitonin 0.17, d-dimer 3.99    Positive rapid COVID-19 test 7/21/2020    CT chest 7/19, cxr 7/19- Extensive patchy multifocal airspace disease    Clinical presentation consistent with COVID-19 pneumonia (confirmed). Altered mental status on admission likely metabolic encephalopathy from COVID-19 infection    Elevated D-dimer concerning for hypercoagulable state secondary to COVID-19 infection. Recommendations:    1. Discontinue ceftriaxone after today's dose. Continue azithromycin  2. Recommend solumedrol  3. Continue therapeutic anticoagulation  4. Monitor inflammatory markers daily-CRP, ferritin, procalcitonin, d-dimer  5. Await palliative care recommendations to define goals of care. Thank you for consultation request. Above plan was discussed in details with RN and dr Camille Payton. Please call me if any further questions or concerns. Will continue to participate in the care of this patient. HPI:    80 with a hx of HTN, MV disorder, hyperlipidemia, and DM type 2 who presented to the ED on 7/21/20 by her daughter because she was sleeping too much. Most of the history is obtained from talking to hospitalist and review of [facility] care records as the patient is somewhat confused at the time of my evaluation. Per the patient's daughter the patient is generally not usually confused but felt she was not answering appropriately because she is not able to hear as well. The patient denied any shortness of breath cough chest pain fevers or chills. In the ED there was concern for non-ST elevation MI.   Cardiology was consulted. Patient was started on aspirin, statin, therapeutic anticoagulation. Rapid COVID-19 test 7/21/2020+. I have been consulted for further recommendations. Patient was confused at the time of my evaluation. She did not answer any questions. Detailed review of system not feasible. Past Medical History:   Diagnosis Date    Essential hypertension, benign     Mitral valve disorders(424.0)     Moderate MR    Other and unspecified hyperlipidemia     Other specified cardiac dysrhythmias(427.89)     Non-sustained VT on Holter in past    Type II or unspecified type diabetes mellitus without mention of complication, not stated as uncontrolled        History reviewed. No pertinent surgical history. home Medication List    Details   doxazosin (CARDURA) 8 mg tablet Take 8 mg by mouth nightly. indapamide (LOZOL) 1.25 mg tablet Take  by mouth daily. escitalopram oxalate (LEXAPRO) 10 mg tablet Take 10 mg by mouth daily. losartan (COZAAR) 100 mg tablet Take 100 mg by mouth daily. hydrALAZINE (APRESOLINE) 50 mg tablet Take 50 mg by mouth three (3) times daily. atorvastatin (LIPITOR) 40 mg tablet Take 1 Tab by mouth daily. Qty: 90 Tab, Refills: 3      ergocalciferol (ERGOCALCIFEROL) 50,000 unit capsule Take 50,000 Units by mouth every seven (7) days. atenolol (TENORMIN) 100 mg tablet Take 100 mg by mouth two (2) times a day. insulin glargine (LANTUS SOLOSTAR) 100 unit/mL (3 mL) pen 10 Units by SubCUTAneous route daily. insulin aspart (NOVOLOG) 100 unit/mL injection 6 Units by SubCUTAneous route Before breakfast, lunch, and dinner. aspirin 81 mg tablet Take 81 mg by mouth.      sitaGLIPtin (JANUVIA) 100 mg tablet Take 100 mg by mouth daily.              Current Facility-Administered Medications   Medication Dose Route Frequency    methylPREDNISolone (PF) (SOLU-MEDROL) injection 40 mg  40 mg IntraVENous Q12H    heparin 25,000 units in D5W 250 ml infusion  18-36 Units/kg/hr IntraVENous TITRATE    hydrALAZINE (APRESOLINE) tablet 50 mg  50 mg Oral TID    amLODIPine (NORVASC) tablet 5 mg  5 mg Oral DAILY    doxazosin (CARDURA) tablet 8 mg  8 mg Oral QHS    hydrALAZINE (APRESOLINE) 20 mg/mL injection 10 mg  10 mg IntraVENous Q6H PRN    aspirin chewable tablet 81 mg  81 mg Oral DAILY    atorvastatin (LIPITOR) tablet 40 mg  40 mg Oral DAILY    insulin glargine (LANTUS) injection 10 Units  10 Units SubCUTAneous QHS    atenoloL (TENORMIN) tablet 100 mg  100 mg Oral BID    insulin lispro (HUMALOG) injection   SubCUTAneous AC&HS    glucose chewable tablet 16 g  4 Tab Oral PRN    glucagon (GLUCAGEN) injection 1 mg  1 mg IntraMUSCular PRN    dextrose (D50W) injection syrg 12.5-25 g  25-50 mL IntraVENous PRN    zinc sulfate (ZINCATE) 220 (50) mg capsule 1 Cap  1 Cap Oral DAILY    cholecalciferol (VITAMIN D3) capsule 5,000 Units  5,000 Units Oral DAILY    ascorbic acid (vitamin C) (VITAMIN C) tablet 1,000 mg  1,000 mg Oral DAILY    melatonin tablet 3 mg  3 mg Oral QHS PRN    cefTRIAXone (ROCEPHIN) 1 g in sterile water (preservative free) 10 mL IV syringe  1 g IntraVENous Q24H    azithromycin (ZITHROMAX) 500 mg in  mL  500 mg IntraVENous Q24H    sodium chloride (NS) flush 5-40 mL  5-40 mL IntraVENous Q8H    sodium chloride (NS) flush 5-40 mL  5-40 mL IntraVENous PRN       Allergies: Minoxidil    Family History   Problem Relation Age of Onset    Heart Disease Other         Positive family history of ischemic heart disease.      Social History     Socioeconomic History    Marital status:      Spouse name: Not on file    Number of children: Not on file    Years of education: Not on file    Highest education level: Not on file   Occupational History    Not on file   Social Needs    Financial resource strain: Not on file    Food insecurity     Worry: Not on file     Inability: Not on file    Transportation needs     Medical: Not on file     Non-medical: Not on file   Tobacco Use    Smoking status: Never Smoker    Smokeless tobacco: Never Used   Substance and Sexual Activity    Alcohol use: No    Drug use: No    Sexual activity: Not on file   Lifestyle    Physical activity     Days per week: Not on file     Minutes per session: Not on file    Stress: Not on file   Relationships    Social connections     Talks on phone: Not on file     Gets together: Not on file     Attends Protestant service: Not on file     Active member of club or organization: Not on file     Attends meetings of clubs or organizations: Not on file     Relationship status: Not on file    Intimate partner violence     Fear of current or ex partner: Not on file     Emotionally abused: Not on file     Physically abused: Not on file     Forced sexual activity: Not on file   Other Topics Concern    Not on file   Social History Narrative    Not on file     Social History     Tobacco Use   Smoking Status Never Smoker   Smokeless Tobacco Never Used        Temp (24hrs), Av.9 °F (36.1 °C), Min:96.3 °F (35.7 °C), Max:97.4 °F (36.3 °C)    Visit Vitals  /59 (BP 1 Location: Left arm, BP Patient Position: At rest)   Pulse 77   Temp 97 °F (36.1 °C)   Resp 26   Ht 5' 6\" (1.676 m)   Wt 81.3 kg (179 lb 3.7 oz)   SpO2 96%   BMI 28.93 kg/m²       ROS: Unable to obtain  Physical Exam:    General:   awake alert. Not communicative. Skin:   no rashes or skin lesions noted on limited exam   HEENT:  Normocephalic, atraumatic, PERRL, EOMI, no scleral icterus or pallor; no conjunctival hemmohage;     Neck supple, no bruits. Lungs:   non-labored, bilaterally clear to aspiration- no crackles wheezes rales or rhonchi   Heart:  RRR, s1 and s2; 2/6 murmur; no  rubs or gallops, no edema, + pedal pulses   Abdomen:  soft, non-distended, active bowel sounds, no hepatomegaly, no splenomegaly. Non-tender   Genitourinary:  deferred   Extremities:   no clubbing, cyanosis; no joint effusions or swelling;  Full ROM of all large joints to the upper and lower extremities; muscle mass appropriate for age; no erythema of LE   Neurologic:  No gross focal motor or sensory abnormalities   Psychiatric:   unable to assess         Labs: Results:   Chemistry Recent Labs     07/23/20  0529 07/22/20  0203 07/21/20  0745   * 160* 323*    139 132*   K 4.2 3.8 3.8    107 100   CO2 20* 24 25   BUN 51* 48* 49*   CREA 1.72* 1.62* 1.62*   CA 9.2 8.8 8.6   AGAP 13 8 7   BUCR 30* 30* 30*   AP 64  --   --    TP 7.3  --   --    ALB 2.5*  --   --    GLOB 4.8*  --   --    AGRAT 0.5*  --   --       CBC w/Diff Recent Labs     07/23/20  0529 07/22/20 1928 07/22/20  0203   WBC 6.4 6.8 6.3   RBC 4.43 4.34 4.30   HGB 13.2 13.0 12.8   HCT 39.0 38.6 38.4    280 244   GRANS 81* 78* 81*   LYMPH 14* 13* 15*   EOS 0 0 0      Microbiology No results for input(s): CULT in the last 72 hours.        RADIOLOGY:    All available imaging studies/reports in [facility] for this admission were reviewed    Dr. Medardo Neumann, Infectious Disease Specialist  [phone]  July 23, 2020  10:28 AM

## 2020-07-23 NOTE — CONSULTS
[facility]: [phone] ([phone])  [facility]: [phone]   [facility]: [phone]    Patient Name: Alida Jo  YOB: 1929    Date of Initial Consult: 7/23/2020  Reason for Consult: Care decisions  Requesting Provider: Madiha Figueroa MD  Primary Care Physician: Vickie Simms MD      SUMMARY:   Alida Jo is a 80y.o. year old female with a past history of HTN, MV disorder, hyperlipidemia, and DM type 2 who was admitted on 7/19/2020 from home with a diagnosis of NSTEMI and COVID-19 Pneumonia. Current medical issues leading to Palliative Medicine involvement include: Care decisions. PALLIATIVE DIAGNOSES:   1.Goals of care  2. Non-ST elevation MI  3. Multifocal pneumonia  4. COVID-19 infection       PLAN:   1. Goals of care:      Saw patient today, she is awake, alert, moving all extremities spontaneously and to purpose. She is trying to remove medical equipments and monitors which are attached to her. She appears to be confused. She is not in any distress. Called Chelsey Dubon, son of patient when unable to contact daughter, Kolton Robins. Chelsey Dubon said to talk to his sister, Kolton Robins about patient. We were able to talk to Kolton Robins and patient's granddaughter today. They said that patient is very active and independent with her ADL prior to this admission. They denied noticing confusion or any other mental issues with patient. They said that patient is mentally alert and very capable of deciding for herself prior to admission. They also said patient is able to walk and goes to the grocery store. Granddaughter said patient and her family has not had any discussion about AMD and goals of care. Discussed the benefits and burdens of goals of care with the granddaughter and with Ms. Kolton Robins. Social: has multiple recent deaths in the family ( son, nephew); one of her son is sick and in the hospital, per granddaughter.     2. Non-ST elevation MI:      Patient is on telemetry monitor and on IV Heparin    3. Multifocal pneumonia:       Patient is on O2 at 4 liters via NC with O2 saturation above 90%. She has no shortness of breath. 4. COVID-19 infection:      Rapid COVID-19 test on 7/21/2020 was detected. TREATMENT PREFERENCES:   Code Status: Full Code    Advance Care Planning:  [] The [employer] [facility] Interdisciplinary Team has updated the ACP Navigator with [address] and Patient Capacity    Primary Decision Maker (Legal next of kin): Has no MPOA. Pt does not have an Advance Directive on file in EMR and is not able to complete one currently. Legal Next of Kin would remain as the medical decision maker at this time since Ms. Juan Jarvis is confused. Pt is a  and has two living adult children. Health care decision making will fall to a consensus of Sera Pelaez (daughter) and Afshan Gutierrez(son) Call placed to Debraabebe Consuelo, he requested we contact his sister Jacob Cheng she is the caregiver and makes all the decisions. Jose Vargas, daughter - home number: [phone], mobile number: [phone], e-mail: [email or web address]. Medical Interventions: Full interventions           Other:  As far as possible, the palliative care team has discussed with patient / health care proxy about goals of care / treatment preferences for patient. HISTORY:     History obtained from: Chart, Daughter, Granddaughter    CHIEF COMPLAINT: NSTEMI, COVID - 19 infection    HPI/SUBJECTIVE:    The patient is:   [] Verbal and participatory  [x] Non-participatory due to: confusion       Clinical Pain Assessment (nonverbal scale for nonverbal patients):   Patient doesn't appear to be having pain. She is focused on removing the medical equipments and monitors attached to her.      Activity (Movement): Restless, excessive activity and/or withdrawal reflexes    Duration: for how long has pt been experiencing pain (e.g., 2 days, 1 month, years)  Frequency: how often pain is an issue (e.g., several times per day, once every few days, constant)     FUNCTIONAL ASSESSMENT:     Palliative Performance Scale (PPS): 50%  PPS: 50    ECOG  ECOG Status : Limited self-care     PSYCHOSOCIAL/SPIRITUAL SCREENING:      Any spiritual / Nondenominational concerns: unable to assess  [] Yes /  [] No    Caregiver Burnout:  [] Yes /  [] No /  [x] No Caregiver Present      Anticipatory grief assessment: unable to assess  [] Normal  / [] Maladaptive        REVIEW OF SYSTEMS:     Positive and pertinent negative findings in ROS are noted above in HPI. The following systems were [x] reviewed / [] unable to be reviewed as noted in HPI  Other findings are noted below. Systems: constitutional, ears/nose/mouth/throat, respiratory, gastrointestinal, genitourinary, musculoskeletal, integumentary, neurologic, psychiatric, endocrine. Positive findings noted below. Modified ESAS Completed by: provider   Fatigue: 0 Drowsiness: 0   Depression: 0 Pain: 0   Anxiety: 3       Dyspnea: 0           Stool Occurrence(s): 0        PHYSICAL EXAM:     Wt Readings from Last 3 Encounters:   07/23/20 81.6 kg (180 lb)   01/26/17 82.6 kg (182 lb)   08/25/16 83 kg (183 lb)     Blood pressure 144/56, pulse 76, temperature 97.4 °F (36.3 °C), resp. rate 23, height 5' 6\" (1.676 m), weight 81.6 kg (180 lb), SpO2 96 %.   Pain:  Pain Scale 1: Visual  Pain Intensity 1: 0                 Last bowel movement: none recorded    Constitutional: awake, alert, no distress  Respiratory: breathing not labored, symmetric  Skin: warm, dry  Neurologic: awake, alert, moving all extremities  Psychiatric: full affect, confused         HISTORY:     Active Problems:    NSTEMI (non-ST elevated myocardial infarction) ([facility] [address] (7/20/2020)      PNA (pneumonia) (7/21/2020)      Past Medical History:   Diagnosis Date    Essential hypertension, benign     Mitral valve disorders(424.0)     Moderate MR    Other and unspecified hyperlipidemia     Other specified cardiac dysrhythmias(427.89)     Non-sustained VT on Holter in past    Type II or unspecified type diabetes mellitus without mention of complication, not stated as uncontrolled       History reviewed. No pertinent surgical history. Family History   Problem Relation Age of Onset    Heart Disease Other         Positive family history of ischemic heart disease. History reviewed, no pertinent family history.   Social History     Tobacco Use    Smoking status: Never Smoker    Smokeless tobacco: Never Used   Substance Use Topics    Alcohol use: No     Allergies   Allergen Reactions    Minoxidil Other (comments)     edema      Current Facility-Administered Medications   Medication Dose Route Frequency    atenoloL (TENORMIN) tablet 50 mg  50 mg Oral BID    [START ON 7/24/2020] amLODIPine (NORVASC) tablet 10 mg  10 mg Oral DAILY    methylPREDNISolone (PF) (SOLU-MEDROL) injection 40 mg  40 mg IntraVENous Q12H    heparin 25,000 units in D5W 250 ml infusion  18-36 Units/kg/hr IntraVENous TITRATE    hydrALAZINE (APRESOLINE) tablet 50 mg  50 mg Oral TID    doxazosin (CARDURA) tablet 8 mg  8 mg Oral QHS    hydrALAZINE (APRESOLINE) 20 mg/mL injection 10 mg  10 mg IntraVENous Q6H PRN    aspirin chewable tablet 81 mg  81 mg Oral DAILY    atorvastatin (LIPITOR) tablet 40 mg  40 mg Oral DAILY    insulin glargine (LANTUS) injection 10 Units  10 Units SubCUTAneous QHS    insulin lispro (HUMALOG) injection   SubCUTAneous AC&HS    glucose chewable tablet 16 g  4 Tab Oral PRN    glucagon (GLUCAGEN) injection 1 mg  1 mg IntraMUSCular PRN    dextrose (D50W) injection syrg 12.5-25 g  25-50 mL IntraVENous PRN    zinc sulfate (ZINCATE) 220 (50) mg capsule 1 Cap  1 Cap Oral DAILY    cholecalciferol (VITAMIN D3) capsule 5,000 Units  5,000 Units Oral DAILY    ascorbic acid (vitamin C) (VITAMIN C) tablet 1,000 mg  1,000 mg Oral DAILY    melatonin tablet 3 mg  3 mg Oral QHS PRN    cefTRIAXone (ROCEPHIN) 1 g in sterile water (preservative free) 10 mL IV syringe  1 g IntraVENous Q24H    azithromycin (ZITHROMAX) 500 mg in  mL  500 mg IntraVENous Q24H    sodium chloride (NS) flush 5-40 mL  5-40 mL IntraVENous Q8H    sodium chloride (NS) flush 5-40 mL  5-40 mL IntraVENous PRN        LAB AND IMAGING FINDINGS:     Lab Results   Component Value Date/Time    WBC 6.4 07/23/2020 05:29 AM    HGB 13.2 07/23/2020 05:29 AM    PLATELET 594 87/12/6637 05:29 AM     Lab Results   Component Value Date/Time    Sodium 142 07/23/2020 05:29 AM    Potassium 4.2 07/23/2020 05:29 AM    Chloride 109 07/23/2020 05:29 AM    CO2 20 (L) 07/23/2020 05:29 AM    BUN 51 (H) 07/23/2020 05:29 AM    Creatinine 1.72 (H) 07/23/2020 05:29 AM    Calcium 9.2 07/23/2020 05:29 AM    Magnesium 2.1 07/23/2020 05:29 AM      Lab Results   Component Value Date/Time    Alk. phosphatase 64 07/23/2020 05:29 AM    Protein, total 7.3 07/23/2020 05:29 AM    Albumin 2.5 (L) 07/23/2020 05:29 AM    Globulin 4.8 (H) 07/23/2020 05:29 AM     Lab Results   Component Value Date/Time    INR 1.4 (H) 07/23/2020 05:29 AM    Prothrombin time 16.8 (H) 07/23/2020 05:29 AM    aPTT >180.0 (HH) 07/23/2020 05:29 AM      Lab Results   Component Value Date/Time    Ferritin 656 (H) 07/23/2020 05:29 AM      No results found for: PH, PCO2, PO2  No components found for: [place]   Lab Results   Component Value Date/Time     07/21/2020 02:03 AM    CK - MB 4.6 (H) 07/19/2020 08:10 PM              Total time: 30 minutes  Counseling / coordination time, spent as noted above: 20 minutes  > 50% counseling / coordination: Yes daughter and granddaughter    Prolonged service was provided for  []30 min   []75 min in face to face time in the presence of the patient, spent as noted above. Time Start:   Time End:   Note: this can only be billed with 95130 (initial) or 55253 (follow up). If multiple start / stop times, list each separately.

## 2020-07-23 NOTE — PROGRESS NOTES
[facility]      CARDIOLOGY PROGRESS NOTE  RECS:        1. NSTEMI- positive troponin. Now down trending. ekg with ST-T wave abnormality in inferior and lateral leads. Continue medical management. Asa.statin. reduced Tenormin. Intermittent bradycardia overnight      2. COV-19 - rapid test 7/21/20 detected- being managed by medical team   3. Hypertension-  Elevated increased Norvasc  Patient on multiple medications for BP control. Will monitor and adjust medications as needed. 4. Hx of nonsustained ventricular tachycardia- no recurrence on this admission will monitor on telemetry           5. Hyperlipidemia- continue statin        6. Type 2 diabetes mellitus without complication- medication per medical team                                    7. Hx of Mitral valve disorder-mild on echo 2015  8. AMS- in the setting of above- on restrains     Agree with decreasing atenolol dose. Continue supportive care    ASSESSMENT:  Hospital Problems  Date Reviewed: 1/26/2017          Codes Class Noted POA    PNA (pneumonia) ICD-10-CM: J18.9  ICD-9-CM: 860  7/21/2020 Unknown        NSTEMI (non-ST elevated myocardial infarction) [facility]) ICD-10-CM: I21.4  ICD-9-CM: 410.70  7/20/2020 Unknown                SUBJECTIVE:  Confused per nursing staff   OBJECTIVE:    VS:   Visit Vitals  /58   Pulse 77   Temp 97 °F (36.1 °C)   Resp 26   Ht 5' 6\" (1.676 m)   Wt 81.6 kg (180 lb)   SpO2 96%   BMI 29.05 kg/m²       No intake or output data in the 24 hours ending 07/23/20 1147     TELE: NSR/ Sinus bradycardia     Limited physical exam to preserve PPE. Spoke with nursing staff patient is confused on restrains.       PULM:  not using accessory muscles on O2 by NC  Heart  NSR   EXT: no edema visible   SKIN: no acute rash on limited visible skin exam  NEURO: awake, moving all extremities on restrains      Labs: Results:       Chemistry Recent Labs     07/23/20  0529 07/22/20  0203 07/21/20  0745   * 160* 323*   NA 142 139 132*   K 4.2 3.8 3.8    107 100   CO2 20* 24 25   BUN 51* 48* 49*   CREA 1.72* 1.62* 1.62*   CA 9.2 8.8 8.6   AGAP 13 8 7   BUCR 30* 30* 30*   AP 64  --   --    TP 7.3  --   --    ALB 2.5*  --   --    GLOB 4.8*  --   --    AGRAT 0.5*  --   --       CBC w/Diff Recent Labs     07/23/20  0529 07/22/20 1928 07/22/20  0203   WBC 6.4 6.8 6.3   RBC 4.43 4.34 4.30   HGB 13.2 13.0 12.8   HCT 39.0 38.6 38.4    280 244   GRANS 81* 78* 81*   LYMPH 14* 13* 15*   EOS 0 0 0      Cardiac Enzymes Recent Labs     07/21/20  0203         Coagulation Recent Labs     07/23/20 0529 07/22/20 1928   PTP 16.8*  --    INR 1.4*  --    APTT >180.0* 24.6       Lipid Panel Lab Results   Component Value Date/Time    Cholesterol, total 108 07/22/2020 11:41 AM    HDL Cholesterol 34 (L) 07/22/2020 11:41 AM    LDL, calculated 43.8 07/22/2020 11:41 AM    VLDL, calculated 30.2 07/22/2020 11:41 AM    Triglyceride 151 (H) 07/22/2020 11:41 AM    CHOL/HDL Ratio 3.2 07/22/2020 11:41 AM      BNP No results for input(s): BNPP in the last 72 hours. Liver Enzymes Recent Labs     07/23/20  0529   TP 7.3   ALB 2.5*   AP 64      Thyroid Studies No results found for: T4, T3U, TSH, TSHEXT           Cheyenne Hardin NP-C Zofia:[phone] supervised    I have independently evaluated and examined the patient. All relevant labs and testing data's are reviewed. Care plan discussed and updated after review.     Edwin Melvin MD

## 2020-07-23 NOTE — ROUTINE PROCESS
Results for No Richard (MRN [id number]) as of 7/23/2020 19:39   Ref.  Range 7/23/2020 17:11   aPTT Latest Ref Range: 23.0 - 36.4 SEC >180.0 ()

## 2020-07-23 NOTE — PROGRESS NOTES
[facility] Hospitalist Group  Progress Note    Patient: Gene Jones Age: 80 y.o. : 2/15/1929 MR#: [id number] SSN: [id number]  Date: 2020    Subjective/24-hour events:     Sleeping currently but agitated at times. Assessment:   COVID-19 infection with COVID-19 pneumonia  NSTEMI  DM2  Hypertension  Hypoxia  Abnormal cognition, suspect underlying dementia  Advanced age    Plan:  IV heparin per protocol. Aspirin/statin/beta-blocker. Rocephin and azithromycin as ordered. IV Solu-Medrol with taper/transition to p.o. when appropriate. C/D insulin supplementation. Discontinue scheduled hydralazine, change to PRN. Follow BPs and adjust meds further as necessary. Diabetes nursing following. Lantus and SSI as ordered. Adjust as necessary and attempt to optimize glycemic control. Palliative care evaluation. Await outcome of family meeting/goals of care discussion. Assistance appreciated. Case discussed with:  []Patient  []Family  [x]Nursing  [x]Case Management  DVT Prophylaxis:  []Lovenox  []Hep SQ  []SCDs  []Coumadin   [x]On Heparin gtt    Objective:   VS:   Visit Vitals  /58   Pulse 77   Temp 97 °F (36.1 °C)   Resp 26   Ht 5' 6\" (1.676 m)   Wt 81.6 kg (180 lb)   SpO2 96%   BMI 29.05 kg/m²      Tmax/24hrs: Temp (24hrs), Av °F (36.1 °C), Min:96.3 °F (35.7 °C), Max:97.4 °F (36.3 °C)  No intake or output data in the 24 hours ending 20 1147    General: Nontoxic appearing. Cardiovascular: RRR  Pulmonary: Accessory muscle use. GI:  Abdomen soft, nontender. Extremities: Warm, no edema or ischemia. Neuro: Awake and alert but confused.     Labs:    Recent Results (from the past 24 hour(s))   GLUCOSE, POC    Collection Time: 20 11:50 AM   Result Value Ref Range    Glucose (POC) 167 (H) 70 - 110 mg/dL   GLUCOSE, POC    Collection Time: 20  4:38 PM   Result Value Ref Range    Glucose (POC) 160 (H) 70 - 110 mg/dL   CBC WITH AUTOMATED DIFF    Collection Time: 07/22/20  7:28 PM   Result Value Ref Range    WBC 6.8 4.6 - 13.2 K/uL    RBC 4.34 4.20 - 5.30 M/uL    HGB 13.0 12.0 - 16.0 g/dL    HCT 38.6 35.0 - 45.0 %    MCV 88.9 74.0 - 97.0 FL    MCH 30.0 24.0 - 34.0 PG    MCHC 33.7 31.0 - 37.0 g/dL    RDW 14.2 11.6 - 14.5 %    PLATELET 196 005 - 270 K/uL    MPV 11.8 9.2 - 11.8 FL    NEUTROPHILS 78 (H) 40 - 73 %    LYMPHOCYTES 13 (L) 21 - 52 %    MONOCYTES 8 3 - 10 %    EOSINOPHILS 0 0 - 5 %    BASOPHILS 1 0 - 2 %    ABS. NEUTROPHILS 5.3 1.8 - 8.0 K/UL    ABS. LYMPHOCYTES 0.9 0.9 - 3.6 K/UL    ABS. MONOCYTES 0.5 0.05 - 1.2 K/UL    ABS. EOSINOPHILS 0.0 0.0 - 0.4 K/UL    ABS. BASOPHILS 0.1 0.0 - 0.1 K/UL    DF AUTOMATED     PTT    Collection Time: 07/22/20  7:28 PM   Result Value Ref Range    aPTT 24.6 23.0 - 36.4 SEC   GLUCOSE, POC    Collection Time: 07/22/20  9:18 PM   Result Value Ref Range    Glucose (POC) 163 (H) 70 - 110 mg/dL   PTT    Collection Time: 07/23/20  5:29 AM   Result Value Ref Range    aPTT >180.0 (HH) 23.0 - 36.4 SEC   CBC WITH AUTOMATED DIFF    Collection Time: 07/23/20  5:29 AM   Result Value Ref Range    WBC 6.4 4.6 - 13.2 K/uL    RBC 4.43 4.20 - 5.30 M/uL    HGB 13.2 12.0 - 16.0 g/dL    HCT 39.0 35.0 - 45.0 %    MCV 88.0 74.0 - 97.0 FL    MCH 29.8 24.0 - 34.0 PG    MCHC 33.8 31.0 - 37.0 g/dL    RDW 14.2 11.6 - 14.5 %    PLATELET 145 734 - 299 K/uL    MPV 11.9 (H) 9.2 - 11.8 FL    NEUTROPHILS 81 (H) 42 - 75 %    BAND NEUTROPHILS 3 0 - 5 %    LYMPHOCYTES 14 (L) 20 - 51 %    MONOCYTES 2 2 - 9 %    EOSINOPHILS 0 0 - 5 %    BASOPHILS 0 0 - 3 %    ABS. NEUTROPHILS 5.4 1.8 - 8.0 K/UL    ABS. LYMPHOCYTES 0.9 0.8 - 3.5 K/UL    ABS. MONOCYTES 0.1 0 - 1.0 K/UL    ABS. EOSINOPHILS 0.0 0.0 - 0.4 K/UL    ABS.  BASOPHILS 0.0 0.0 - 0.06 K/UL    DF AUTOMATED      PLATELET COMMENTS ADEQUATE PLATELETS      RBC COMMENTS NORMOCYTIC, NORMOCHROMIC     METABOLIC PANEL, COMPREHENSIVE    Collection Time: 07/23/20  5:29 AM   Result Value Ref Range    Sodium 142 136 - 145 mmol/L Potassium 4.2 3.5 - 5.5 mmol/L    Chloride 109 100 - 111 mmol/L    CO2 20 (L) 21 - 32 mmol/L    Anion gap 13 3.0 - 18 mmol/L    Glucose 160 (H) 74 - 99 mg/dL    BUN 51 (H) 7.0 - 18 MG/DL    Creatinine 1.72 (H) 0.6 - 1.3 MG/DL    BUN/Creatinine ratio 30 (H) 12 - 20      GFR est AA 34 (L) >60 ml/min/1.73m2    GFR est non-AA 28 (L) >60 ml/min/1.73m2    Calcium 9.2 8.5 - 10.1 MG/DL    Bilirubin, total 0.9 0.2 - 1.0 MG/DL    ALT (SGPT) 37 13 - 56 U/L    AST (SGOT) 52 (H) 10 - 38 U/L    Alk.  phosphatase 64 45 - 117 U/L    Protein, total 7.3 6.4 - 8.2 g/dL    Albumin 2.5 (L) 3.4 - 5.0 g/dL    Globulin 4.8 (H) 2.0 - 4.0 g/dL    A-G Ratio 0.5 (L) 0.8 - 1.7     MAGNESIUM    Collection Time: 07/23/20  5:29 AM   Result Value Ref Range    Magnesium 2.1 1.6 - 2.6 mg/dL   PROTHROMBIN TIME + INR    Collection Time: 07/23/20  5:29 AM   Result Value Ref Range    Prothrombin time 16.8 (H) 11.5 - 15.2 sec    INR 1.4 (H) 0.8 - 1.2     FERRITIN    Collection Time: 07/23/20  5:29 AM   Result Value Ref Range    Ferritin 656 (H) 8 - 388 NG/ML   LD    Collection Time: 07/23/20  5:29 AM   Result Value Ref Range     (H) 81 - 234 U/L   C REACTIVE PROTEIN, QT    Collection Time: 07/23/20  5:29 AM   Result Value Ref Range    C-Reactive protein 8.6 (H) 0 - 0.3 mg/dL   D DIMER    Collection Time: 07/23/20  5:29 AM   Result Value Ref Range    D DIMER 3.99 (H) <0.46 ug/ml(FEU)   PROCALCITONIN    Collection Time: 07/23/20  5:29 AM   Result Value Ref Range    Procalcitonin 0.17 ng/mL   GLUCOSE, POC    Collection Time: 07/23/20  8:56 AM   Result Value Ref Range    Glucose (POC) 240 (H) 70 - 110 mg/dL       Signed By: Frankey Dirk, MD     July 23, 2020

## 2020-07-23 NOTE — ROUTINE PROCESS
Bedside shift change report given to Gwen Baird RN (oncoming nurse) by Farzana Nicholson RN (offgoing nurse). Report included the following information SBAR, Kardex, Intake/Output and MAR.

## 2020-07-23 NOTE — PROGRESS NOTES
Problem: Dysphagia (Adult)  Goal: *Acute Goals and Plan of Care (Insert Text)  Description: Patient will:  1. Tolerate PO trials with 0 s/s overt distress in 4/5 trials  2. Participate in training and education related to continued aspiration risk, diet recs and compensatory strategies     Recommend:   Regular diet with thin liquids  Meds as tolerated  Feeding assistance as needed   Aspiration precautions  HOB >45 degrees during all intake and for at least 30 min after po   Small bites/sips, Slow rate of intake, Alternating bites/sips   Oral care three times daily     Outcome: Progressing Towards Goal    SPEECH LANGUAGE PATHOLOGY BEDSIDE SWALLOW EVALUATION/TREATMENT    Patient: Mariam Escalante (80 y.o. female)  Date: 7/23/2020  Primary Diagnosis: NSTEMI (non-ST elevated myocardial infarction) ([facility] [address] [I21.4]  Precautions: Aspiration, Skin, Fall, Contact, Other (comment)(droplet plus)  PLOF: As per H&P    ASSESSMENT :  Based on the objective data described below, the patient presents with mild oral and suspected mild pharyngeal dysphagia. Pt alert but confused, attempting to get out bed (currently restrained). Oral mech exam revealed mildly decreased orolingual strength/control. Pt demo prolonged mastication of solids secondary to inattention to task/confusion. Pt with mild lingual spread post swallow with regular solids; able to clear given liquid wash + straw. Pt with timely swallow initiation and adequate laryngeal elevation to palpation. Recommend continue regular diet with thin liquids with meds as tolerated. TREATMENT :  Skilled therapy initiated; attempted training and education regarding diet recs, aspiration risk/precautions, oral care and positioning; however, pt unable to participate due to cognitive status. Clearing regular solids with liquid wash on 4/5 trials. D/w RN. Will follow x1-2 visits to ensure diet tolerance.       Patient will benefit from skilled intervention to address the above impairments. Patient's rehabilitation potential is considered to be Fair  Factors which may influence rehabilitation potential include:   []            None noted  [x]            Mental ability/status  [x]            Medical condition  []            Home/family situation and support systems  [x]            Safety awareness  []            Pain tolerance/management  []            Other:      PLAN :  Recommendations and Planned Interventions:  As above   Frequency/Duration: Patient will be followed by speech-language pathology x1-2 visits to address goals. Discharge Recommendations: To Be Determined     SUBJECTIVE:   Patient stated No more of that. OBJECTIVE:     Past Medical History:   Diagnosis Date    Essential hypertension, benign     Mitral valve disorders(424.0)     Moderate MR    Other and unspecified hyperlipidemia     Other specified cardiac dysrhythmias(427.89)     Non-sustained VT on Holter in past    Type II or unspecified type diabetes mellitus without mention of complication, not stated as uncontrolled    History reviewed. No pertinent surgical history. Diet prior to admission: Unknown   Current Diet:  Regular diet with thin liquids    Cognitive and Communication Status:  Neurologic State: Alert  Orientation Level: Oriented to person, Disoriented to place  Cognition: Decreased attention/concentration  Safety/Judgement: Decreased insight into deficits, Decreased awareness of need for assistance  Oral Assessment:  Oral Assessment  Labial: No impairment  Dentition: Intact  Oral Hygiene: Good  Lingual: No impairment  Velum: No impairment  Mandible: No impairment  P.O. Trials:  Patient Position: [facility] at [facility]  Vocal quality prior to P.O.: Low volume  Consistency Presented: Thin liquid; Solid;Mechanical soft  How Presented: SLP-fed/presented;Straw;Successive swallows;Spoon  Bolus Acceptance: No impairment  Bolus Formation/Control: Impaired  Type of Impairment: Mastication;Delayed  Propulsion: Delayed (# of seconds)  Oral Residue: Less than 10% of bolus; Lingual  Initiation of Swallow: No impairment  Laryngeal Elevation: Functional  Aspiration Signs/Symptoms: None  Pharyngeal Phase Characteristics: Poor endurance  Effective Modifications: Small sips and bites; Alternate liquids/solids  Cues for Modifications: Maximal  Oral Phase Severity: Mild  Pharyngeal Phase Severity : Mild    PAIN:  Start of Eval: unable to report secondary to cognitive status   End of Eval: unable to report secondary to cognitive status      After treatment:   []            Patient left in no apparent distress sitting up in chair  [x]            Patient left in no apparent distress in bed  [x]            Call bell left within reach  [x]            Nursing notified  []            Family present  []            Caregiver present  []            Bed alarm activated    COMMUNICATION/EDUCATION:   [x]            Aspiration precautions; swallow safety; compensatory techniques. []            Patient/family have participated as able in goal setting and plan of care. []            Patient/family agree to work toward stated goals and plan of care. []            Patient understands intent and goals of therapy; neutral about participation. [x]            Patient unable to participate in goal setting/plan of care; educ ongoing with interdisciplinary staff  [x]           Posted safety precautions in patient's room.     Thank you for this referral,  Thelma Rodriguez M.S., [id number] [facility]  Speech-Language Pathologist

## 2020-07-23 NOTE — DIABETES MGMT
GLYCEMIC CONTROL:    Lab Results   Component Value Date/Time    Hemoglobin A1c 6.9 (H) 07/21/2020 02:03 AM      Lab Results   Component Value Date/Time    Glucose 160 (H) 07/23/2020 05:29 AM       Noted report COVID detected. 80 y.o. patient with history of type 2 diabetes mellitus. Diabetes medications: Unverified. Attempted to contact patient in room but no answer. Attempted to contact her daughter, Sallie Alcantar, ph: [phone], but no answer at this time. SoloStar (lantus) pen insulin 10 units daily  Novolog meal time insulin 6 units TID AC  Januvia 100 mg daily    Noted:  NSTEMI  COVID-19  T2DM. A1c 6.9% (7/21/2020)      Diet: cardiac regular; added consistent carb today. Recommendation: cont current insulin orders: lantus and sliding scale lispro.     Jacqueline Goff RN [facility]  Pager: [phone]

## 2020-07-23 NOTE — PROGRESS NOTES
Problem: Falls - Risk of  Goal: *Absence of Falls  Description: Document [facility] Fall Risk and appropriate interventions in the flowsheet. Outcome: Progressing Towards Goal  Note: Fall Risk Interventions:  Mobility Interventions: Bed/chair exit alarm, Patient to call before getting OOB    Mentation Interventions: Bed/chair exit alarm, More frequent rounding    Medication Interventions: Bed/chair exit alarm, Evaluate medications/consider consulting pharmacy    Elimination Interventions: Call light in reach, Bed/chair exit alarm              Problem: Patient Education: Go to Patient Education Activity  Goal: Patient/Family Education  Outcome: Progressing Towards Goal     Problem: Diabetes Self-Management  Goal: *Disease process and treatment process  Description: Define diabetes and identify own type of diabetes; list 3 options for treating diabetes. Outcome: Progressing Towards Goal  Goal: *Incorporating nutritional management into lifestyle  Description: Describe effect of type, amount and timing of food on blood glucose; list 3 methods for planning meals. Outcome: Progressing Towards Goal  Goal: *Incorporating physical activity into lifestyle  Description: State effect of exercise on blood glucose levels. Outcome: Progressing Towards Goal  Goal: *Developing strategies to promote health/change behavior  Description: Define the ABC's of diabetes; identify appropriate screenings, schedule and personal plan for screenings. Outcome: Progressing Towards Goal  Goal: *Using medications safely  Description: State effect of diabetes medications on diabetes; name diabetes medication taking, action and side effects. Outcome: Progressing Towards Goal  Goal: *Monitoring blood glucose, interpreting and using results  Description: Identify recommended blood glucose targets  and personal targets.   Outcome: Progressing Towards Goal  Goal: *Prevention, detection, treatment of acute complications  Description: List symptoms of hyper- and hypoglycemia; describe how to treat low blood sugar and actions for lowering  high blood glucose level. Outcome: Progressing Towards Goal  Goal: *Prevention, detection and treatment of chronic complications  Description: Define the natural course of diabetes and describe the relationship of blood glucose levels to long term complications of diabetes.   Outcome: Progressing Towards Goal  Goal: *Developing strategies to address psychosocial issues  Description: Describe feelings about living with diabetes; identify support needed and support network  Outcome: Progressing Towards Goal  Goal: *Insulin pump training  Outcome: Progressing Towards Goal  Goal: *Sick day guidelines  Outcome: Progressing Towards Goal  Goal: *Patient Specific Goal (EDIT GOAL, INSERT TEXT)  Outcome: Progressing Towards Goal

## 2020-07-23 NOTE — PROGRESS NOTES
Problem: Mobility Impaired (Adult and Pediatric)  Goal: *Acute Goals and Plan of Care (Insert Text)  Description: Physical Therapy Goals  Initiated 7/23/2020 and to be accomplished within 7 day(s)  1. Patient will move from supine to sit and sit to supine  in bed with supervision/set-up. 2.  Patient will transfer from bed to chair and chair to bed with supervision/set-up using the least restrictive device. 3.  Patient will perform sit to stand with supervision/set-up. 4.  Patient will ambulate with supervision/set-up for 50 feet with the least restrictive device. PLOF: Unknown. Per care management note, patient lives with her daughter and was independent with ADL's. Outcome: Progressing Towards Goal     PHYSICAL THERAPY EVALUATION    Patient: Selam Zhong (77 y.o. female)  Date: 7/23/2020  Primary Diagnosis: NSTEMI (non-ST elevated myocardial infarction) [facility]) [I21.4]        Precautions:   Aspiration, Skin, Fall, Contact, Other (comment)(droplet plus)      ASSESSMENT :  Based on the objective data described below, the patient presents with poor command following, impaired bed mobility, and decreased safety awareness with functional mobility. Patient is a pleasantly confused female received with UE restraints donned in bed. Patient is able to state her first name, not oriented to place or time. She has poor command following, was able to lift LE's in bed when asked. Patient requires moderate/maximum assistance for bed mobility, limited by decreased command following and impaired cognition. Sitting EOB she demonstrates good static balance. Two unsuccessful sit to stand trials attempted with total assistance x2. She requires max cues to return supine to bed. Patient will be placed on 3 day trial to assess progress towards goal and carry over of learning. At end of session, wrist restraints re applied. Patient will benefit from skilled intervention to address the above impairments.   Patient's rehabilitation potential is considered to be Fair  Factors which may influence rehabilitation potential include:   []         None noted  [x]         Mental ability/status  [x]         Medical condition  []         Home/family situation and support systems  [x]         Safety awareness  []         Pain tolerance/management  []         Other:      PLAN :  Recommendations and Planned Interventions:   [x]           Bed Mobility Training             [x]    Neuromuscular Re-Education  [x]           Transfer Training                   []    Orthotic/Prosthetic Training  [x]           Gait Training                          []    Modalities  [x]           Therapeutic Exercises           []    Edema Management/Control  [x]           Therapeutic Activities            []    Family Training/Education  [x]           Patient Education  []           Other (comment):    Frequency/Duration: Patient will be followed by physical therapy 1-2 times per day/4-7 days per week to address goals. Discharge Recommendations: [facility] vs [facility] with 24h assistance  Further Equipment Recommendations for Discharge: N/A     SUBJECTIVE:   Patient stated Where's my glasses .     OBJECTIVE DATA SUMMARY:     Past Medical History:   Diagnosis Date    Essential hypertension, benign     Mitral valve disorders(424.0)     Moderate MR    Other and unspecified hyperlipidemia     Other specified cardiac dysrhythmias(427.89)     Non-sustained VT on Holter in past    Type II or unspecified type diabetes mellitus without mention of complication, not stated as uncontrolled    History reviewed. No pertinent surgical history.   Barriers to Learning/Limitations: yes;  cognitive and altered mental status (i.e.Sedation, Confusion)  Compensate with: Visual Cues, Verbal Cues, Tactile Cues, and Kinesthetic Cues  Home Situation:     Critical Behavior:  Neurologic State: Alert;Drowsy  Orientation Level: Oriented to person  Cognition: Decreased command following  Safety/Judgement: Fall prevention       Range Of Motion:  AROM: Within functional limits        Functional Mobility:  Bed Mobility:     Supine to Sit: Moderate assistance  Sit to Supine: Maximum assistance;Assist x2  Scooting: Maximum assistance  Transfers:  Sit to Stand: Total assistance;Assist x2     Balance:   Sitting: Intact      Pain:  Pain level pre-treatment: -/10   Pain level post-treatment: -/10   Pain Intervention(s) : Medication (see MAR); Rest, Ice, Repositioning  Response to intervention: Nurse notified, See doc flow    Activity Tolerance:   Fair  Please refer to the flowsheet for vital signs taken during this treatment. After treatment:   []         Patient left in no apparent distress sitting up in chair  [x]         Patient left in no apparent distress in bed  [x]         Call bell left within reach  [x]         Nursing notified  []         Caregiver present  [x]         Bed alarm activated  [x]         Wrist restraints re-applied    COMMUNICATION/EDUCATION:   [x]         Role of Physical Therapy in the acute care setting. [x]         Fall prevention education was provided and the patient/caregiver indicated understanding. []         Patient/family have participated as able in goal setting and plan of care. []         Patient/family agree to work toward stated goals and plan of care. []         Patient understands intent and goals of therapy, but is neutral about his/her participation. []         Patient is unable to participate in goal setting/plan of care: ongoing with therapy staff.  []         Other:     Thank you for this referral.  Dino Carpio, PT   Time Calculation: 27 mins      Eval Complexity: History: MEDIUM  Complexity : 1-2 comorbidities / personal factors will impact the outcome/ POC Exam:MEDIUM Complexity : 3 Standardized tests and measures addressing body structure, function, activity limitation and / or participation in recreation  Presentation: MEDIUM Complexity : Evolving with changing characteristics  Clinical Decision Making:Medium Complexity    Overall Complexity:MEDIUM

## 2020-07-23 NOTE — PROGRESS NOTES
Problem: Self Care Deficits Care Plan (Adult)  Goal: *Acute Goals and Plan of Care (Insert Text)  Description: Occupational Therapy Goals  Initiated 7/23/2020 within 7 day(s). 1.  Patient will perform self-feeding with modified independence. 2.  Patient will perform grooming with modified independence. 3.  Patient will perform upper body dressing with minimal assistance/contact guard assist.  4.  Patient will perform toilet transfers with contact guard assist .  5. Patient will perform all aspects of toileting with contact guard assist.  6.  Patient will participate in upper extremity therapeutic exercise/activities with supervision/set-up for 8 minutes. Prior Level of Function: Pt is a poor historian, unable to provide PLOF. Per chart review, pt was (I) with basic self-care/ADLs PTA  Outcome: Progressing Towards Goal   OCCUPATIONAL THERAPY EVALUATION    Patient: Misty Anaya (67 y.o. female)  Date: 7/23/2020  Primary Diagnosis: NSTEMI (non-ST elevated myocardial infarction) ([facility] [address] [I21.4]        Precautions:  Aspiration, Skin, Fall, Contact, Other (comment)(droplet plus)    ASSESSMENT :  Pt cleared to participate in OT evaluation by RN. Upon entering room, pt received semi-reclined in bed, alert, and agreeable to OT eval.  Based on the objective data described below, the patient presents with  decreased activity tolerance, decreased ability to safely perform functional transfers and mobility, impaired cognition, and decreased following of commands, affecting the patients safety and ability to perform basic ADLs. Pt only oriented to name, unable to state birthday, nor place, or time. B soft wrist restraints doffed. Pt requiring moderate assist for sup>sit to participate in further self-care. Pt requiring max assist to don B socks. Attempted to stand pt three times this session in prep for toilet transfers. Multiple verbal/tactile/visual cues provided, however pt unable lift buttocks from bed.  Pt requiring max assist to wash b hands using sani-wipes, however pt was able to wipe mouth with hospital gown with supervision. Pt was returned supine with B soft wrist restraints donned. The pt will benefit from a 3 day trial of skilled OT to assess her ability to participate and make progress toward goals. At the end of the session, pt left resting comfortably in bed, call bell in reach, with all needs met. Patient will benefit from skilled intervention to address the above impairments. Patient's rehabilitation potential is considered to be Good  Factors which may influence rehabilitation potential include:   []             None noted  [x]             Mental ability/status  [x]             Medical condition  [x]             Home/family situation and support systems  [x]             Safety awareness  []             Pain tolerance/management  []             Other:      PLAN :  Recommendations and Planned Interventions:   [x]               Self Care Training                  [x]      Therapeutic Activities  [x]               Functional Mobility Training   [x]      Cognitive Retraining  [x]               Therapeutic Exercises           [x]      Endurance Activities  [x]               Balance Training                    []      Neuromuscular Re-Education  []               Visual/Perceptual Training     [x]      Home Safety Training  [x]               Patient Education                   [x]      Family Training/Education  []               Other (comment):    Frequency/Duration: Patient will be followed by occupational therapy 1-2 times per day/4-7 days per week to address goals. Discharge Recommendations: Luis Chavis  Further Equipment Recommendations for Discharge: TBD at the next level of care     SUBJECTIVE:   Patient stated Delroy Kathrin are my glasses? \"    OBJECTIVE DATA SUMMARY:     Past Medical History:   Diagnosis Date    Essential hypertension, benign     Mitral valve disorders(424.0)     Moderate MR    Other and unspecified hyperlipidemia     Other specified cardiac dysrhythmias(427.89)     Non-sustained VT on Holter in past    Type II or unspecified type diabetes mellitus without mention of complication, not stated as uncontrolled    History reviewed. No pertinent surgical history. Barriers to Learning/Limitations: yes;  cognitive, sensory deficits-vision/hearing/speech, and altered mental status (i.e.Sedation, Confusion)  Compensate with: visual, verbal, tactile, kinesthetic cues/model    Home Situation:      []  Right hand dominant   []  Left hand dominant    Cognitive/Behavioral Status:  Neurologic State: Alert  Orientation Level: Oriented to person;Disoriented to place  Cognition: Decreased attention/concentration  Safety/Judgement: Decreased insight into deficits; Decreased awareness of need for assistance    Skin: Visible skin appeared intact. Edema: None noted      Balance:  Sitting: Intact    Strength: BUE  Pt unable to follow commands to formally assess BUE strength    Tone & Sensation: BUE  Tone: Normal      Range of Motion: BUE  AROM: Within functional limits      Functional Mobility and Transfers for ADLs:  Bed Mobility:   Supine to Sit: Moderate assistance  Sit to Supine: Maximum assistance;Assist x2  Scooting: Maximum assistance  Transfers:  Sit to Stand: Total assistance;Assist x2 (Pt unable to lift buttocks from bed)      ADL Assessment:   Feeding: Minimum assistance    Oral Facial Hygiene/Grooming: Minimum assistance    Bathing: Maximum assistance    Upper Body Dressing: Maximum assistance    Lower Body Dressing: Total assistance    Toileting: Total assistance    ADL Intervention:  Grooming  Position Performed: Long sitting on bed  Washing Hands: Maximum assistance    Lower Body Dressing Assistance  Dressing Assistance: Total assistance(dependent)  Socks: Total assistance (dependent)    Cognitive Retraining  Safety/Judgement: Decreased insight into deficits; Decreased awareness of need for assistance        Pain:  Pain level pre-treatment: 0/10   Pain level post-treatment: 0/10   Pain Intervention(s): Medication (see MAR); Rest, Ice, Repositioning   Response to intervention: Nurse notified, See doc flow    Activity Tolerance:   Fair    Please refer to the flowsheet for vital signs taken during this treatment. After treatment:   [] Patient left in no apparent distress sitting up in chair  [x] Patient left in no apparent distress in bed with B wrist restraints donned  [x] Call bell left within reach  [x] Nursing notified  [] Caregiver present  [x] Bed alarm activated    COMMUNICATION/EDUCATION:   [x] Role of Occupational Therapy in the acute care setting  [] Home safety education was provided and the patient/caregiver indicated understanding. [] Patient/family have participated as able in goal setting and plan of care. [] Patient/family agree to work toward stated goals and plan of care. [] Patient understands intent and goals of therapy, but is neutral about his/her participation. [x] Patient is unable to participate in goal setting and plan of care. Thank you for this referral.  Thor Montesinos MS, OTR/L  Time Calculation: 26 mins    Eval Complexity: History: MEDIUM Complexity : Expanded review of history including physical, cognitive and psychosocial  history ; Examination: MEDIUM Complexity : 3-5 performance deficits relating to physical, cognitive , or psychosocial skils that result in activity limitations and / or participation restrictions; Decision Making:MEDIUM Complexity : Patient may present with comorbidities that affect occupational performnce.  Miniml to moderate modification of tasks or assistance (eg, physical or verbal ) with assesment(s) is necessary to enable patient to complete evaluation

## 2020-07-23 NOTE — ROUTINE PROCESS
Bedside shift change report given to ZINA rAce (oncoming nurse) by Robert Naranjo RN (offgoing nurse). Report included the following information SBAR, Kardex, Intake/Output and MAR.

## 2020-07-23 NOTE — PROGRESS NOTES
Problem: Falls - Risk of  Goal: *Absence of Falls  Description: Document Clara Cleaning Fall Risk and appropriate interventions in the flowsheet. Outcome: Progressing Towards Goal  Note: Fall Risk Interventions:  Mobility Interventions: Bed/chair exit alarm, Patient to call before getting OOB    Mentation Interventions: Bed/chair exit alarm, More frequent rounding    Medication Interventions: Bed/chair exit alarm, Evaluate medications/consider consulting pharmacy    Elimination Interventions: Call light in reach, Bed/chair exit alarm              Problem: Patient Education: Go to Patient Education Activity  Goal: Patient/Family Education  Outcome: Progressing Towards Goal     Problem: Diabetes Self-Management  Goal: *Disease process and treatment process  Description: Define diabetes and identify own type of diabetes; list 3 options for treating diabetes. Outcome: Progressing Towards Goal  Goal: *Incorporating nutritional management into lifestyle  Description: Describe effect of type, amount and timing of food on blood glucose; list 3 methods for planning meals. Outcome: Progressing Towards Goal  Goal: *Incorporating physical activity into lifestyle  Description: State effect of exercise on blood glucose levels. Outcome: Progressing Towards Goal  Goal: *Developing strategies to promote health/change behavior  Description: Define the ABC's of diabetes; identify appropriate screenings, schedule and personal plan for screenings. Outcome: Progressing Towards Goal  Goal: *Using medications safely  Description: State effect of diabetes medications on diabetes; name diabetes medication taking, action and side effects. Outcome: Progressing Towards Goal  Goal: *Monitoring blood glucose, interpreting and using results  Description: Identify recommended blood glucose targets  and personal targets.   Outcome: Progressing Towards Goal  Goal: *Prevention, detection, treatment of acute complications  Description: List symptoms of hyper- and hypoglycemia; describe how to treat low blood sugar and actions for lowering  high blood glucose level. Outcome: Progressing Towards Goal  Goal: *Prevention, detection and treatment of chronic complications  Description: Define the natural course of diabetes and describe the relationship of blood glucose levels to long term complications of diabetes. Outcome: Progressing Towards Goal  Goal: *Developing strategies to address psychosocial issues  Description: Describe feelings about living with diabetes; identify support needed and support network  Outcome: Progressing Towards Goal  Goal: *Insulin pump training  Outcome: Progressing Towards Goal  Goal: *Sick day guidelines  Outcome: Progressing Towards Goal  Goal: *Patient Specific Goal (EDIT GOAL, INSERT TEXT)  Outcome: Progressing Towards Goal     Problem: Patient Education: Go to Patient Education Activity  Goal: Patient/Family Education  Outcome: Progressing Towards Goal     Problem: Pressure Injury - Risk of  Goal: *Prevention of pressure injury  Description: Document Anthony Scale and appropriate interventions in the flowsheet.   Outcome: Progressing Towards Goal  Note: Pressure Injury Interventions:  Sensory Interventions: Assess changes in LOC    Moisture Interventions: Absorbent underpads    Activity Interventions: Pressure redistribution bed/mattress(bed type)    Mobility Interventions: HOB 30 degrees or less    Nutrition Interventions: Document food/fluid/supplement intake    Friction and Shear Interventions: HOB 30 degrees or less                Problem: Patient Education: Go to Patient Education Activity  Goal: Patient/Family Education  Outcome: Progressing Towards Goal

## 2020-07-24 NOTE — PROGRESS NOTES
Discharge planning    Chart reviewed. PT recommending SNF vs home with home health w/ 24hr assistance. Spoke with Carlo Sanchez, daughter, concerning discharge planning. Guzman Nichole stated \" We want her to come back home. I live with her. Someone is always home. \" The Plan for Transition of Care is related to the following treatment goals:  Home with home health. The patient representative, Carlo Sanchez was provided with a choice of provider and agrees   with the discharge plan. [x] Yes [] No    Freedom of choice list was provided via phone with basic dialogue that supports the patient's individualized plan of care/goals, treatment preferences and shares the quality data associated with the providers. [x] Yes [] No  Freedom of choice obtained via telephone verbal consent from Carlo Sanchez, daughter for [facility] home health. - Will need home health orders at discharge    CM will continue to transitional needs for a safe discharge.      ALIDA Salinas, RN  Pager # [phone]  Care Manager

## 2020-07-24 NOTE — PROGRESS NOTES
helped the patient Lisandro Andrew, who is a 80 y.o.,female, connect with their family with the Zoom Video Connection. The following outcomes were achieved:  Family expressed gratitude for 's visit. Assessment:  There are no further spiritual or Church issues which require Spiritual Care Services interventions at this time. Plan:  Chaplains will continue to follow and will provide pastoral care on an as needed/requested basis.  recommends bedside caregivers page  on duty if patient shows signs of acute spiritual or emotional distress.      [facility]    Spiritual Care   [phone]

## 2020-07-24 NOTE — DIABETES MGMT
GLYCEMIC CONTROL:    Recommendation: consider increasing lantus insulin dose from 10 to 12 units daily. Lab Results   Component Value Date/Time    Hemoglobin A1c 6.9 (H) 07/21/2020 02:03 AM      Lab Results   Component Value Date/Time    Glucose 160 (H) 07/23/2020 05:29 AM     Noted report COVID detected. 80 y.o. patient with history of type 2 diabetes mellitus. Diabetes medications: Unverified. Attempted to contact patient in room but no answer. Also attempted to contact her daughter, Jayy Meyer, ph: [phone], but no answer also  SoloStar (lantus) pen insulin 10 units daily  Novolog meal time insulin 6 units TID AC  Januvia 100 mg daily     Noted:  NSTEMI  COVID-19  T2DM.  A1c 6.9% (7/21/2020)    Diet: cardiac soft; consistent carb today.       Hoda Akbar RN [facility]  Pager: [phone]

## 2020-07-24 NOTE — PROGRESS NOTES
[facility]      CARDIOLOGY PROGRESS NOTE  RECS:        1. NSTEMI- positive troponin. Now down trending. ekg with ST-T wave abnormality in inferior and lateral leads. Continue medical management. Asa.statin. 2. Intermittent bradycardia-tenormin per parameters   3. Elevated D-dimer- on heparin drip per DVT/PE protocol. 4. COV-19 - rapid test 7/21/20 detected- being managed by medical team   5. Hypertension-  Elevated increased Norvasc  Patient on multiple medications for BP control. Will monitor and adjust medications as needed. 6. Hx of nonsustained ventricular tachycardia- no recurrence on this admission will monitor on telemetry           7. Hyperlipidemia- continue statin        8. Type 2 diabetes mellitus without complication- medication per medical team                                    9. Hx of Mitral valve disorder-mild on echo 2015  10. AMS- in the setting of above- on restrains       Continue supportive care  Switch to oral anticoagulation when possible. Conservative medical management. ASSESSMENT:  Hospital Problems  Date Reviewed: 1/26/2017          Codes Class Noted POA    Goals of care, counseling/discussion ICD-10-CM: Z71.89  ICD-9-CM: V65.49  Unknown Unknown        COVID-19 ICD-10-CM: U07.1  ICD-9-CM: 079.89  Unknown Unknown        PNA (pneumonia) ICD-10-CM: J18.9  ICD-9-CM: 793  7/21/2020 Unknown        NSTEMI (non-ST elevated myocardial infarction) [facility]) ICD-10-CM: I21.4  ICD-9-CM: 410.70  7/20/2020 Unknown                SUBJECTIVE:  Confused per nursing staff   OBJECTIVE:    VS:   Visit Vitals  /87 (BP 1 Location: Right arm, BP Patient Position: At rest)   Pulse 69   Temp 97.6 °F (36.4 °C)   Resp 22   Ht 5' 6\" (1.676 m)   Wt 81.6 kg (180 lb)   SpO2 96%   BMI 29.05 kg/m²       No intake or output data in the 24 hours ending 07/24/20 1203     TELE: NSR/ Sinus bradycardia     Limited physical exam to preserve PPE.  Spoke with nursing staff patient is confused on restrains. PULM:  not using accessory muscles on O2 by NC  Heart  NSR sinus bradycardia HR 48's   EXT: no edema visible   SKIN: no acute rash on limited visible skin exam  NEURO: awake, moving all extremities on restrains      Labs: Results:       Chemistry Recent Labs     07/23/20  0529 07/22/20  0203   * 160*    139   K 4.2 3.8    107   CO2 20* 24   BUN 51* 48*   CREA 1.72* 1.62*   CA 9.2 8.8   AGAP 13 8   BUCR 30* 30*   AP 64  --    TP 7.3  --    ALB 2.5*  --    GLOB 4.8*  --    AGRAT 0.5*  --       CBC w/Diff Recent Labs     07/24/20  0300 07/23/20 0529 07/22/20  1928   WBC 12.8 6.4 6.8   RBC 4.20 4.43 4.34   HGB 12.7 13.2 13.0   HCT 37.2 39.0 38.6    324 280   GRANS 92* 81* 78*   LYMPH 4* 14* 13*   EOS 0 0 0      Cardiac Enzymes No results for input(s): CPK, CKND1, BETSEY in the last 72 hours. No lab exists for component: CKRMB, TROIP   Coagulation Recent Labs     07/24/20  0300 07/23/20  1711 07/23/20  0529   PTP  --   --  16.8*   INR  --   --  1.4*   APTT 174.1* >180.0* >180.0*       Lipid Panel Lab Results   Component Value Date/Time    Cholesterol, total 108 07/22/2020 11:41 AM    HDL Cholesterol 34 (L) 07/22/2020 11:41 AM    LDL, calculated 43.8 07/22/2020 11:41 AM    VLDL, calculated 30.2 07/22/2020 11:41 AM    Triglyceride 151 (H) 07/22/2020 11:41 AM    CHOL/HDL Ratio 3.2 07/22/2020 11:41 AM      BNP No results for input(s): BNPP in the last 72 hours. Liver Enzymes Recent Labs     07/23/20  0529   TP 7.3   ALB 2.5*   AP 64      Thyroid Studies No results found for: T4, T3U, TSH, TSHEXT, TSHEXT           Cheyenne Hardin NP-C Zofia:[phone] supervised    I have independently evaluated and examined the patient. All relevant labs and testing data's are reviewed. Care plan discussed and updated after review.     Pedro Gonzalez MD

## 2020-07-24 NOTE — PROGRESS NOTES
Problem: Dysphagia (Adult)  Goal: *Acute Goals and Plan of Care (Insert Text)  Description: Patient will:  1. Tolerate PO trials with 0 s/s overt distress in 4/5 trials  2. Participate in training and education related to continued aspiration risk, diet recs and compensatory strategies     Recommend:   Soft solid diet with thin liquids  Meds as tolerated  Feeding assistance as needed   Aspiration precautions  HOB >45 degrees during all intake and for at least 30 min after po   Small bites/sips, Slow rate of intake, Alternating bites/sips   Oral care three times daily   Assistance with feeds as needed    Outcome: Progressing Towards Goal     SPEECH LANGUAGE PATHOLOGY DYSPHAGIA TREATMENT    Patient: Amairani Ruelas (39 y.o. female)  Date: 7/24/2020  Diagnosis: [facility] [I21.[facility]]   <principal problem not specified>       Precautions:  Aspiration, Skin, Fall, Contact, Other (comment)(droplet plus)  PLOF: As per H&P      ASSESSMENT:  Pt was seen at bedside for follow up dysphagia management. She required assistance with feeds as she was in restraints upon SLP arrival. Pt tolerated reg solids, puree, and thin liquids via straw with no overt s/sx of aspiration. Minimally increased mastication observed secondary to increased work of breathing with PO. Suspect poor endurance with reg solids during meals. Laryngeal elevation appeared slightly weakened to palpation across all trials. Recommend diet change to soft solid with thin liquids, aspiration precautions, oral care TID, and meds as tolerated. ST will continue to follow.      Progression toward goals:  []         Improving appropriately and progressing toward goals  []         Improving slowly and progressing toward goals  [x]         Not making progress toward goals and plan of care will be adjusted     PLAN:  Recommendations and Planned Interventions: See above  Patient continues to benefit from skilled intervention to address the above impairments. Continue treatment per established plan of care. Discharge Recommendations:  Luis Chavis and To Be Determined     SUBJECTIVE:   Patient stated Jaylyn Distance. OBJECTIVE:   Cognitive and Communication Status:  Neurologic State: Alert  Orientation Level: Oriented to person, Disoriented to place  Cognition: Decreased attention/concentration  Safety/Judgement: Decreased insight into deficits, Decreased awareness of need for assistance  Dysphagia Treatment:  Oral Assessment:  Oral Assessment  Labial: No impairment  Dentition: Intact  Oral Hygiene: Good  Lingual: No impairment  Velum: No impairment  Mandible: No impairment  P.O. Trials:   Patient Position: [facility] at [facility]   Vocal quality prior to P.O.: Low volume   Consistency Presented:  Thin liquid, Solid, Mechanical soft   How Presented: SLP-fed/presented, Straw, Successive swallows, Spoon   Bolus Acceptance: No impairment   Bolus Formation/Control: Impaired   Type of Impairment: Mastication, Delayed   Propulsion: Delayed (# of seconds)   Oral Residue: Less than 10% of bolus, Lingual   Initiation of Swallow: No impairment   Laryngeal Elevation: weak   Aspiration Signs/Symptoms: None   Pharyngeal Phase Characteristics: Poor endurance   Effective Modifications: Small sips and bites, Alternate liquids/solids   Cues for Modifications: Maximal       Oral Phase Severity: Mild   Pharyngeal Phase Severity : Mild     PAIN:  Start of Tx: 0  End of Tx: 0     After treatment:   []              Patient left in no apparent distress sitting up in chair  [x]              Patient left in no apparent distress in bed  [x]              Call bell left within reach  [x]              Nursing notified  []              Family present  []              Caregiver present  []              Bed alarm activated      COMMUNICATION/EDUCATION:   [x] Aspiration precautions; swallow safety; compensatory techniques  [x]  Patient unable to participate in training and education, education ongoing with staff     Thank you for this referral.    Ardelle Scheuermann, M.S. CCC-SLP/L  Speech-Language Pathologist

## 2020-07-24 NOTE — CONSULTS
Infectious Disease Consultation Note        Reason: LYMYD-08 pneumonia    Current abx Prior abx   Ceftriaxone, azithromycin since 7/21/20  Solumedrol since 7/22      Lines:       Assessment :    89 with a hx of HTN, MV disorder, hyperlipidemia, and DM type 2 who presented to the ED on 7/21/20 by her daughter because she was sleeping too much. Labs on 7/23-ferritin 656, CRP 8.6, procalcitonin 0.17, d-dimer 3.99  Labs on 7/24-ferritin 603, CRP 5, procalcitonin 0.25, d-dimer 2.15    Positive rapid COVID-19 test 7/21/2020    CT chest 7/19, cxr 7/19- Extensive patchy multifocal airspace disease    Clinical presentation consistent with COVID-19 pneumonia (confirmed). Altered mental status on admission likely metabolic encephalopathy from COVID-19 infection    Elevated D-dimer concerning for hypercoagulable state secondary to COVID-19 infection. No worsening fever or hypoxia noted on today's exam.    Recommendations:    1. Discontinue ceftriaxone. Continue azithromycin  2. Recommend solumedrol  3. Continue therapeutic anticoagulation  4. Monitor inflammatory markers daily-CRP, ferritin, procalcitonin, d-dimer  5. Follow-up clinically to determine further plan of care      Above plan was discussed in details with RN and dr Moe Eastman. Please call me if any further questions or concerns. Will continue to participate in the care of this patient. HPI:      Patient was confused at the time of my evaluation. She did not answer any questions. Detailed review of system not feasible.      home Medication List    Details   doxazosin (CARDURA) 8 mg tablet Take 8 mg by mouth nightly. indapamide (LOZOL) 1.25 mg tablet Take  by mouth daily. escitalopram oxalate (LEXAPRO) 10 mg tablet Take 10 mg by mouth daily. losartan (COZAAR) 100 mg tablet Take 100 mg by mouth daily. hydrALAZINE (APRESOLINE) 50 mg tablet Take 50 mg by mouth three (3) times daily.       atorvastatin (LIPITOR) 40 mg tablet Take 1 Tab by mouth daily. Qty: 90 Tab, Refills: 3      ergocalciferol (ERGOCALCIFEROL) 50,000 unit capsule Take 50,000 Units by mouth every seven (7) days. atenolol (TENORMIN) 100 mg tablet Take 100 mg by mouth two (2) times a day. insulin glargine (LANTUS SOLOSTAR) 100 unit/mL (3 mL) pen 10 Units by SubCUTAneous route daily. insulin aspart (NOVOLOG) 100 unit/mL injection 6 Units by SubCUTAneous route Before breakfast, lunch, and dinner. aspirin 81 mg tablet Take 81 mg by mouth.      sitaGLIPtin (JANUVIA) 100 mg tablet Take 100 mg by mouth daily.              Current Facility-Administered Medications   Medication Dose Route Frequency    atenoloL (TENORMIN) tablet 50 mg  50 mg Oral BID    amLODIPine (NORVASC) tablet 10 mg  10 mg Oral DAILY    methylPREDNISolone (PF) (SOLU-MEDROL) injection 40 mg  40 mg IntraVENous Q12H    heparin 25,000 units in D5W 250 ml infusion  18-36 Units/kg/hr IntraVENous TITRATE    doxazosin (CARDURA) tablet 8 mg  8 mg Oral QHS    hydrALAZINE (APRESOLINE) 20 mg/mL injection 10 mg  10 mg IntraVENous Q6H PRN    aspirin chewable tablet 81 mg  81 mg Oral DAILY    atorvastatin (LIPITOR) tablet 40 mg  40 mg Oral DAILY    insulin glargine (LANTUS) injection 10 Units  10 Units SubCUTAneous QHS    insulin lispro (HUMALOG) injection   SubCUTAneous AC&HS    glucose chewable tablet 16 g  4 Tab Oral PRN    glucagon (GLUCAGEN) injection 1 mg  1 mg IntraMUSCular PRN    dextrose (D50W) injection syrg 12.5-25 g  25-50 mL IntraVENous PRN    zinc sulfate (ZINCATE) 220 (50) mg capsule 1 Cap  1 Cap Oral DAILY    cholecalciferol (VITAMIN D3) capsule 5,000 Units  5,000 Units Oral DAILY    ascorbic acid (vitamin C) (VITAMIN C) tablet 1,000 mg  1,000 mg Oral DAILY    melatonin tablet 3 mg  3 mg Oral QHS PRN    cefTRIAXone (ROCEPHIN) 1 g in sterile water (preservative free) 10 mL IV syringe  1 g IntraVENous Q24H    azithromycin (ZITHROMAX) 500 mg in  mL  500 mg IntraVENous Q24H    sodium chloride (NS) flush 5-40 mL  5-40 mL IntraVENous Q8H    sodium chloride (NS) flush 5-40 mL  5-40 mL IntraVENous PRN       Allergies: Minoxidil    Temp (24hrs), Av.7 °F (36.5 °C), Min:97.4 °F (36.3 °C), Max:98.3 °F (36.8 °C)    Visit Vitals  /69   Pulse (!) 51   Temp 97.5 °F (36.4 °C)   Resp 20   Ht 5' 6\" (1.676 m)   Wt 81.6 kg (180 lb)   SpO2 94%   BMI 29.05 kg/m²       ROS: Unable to obtain  Physical Exam:    General:   awake alert. Not communicative. Skin:   no rashes or skin lesions noted on limited exam   HEENT:  Normocephalic, atraumatic, PERRL, EOMI, no scleral icterus or pallor; no conjunctival hemmohage;     Neck supple, no bruits. Lungs:   non-labored, bilaterally clear to aspiration- no crackles wheezes rales or rhonchi   Heart:  RRR, s1 and s2; 2/6 murmur; no  rubs or gallops, no edema, + pedal pulses   Abdomen:  soft, non-distended, active bowel sounds, no hepatomegaly, no splenomegaly. Non-tender   Genitourinary:  deferred   Extremities:   no clubbing, cyanosis; no joint effusions or swelling; Full ROM of all large joints to the upper and lower extremities; muscle mass appropriate for age; no erythema of LE   Neurologic:  No gross focal motor or sensory abnormalities   Psychiatric:   unable to assess         Labs: Results:   Chemistry Recent Labs     20  0520  0203   * 160*    139   K 4.2 3.8    107   CO2 20* 24   BUN 51* 48*   CREA 1.72* 1.62*   CA 9.2 8.8   AGAP 13 8   BUCR 30* 30*   AP 64  --    TP 7.3  --    ALB 2.5*  --    GLOB 4.8*  --    AGRAT 0.5*  --       CBC w/Diff Recent Labs     20  0300 20  0529 20  1928   WBC 12.8 6.4 6.8   RBC 4.20 4.43 4.34   HGB 12.7 13.2 13.0   HCT 37.2 39.0 38.6    324 280   GRANS 92* 81* 78*   LYMPH 4* 14* 13*   EOS 0 0 0      Microbiology No results for input(s): CULT in the last 72 hours.        RADIOLOGY:    All available imaging studies/reports in [facility] for this admission were reviewed    Dr. Delta Garcia, Infectious Disease Specialist  [phone]  July 24, 2020  10:28 AM

## 2020-07-24 NOTE — ROUTINE PROCESS
Bedside shift change report given to Charli Velazquez RN (oncoming nurse) by Elian Bay RN (offgoing nurse). Report included the following information SBAR, Kardex, Intake/Output and MAR.

## 2020-07-24 NOTE — ROUTINE PROCESS
Received verbal report from [address] [place] [address], report included SBAR, MAR, and Kardex. Gave verbal report to Ronny Rai RN, report included SBAR, MAR, and Kardex.

## 2020-07-24 NOTE — PROGRESS NOTES
[facility] Hospitalist Group  Progress Note    Patient: Arvis Halsted Age: 80 y.o. : 2/15/1929 MR#: [id number] SSN: [id number]  Date: 2020    Subjective/24-hour events:     Continues to require restraints but calm currently. Staff reports continued fluctuations in sats but still able to be managed adequately with oxygen via NC. Assessment:   COVID-19 infection with COVID-19 pneumonia  NSTEMI  DM2  Hypertension  Hypoxia  Abnormal cognition, suspect underlying dementia  Advanced age    Plan:  Continue IV heparin per protocol. Aspirin/statin/beta-blocker. Rocephin and azithromycin continued. IV Solu-Medrol with taper/transition to p.o. when appropriate. Vitamin C/D, zinc supplementation. Scheduled hydralazine discontinued yesterday. Monitor BPs and adjust med regimen as necessary. Sugars still elevated at times. Further insulin adjustments as necessary - DM nurse following. Remains full code currently. Will f/u with palliative medicine for updates on goals of care as necessary. Case discussed with:  []Patient  []Family  [x]Nursing  [x]Case Management  DVT Prophylaxis:  []Lovenox  []Hep SQ  []SCDs  []Coumadin   [x]On Heparin gtt    Objective:   VS:   Visit Vitals  /87 (BP 1 Location: Right arm, BP Patient Position: At rest)   Pulse 69   Temp 97.6 °F (36.4 °C)   Resp 22   Ht 5' 6\" (1.676 m)   Wt 81.6 kg (180 lb)   SpO2 96%   BMI 29.05 kg/m²      Tmax/24hrs: Temp (24hrs), Av.7 °F (36.5 °C), Min:97.4 °F (36.3 °C), Max:98.3 °F (36.8 °C)  No intake or output data in the 24 hours ending 20 1159    General: Nontoxic appearing. Cardiovascular: RRR  Pulmonary: Accessory muscle use. GI:  Abdomen soft, nontender. Extremities: Warm, no edema or ischemia. Neuro: Awake and alert but confused.     Labs:    Recent Results (from the past 24 hour(s))   GLUCOSE, POC    Collection Time: 20 12:05 PM   Result Value Ref Range    Glucose (POC) 186 (H) 70 - 110 mg/dL   ECHO ADULT FOLLOW-UP OR LIMITED    Collection Time: 07/23/20  2:01 PM   Result Value Ref Range    IVSd 1.50 (A) 0.6 - 0.9 cm    LVIDd 3.76 (A) 3.9 - 5.3 cm    LVIDs 2.34 cm    LVPWd 1.40 (A) 0.6 - 0.9 cm    LV Mass .2 67 - 162 g    LV Mass AL Index 105.7 43 - 95 g/m2   GLUCOSE, POC    Collection Time: 07/23/20  4:59 PM   Result Value Ref Range    Glucose (POC) 132 (H) 70 - 110 mg/dL   PTT    Collection Time: 07/23/20  5:11 PM   Result Value Ref Range    aPTT >180.0 (HH) 23.0 - 36.4 SEC   GLUCOSE, POC    Collection Time: 07/23/20  8:25 PM   Result Value Ref Range    Glucose (POC) 195 (H) 70 - 110 mg/dL   PTT    Collection Time: 07/24/20  3:00 AM   Result Value Ref Range    aPTT 174.1 (HH) 23.0 - 36.4 SEC   CBC WITH AUTOMATED DIFF    Collection Time: 07/24/20  3:00 AM   Result Value Ref Range    WBC 12.8 4.6 - 13.2 K/uL    RBC 4.20 4. 20 - 5.30 M/uL    HGB 12.7 12.0 - 16.0 g/dL    HCT 37.2 35.0 - 45.0 %    MCV 88.6 74.0 - 97.0 FL    MCH 30.2 24.0 - 34.0 PG    MCHC 34.1 31.0 - 37.0 g/dL    RDW 14.2 11.6 - 14.5 %    PLATELET 632 969 - 796 K/uL    MPV 12.3 (H) 9.2 - 11.8 FL    NEUTROPHILS 92 (H) 42 - 75 %    BAND NEUTROPHILS 2 0 - 5 %    LYMPHOCYTES 4 (L) 20 - 51 %    MONOCYTES 2 2 - 9 %    EOSINOPHILS 0 0 - 5 %    BASOPHILS 0 0 - 3 %    ABS. NEUTROPHILS 12.0 (H) 1.8 - 8.0 K/UL    ABS. LYMPHOCYTES 0.5 (L) 0.8 - 3.5 K/UL    ABS. MONOCYTES 0.3 0 - 1.0 K/UL    ABS. EOSINOPHILS 0.0 0.0 - 0.4 K/UL    ABS.  BASOPHILS 0.0 0.0 - 0.06 K/UL    DF MANUAL      PLATELET COMMENTS ADEQUATE PLATELETS      RBC COMMENTS SIRIA CELLS  1+        WBC COMMENTS ATYPICAL LYMPHOCYTES PRESENT     FERRITIN    Collection Time: 07/24/20  3:00 AM   Result Value Ref Range    Ferritin 603 (H) 8 - 388 NG/ML   C REACTIVE PROTEIN, QT    Collection Time: 07/24/20  3:00 AM   Result Value Ref Range    C-Reactive protein 5.0 (H) 0 - 0.3 mg/dL   PROCALCITONIN    Collection Time: 07/24/20  3:00 AM   Result Value Ref Range    Procalcitonin 0.25 ng/mL D DIMER    Collection Time: 07/24/20  3:00 AM   Result Value Ref Range    D DIMER 2.15 (H) <0.46 ug/ml(FEU)   GLUCOSE, POC    Collection Time: 07/24/20  8:49 AM   Result Value Ref Range    Glucose (POC) 349 (H) 70 - 110 mg/dL       Signed By: Shirley Medrano MD     July 24, 2020

## 2020-07-24 NOTE — CONSULTS
[facility]: [phone]  RUBEN SYED [facility]: [phone]   [facility]: [phone]    Patient Name: Joan Turcios  YOB: 1929    Date of Initial Consult: 7/23/2020, follow up 7/24/2020   Reason for Consult: Care decisions  Requesting Provider: Kaila Mast MD  Primary Care Physician: Georgia Disla MD      SUMMARY:   Joan Turcios is a 80y.o. year old female with a past history of HTN, MV disorder, hyperlipidemia, and DM type 2 who was admitted on 7/19/2020 from home with a diagnosis of NSTEMI and COVID-19 Pneumonia. Current medical issues leading to Palliative Medicine involvement include: Care decisions. 7/24/2020 comfortable appearing. Calm, in NAD    PALLIATIVE DIAGNOSES:   1.Goals of care  2. Non-ST elevation MI  3. Multifocal pneumonia  4. COVID-19 infection       PLAN:   1. 7/24/2020 follow up along with Ms Zuleika Longoria RN. To preserve PPE patient evaluated via video monitor. She is calm. Spoke with RN calm for the most part. She remains confused. Spoke with another Ivan daughter Shira Hutchins. Ms Karthik Hendricks also shared the call with her friend June Sherman who is palliative RN. We again addressed goals of care burdens of resuscitation, with advanced age  And the effect on her quality of life. All very understanding of information and they are continuing discussions which we highly encouraged. We have also shared with the family the possibility she may not return to her baseline level of functioning or cognition. Goals of care full code with full interventions. ( please see below for previous conversations)     Goals of care:      Saw patient today, she is awake, alert, moving all extremities spontaneously and to purpose. She is trying to remove medical equipments and monitors which are attached to her. She appears to be confused. She is not in any distress.  Called Angela Monet, son of patient when unable to contact daughter, Di Mcdaniel Kathleen Block. Gisel Palmer said to talk to his sister, Joseph Durán about patient. We were able to talk to Joseph Durán and patient's granddaughter today. They said that patient is very active and independent with her ADL prior to this admission. They denied noticing confusion or any other mental issues with patient. They said that patient is mentally alert and very capable of deciding for herself prior to admission. They also said patient is able to walk and goes to the grocery store. Granddaughter said patient and her family has not had any discussion about AMD and goals of care. Discussed the benefits and burdens of goals of care with the granddaughter and with Ms. Joseph Durán. Social: has multiple recent deaths in the family ( son, nephew); one of her son is sick and in the hospital, per granddaughter. 2. Non-ST elevation MI:      Patient is on telemetry monitor and on IV Heparin    3. Multifocal pneumonia:       Patient is on O2 at 4 liters via NC with O2 saturation above 90%. She has no shortness of breath. 4. COVID-19 infection:      Rapid COVID-19 test on 7/21/2020 was detected. TREATMENT PREFERENCES:   Code Status: Full Code    Advance Care Planning:  [] The [facility] Interdisciplinary Team has updated the ACP Navigator with [address] and Patient Capacity    Primary Decision Maker (Legal next of kin): Has no MPOA. Pt does not have an Advance Directive on file in EMR and is not able to complete one currently. Legal Next of Kin would remain as the medical decision maker at this time since Ms. Alfredito Bragg is confused. Pt is a  and has two living adult children. Health care decision making will fall to a consensus of Joseph Durán (daughter) and Meeta Gutierrez(son) Call placed to Meeta Waters, he requested we contact his sister Jolynn Forget she is the caregiver and makes all the decisions. Mabel Villeda, daughter - home number: [phone], mobile number: [phone], e-mail: [email or web address].      [employer] Interventions: Full interventions           Other:  As far as possible, the palliative care team has discussed with patient / health care proxy about goals of care / treatment preferences for patient. HISTORY:     History obtained from: Chart, Daughter, Granddaughter    CHIEF COMPLAINT: NSTEMI, COVID - 19 infection    HPI/SUBJECTIVE:    The patient is:   [] Verbal and participatory  [x] Non-participatory due to: confusion       Clinical Pain Assessment (nonverbal scale for nonverbal patients):   Patient doesn't appear to be having pain. She is focused on removing the medical equipments and monitors attached to her. Activity (Movement): Lying quietly, normal position    Duration: for how long has pt been experiencing pain (e.g., 2 days, 1 month, years)  Frequency: how often pain is an issue (e.g., several times per day, once every few days, constant)     FUNCTIONAL ASSESSMENT:     Palliative Performance Scale (PPS):   PPS: 30    ECOG  ECOG Status : Completely disabled     PSYCHOSOCIAL/SPIRITUAL SCREENING:      Any spiritual / Protestant concerns: unable to assess  [] Yes /  [] No    Caregiver Burnout:  [] Yes /  [] No /  [x] No Caregiver Present      Anticipatory grief assessment: unable to assess  [] Normal  / [] Maladaptive        REVIEW OF SYSTEMS:     Positive and pertinent negative findings in ROS are noted above in HPI. The following systems were [x] reviewed / [] unable to be reviewed as noted in HPI  Other findings are noted below. Systems: constitutional, ears/nose/mouth/throat, respiratory, gastrointestinal, genitourinary, musculoskeletal, integumentary, neurologic, psychiatric, endocrine. Positive findings noted below.   Modified ESAS Completed by: provider   Fatigue: 0 Drowsiness: 0   Depression: 0 Pain: 0   Anxiety: 3       Dyspnea: 0           Stool Occurrence(s): 0        PHYSICAL EXAM:     Wt Readings from Last 3 Encounters:   07/23/20 81.6 kg (180 lb)   01/26/17 82.6 kg (182 lb)   08/25/16 83 kg (183 lb)     Blood pressure 145/89, pulse 62, temperature 97.8 °F (36.6 °C), resp. rate 23, height 5' 6\" (1.676 m), weight 81.6 kg (180 lb), SpO2 93 %. Pain:  Pain Scale 1: Visual  Pain Intensity 1: 0                 Last bowel movement: none recorded  To preserve PPE as patient is COVID + PE is limited   Constitutional: lying In bed calm but moving extremities   Respiratory: breathing not labored         HISTORY:     Active Problems:    NSTEMI (non-ST elevated myocardial infarction) ([facility] [address] (7/20/2020)      PNA (pneumonia) (7/21/2020)      Goals of care, counseling/discussion ()      COVID-19 ()      Past Medical History:   Diagnosis Date    Essential hypertension, benign     Mitral valve disorders(424.0)     Moderate MR    Other and unspecified hyperlipidemia     Other specified cardiac dysrhythmias(427.89)     Non-sustained VT on Holter in past    Type II or unspecified type diabetes mellitus without mention of complication, not stated as uncontrolled       History reviewed. No pertinent surgical history. Family History   Problem Relation Age of Onset    Heart Disease Other         Positive family history of ischemic heart disease. History reviewed, no pertinent family history.   Social History     Tobacco Use    Smoking status: Never Smoker    Smokeless tobacco: Never Used   Substance Use Topics    Alcohol use: No     Allergies   Allergen Reactions    Minoxidil Other (comments)     edema      Current Facility-Administered Medications   Medication Dose Route Frequency    atenoloL (TENORMIN) tablet 25 mg  25 mg Oral BID    amLODIPine (NORVASC) tablet 10 mg  10 mg Oral DAILY    methylPREDNISolone (PF) (SOLU-MEDROL) injection 40 mg  40 mg IntraVENous Q12H    heparin 25,000 units in D5W 250 ml infusion  18-36 Units/kg/hr IntraVENous TITRATE    doxazosin (CARDURA) tablet 8 mg  8 mg Oral QHS    hydrALAZINE (APRESOLINE) 20 mg/mL injection 10 mg  10 mg IntraVENous Q6H PRN    aspirin chewable tablet 81 mg  81 mg Oral DAILY    atorvastatin (LIPITOR) tablet 40 mg  40 mg Oral DAILY    insulin glargine (LANTUS) injection 10 Units  10 Units SubCUTAneous QHS    insulin lispro (HUMALOG) injection   SubCUTAneous AC&HS    glucose chewable tablet 16 g  4 Tab Oral PRN    glucagon (GLUCAGEN) injection 1 mg  1 mg IntraMUSCular PRN    dextrose (D50W) injection syrg 12.5-25 g  25-50 mL IntraVENous PRN    zinc sulfate (ZINCATE) 220 (50) mg capsule 1 Cap  1 Cap Oral DAILY    cholecalciferol (VITAMIN D3) capsule 5,000 Units  5,000 Units Oral DAILY    ascorbic acid (vitamin C) (VITAMIN C) tablet 1,000 mg  1,000 mg Oral DAILY    melatonin tablet 3 mg  3 mg Oral QHS PRN    cefTRIAXone (ROCEPHIN) 1 g in sterile water (preservative free) 10 mL IV syringe  1 g IntraVENous Q24H    azithromycin (ZITHROMAX) 500 mg in  mL  500 mg IntraVENous Q24H    sodium chloride (NS) flush 5-40 mL  5-40 mL IntraVENous Q8H    sodium chloride (NS) flush 5-40 mL  5-40 mL IntraVENous PRN        LAB AND IMAGING FINDINGS:     Lab Results   Component Value Date/Time    WBC 12.8 07/24/2020 03:00 AM    HGB 12.7 07/24/2020 03:00 AM    PLATELET 454 61/56/8003 03:00 AM     Lab Results   Component Value Date/Time    Sodium 142 07/23/2020 05:29 AM    Potassium 4.2 07/23/2020 05:29 AM    Chloride 109 07/23/2020 05:29 AM    CO2 20 (L) 07/23/2020 05:29 AM    BUN 51 (H) 07/23/2020 05:29 AM    Creatinine 1.72 (H) 07/23/2020 05:29 AM    Calcium 9.2 07/23/2020 05:29 AM    Magnesium 2.1 07/23/2020 05:29 AM      Lab Results   Component Value Date/Time    Alk.  phosphatase 64 07/23/2020 05:29 AM    Protein, total 7.3 07/23/2020 05:29 AM    Albumin 2.5 (L) 07/23/2020 05:29 AM    Globulin 4.8 (H) 07/23/2020 05:29 AM     Lab Results   Component Value Date/Time    INR 1.4 (H) 07/23/2020 05:29 AM    Prothrombin time 16.8 (H) 07/23/2020 05:29 AM    aPTT 174.1 (HH) 07/24/2020 03:00 AM      Lab Results   Component Value Date/Time    Ferritin 603 (H) 07/24/2020 03:00 AM      No results found for: PH, PCO2, PO2  No components found for: [place]   Lab Results   Component Value Date/Time     07/21/2020 02:03 AM    CK - MB 4.6 (H) 07/19/2020 08:10 PM              Total time: 25 minutes  Counseling / coordination time, spent as noted above: 20 minutes  > 50% counseling / coordination: Yes with grand daughter Randall Rodriguez     Prolonged service was provided for  []30 min   []75 min in face to face time in the presence of the patient, spent as noted above. Time Start:   Time End:   Note: this can only be billed with 59309 (initial) or 74367 (follow up). If multiple start / stop times, list each separately.

## 2020-07-25 NOTE — PROGRESS NOTES
[facility] Hospitalist Group Progress Note Patient: Harmeet Freeman Age: 80 y.o. : 2/15/1929 MR#: [id number] SSN: [id number] Date: 2020 Subjective/24-hour events:  
 
Agitated earlier this AM per staff. No change in respiratory status or any new issues with oxygenation. Assessment:  
COVID-19 infection with COVID-19 pneumonia NSTEMI 
DM2 Hypertension Hypoxia Abnormal cognition, suspect underlying dementia Advanced age Plan: 
Continue IV heparin per protocol. Aspirin/statin/beta-blocker. Rocephin discontinued , continue Azithromycin per ID. Solumedrol continued, transition to oral and taper when appropriate. PRN ativan for agitation - has already helped per nursing. Continue as ordered. Vitamin and mineral supplementation as ordered. Glycemic control acceptable. Continue lantus and SSI as ordered, adjust dosing further as necessary. Remains full code following most recent discussion between palliative medicine and family on . Supportive care to continue. Follow. Case discussed with:  []Patient  []Family  [x]Nursing  [x]Case Management DVT Prophylaxis:  []Lovenox  []Hep SQ  []SCDs  []Coumadin   [x]On Heparin gtt Objective:  
VS:  
Visit Vitals /62 (BP 1 Location: Left arm, BP Patient Position: At rest) Pulse (!) 54 Temp 97.8 °F (36.6 °C) Resp 14 Ht 5' 6\" (1.676 m) Wt 81.6 kg (180 lb) SpO2 95% BMI 29.05 kg/m² Tmax/24hrs: Temp (24hrs), Av.6 °F (36.4 °C), Min:97.2 °F (36.2 °C), Max:97.8 °F (36.6 °C) No intake or output data in the 24 hours ending 20 1240 General: Nontoxic appearing. Cardiovascular: RRR Pulmonary: Accessory muscle use. GI:  Abdomen soft, nontender. Extremities: Warm, no edema or ischemia. Neuro: Awake and alert but confused. Labs:   
Recent Results (from the past 24 hour(s)) GLUCOSE, POC Collection Time: 20  1:16 PM  
Result Value Ref Range Glucose (POC) 135 (H) 70 - 110 mg/dL PTT Collection Time: 07/24/20  3:40 PM  
Result Value Ref Range aPTT 90.9 (H) 23.0 - 36.4 SEC METABOLIC PANEL, COMPREHENSIVE Collection Time: 07/24/20  3:40 PM  
Result Value Ref Range Sodium 145 136 - 145 mmol/L Potassium 4.0 3.5 - 5.5 mmol/L Chloride 112 (H) 100 - 111 mmol/L  
 CO2 22 21 - 32 mmol/L Anion gap 11 3.0 - 18 mmol/L Glucose 149 (H) 74 - 99 mg/dL BUN 63 (H) 7.0 - 18 MG/DL Creatinine 1.96 (H) 0.6 - 1.3 MG/DL  
 BUN/Creatinine ratio 32 (H) 12 - 20 GFR est AA 29 (L) >60 ml/min/1.73m2 GFR est non-AA 24 (L) >60 ml/min/1.73m2 Calcium 9.3 8.5 - 10.1 MG/DL Bilirubin, total 1.0 0.2 - 1.0 MG/DL  
 ALT (SGPT) 60 (H) 13 - 56 U/L  
 AST (SGOT) 96 (H) 10 - 38 U/L Alk. phosphatase 66 45 - 117 U/L Protein, total 6.5 6.4 - 8.2 g/dL Albumin 2.8 (L) 3.4 - 5.0 g/dL Globulin 3.7 2.0 - 4.0 g/dL A-G Ratio 0.8 0.8 - 1.7 GLUCOSE, POC Collection Time: 07/24/20  4:43 PM  
Result Value Ref Range Glucose (POC) 159 (H) 70 - 110 mg/dL GLUCOSE, POC Collection Time: 07/24/20  5:06 PM  
Result Value Ref Range Glucose (POC) 165 (H) 70 - 110 mg/dL GLUCOSE, POC Collection Time: 07/24/20  8:22 PM  
Result Value Ref Range Glucose (POC) 188 (H) 70 - 110 mg/dL PTT Collection Time: 07/25/20  3:09 AM  
Result Value Ref Range aPTT 88.4 (H) 23.0 - 36.4 SEC FERRITIN Collection Time: 07/25/20  3:09 AM  
Result Value Ref Range Ferritin 528 (H) 8 - 388 NG/ML  
C REACTIVE PROTEIN, QT Collection Time: 07/25/20  3:09 AM  
Result Value Ref Range C-Reactive protein 3.1 (H) 0 - 0.3 mg/dL PROCALCITONIN Collection Time: 07/25/20  3:09 AM  
Result Value Ref Range Procalcitonin 0.22 ng/mL D DIMER Collection Time: 07/25/20  3:09 AM  
Result Value Ref Range D DIMER 1.53 (H) <0.46 ug/ml(FEU) METABOLIC PANEL, COMPREHENSIVE Collection Time: 07/25/20  3:09 AM  
Result Value Ref Range Sodium 144 136 - 145 mmol/L Potassium 4.1 3.5 - 5.5 mmol/L Chloride 112 (H) 100 - 111 mmol/L  
 CO2 23 21 - 32 mmol/L Anion gap 9 3.0 - 18 mmol/L Glucose 194 (H) 74 - 99 mg/dL BUN 62 (H) 7.0 - 18 MG/DL Creatinine 1.89 (H) 0.6 - 1.3 MG/DL  
 BUN/Creatinine ratio 33 (H) 12 - 20 GFR est AA 30 (L) >60 ml/min/1.73m2 GFR est non-AA 25 (L) >60 ml/min/1.73m2 Calcium 9.3 8.5 - 10.1 MG/DL Bilirubin, total 1.7 (H) 0.2 - 1.0 MG/DL  
 ALT (SGPT) 78 (H) 13 - 56 U/L  
 AST (SGOT) 124 (H) 10 - 38 U/L Alk. phosphatase 63 45 - 117 U/L Protein, total 7.0 6.4 - 8.2 g/dL Albumin 2.8 (L) 3.4 - 5.0 g/dL Globulin 4.2 (H) 2.0 - 4.0 g/dL A-G Ratio 0.7 (L) 0.8 - 1.7 GLUCOSE, POC Collection Time: 07/25/20  9:05 AM  
Result Value Ref Range Glucose (POC) 190 (H) 70 - 110 mg/dL GLUCOSE, POC Collection Time: 07/25/20 12:11 PM  
Result Value Ref Range Glucose (POC) 174 (H) 70 - 110 mg/dL Signed By: Silvia Ontiveros MD   
 July 25, 2020

## 2020-07-25 NOTE — PROGRESS NOTES
Cardiology  [address] [place] [address], [address] Aracelis Mari 
[phone] PROGRESS NOTE Jassi Venancio 7/25/2020   2:35 PM 
 
Follow-up of ACS Impression: 1. NSTEMI- positive troponin. Now down trending. ekg with ST-T wave abnormality in inferior and lateral leads. Continue medical management. Asa.statin. 2. Intermittent bradycardia-tenormin per parameters 3. Elevated D-dimer- on heparin drip per DVT/PE protocol. 4. COV-19 - rapid test 7/21/20 detected- being managed by medical team  
5. Hypertension-  Elevated increased Norvasc  Patient on multiple medications for BP control. Will monitor and adjust medications as needed. 6. Hx of nonsustained ventricular tachycardia- no recurrence on this admission will monitor on telemetry          
7. Hyperlipidemia- continue statin       
8. Type 2 diabetes mellitus without complication- medication per medical team                                   
1. 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 AMS- in the setting of above- on restrains Plan:  
1) Follow 2) No change Above mentioned treatment plan was discussed in detail with the patient and communicated with the hospital staff as well. Patient understands the plan and agrees. Interval Epic notes and radiographs independently reviewed. Labs reviewed and time marked. Subjective: Pt stable but borderline Objective:  
 
Visit Vitals: 
Visit Vitals /62 (BP 1 Location: Left arm, BP Patient Position: At rest) Pulse (!) 54 Temp 97.8 °F (36.6 °C) Resp 14 Ht 5' 6\" (1.676 m) Wt 81.6 kg (180 lb) SpO2 95% BMI 29.05 kg/m² Intake/Output Summary (Last 24 hours) No intake or output data in the 24 hours ending 07/25/20 0735 Physical Exam: 
GENERAL: alert, cooperative, no distress, appears stated age SKIN: no rash or abnormalities LYMPHATIC: Cervical, supraclavicular, and axillary nodes normal.  
 HEENT: PERRLA, EOMI and Sclera clear, anicteric NECK: [Exam, [id number] LUNG: clear to auscultation bilaterally HEART: regular rate and rhythm, S1, S2 normal, no murmur, click, rub or gallop BREAST normal appearance, no masses or tenderness ABDOMEN: soft, non-tender. Bowel sounds normal. No masses,  no organomegaly EXTREMITIES:  extremities normal, atraumatic, no cyanosis or edema NEURO: normal without focal findings 
mental status, speech normal, alert and oriented x iii ZENIA 
reflexes normal and symmetric Current Facility-Administration Medications Current Facility-Administered Medications Medication Dose Route Frequency  LORazepam (ATIVAN) injection 1 mg  1 mg IntraVENous Q6H PRN  
 atenoloL (TENORMIN) tablet 25 mg  25 mg Oral BID  amLODIPine (NORVASC) tablet 10 mg  10 mg Oral DAILY  methylPREDNISolone (PF) (SOLU-MEDROL) injection 40 mg  40 mg IntraVENous Q12H  
 heparin 25,000 units in D5W 250 ml infusion  18-36 Units/kg/hr IntraVENous TITRATE  doxazosin (CARDURA) tablet 8 mg  8 mg Oral QHS  hydrALAZINE (APRESOLINE) 20 mg/mL injection 10 mg  10 mg IntraVENous Q6H PRN  
 aspirin chewable tablet 81 mg  81 mg Oral DAILY  atorvastatin (LIPITOR) tablet 40 mg  40 mg Oral DAILY  insulin glargine (LANTUS) injection 10 Units  10 Units SubCUTAneous QHS  insulin lispro (HUMALOG) injection   SubCUTAneous AC&HS  
 glucose chewable tablet 16 g  4 Tab Oral PRN  
 glucagon (GLUCAGEN) injection 1 mg  1 mg IntraMUSCular PRN  
 dextrose (D50W) injection syrg 12.5-25 g  25-50 mL IntraVENous PRN  zinc sulfate (ZINCATE) 220 (50) mg capsule 1 Cap  1 Cap Oral DAILY  cholecalciferol (VITAMIN D3) capsule 5,000 Units  5,000 Units Oral DAILY  ascorbic acid (vitamin C) (VITAMIN C) tablet 1,000 mg  1,000 mg Oral DAILY  melatonin tablet 3 mg  3 mg Oral QHS PRN  
 azithromycin (ZITHROMAX) 500 mg in  mL  500 mg IntraVENous Q24H  sodium chloride (NS) flush 5-40 mL  5-40 mL IntraVENous Q8H  
 sodium chloride (NS) flush 5-40 mL  5-40 mL IntraVENous PRN Allergies Allergen Reactions  Minoxidil Other (comments) edema Past Medical History Past Medical History:  
Diagnosis Date  Essential hypertension, benign  Mitral valve disorders(424.0) Moderate MR  
 Other and unspecified hyperlipidemia  Other specified cardiac dysrhythmias(427.89) Non-sustained VT on Holter in past  
 Type II or unspecified type diabetes mellitus without mention of complication, not stated as uncontrolled Social History Social History Socioeconomic History  Marital status:  Spouse name: Not on file  Number of children: Not on file  Years of education: Not on file  Highest education level: Not on file Occupational History  Not on file Social Needs  Financial resource strain: Not on file  Food insecurity Worry: Not on file Inability: Not on file  Transportation needs Medical: Not on file Non-medical: Not on file Tobacco Use  Smoking status: Never Smoker  Smokeless tobacco: Never Used Substance and Sexual Activity  Alcohol use: No  
 Drug use: No  
 Sexual activity: Not on file Lifestyle  Physical activity Days per week: Not on file Minutes per session: Not on file  Stress: Not on file Relationships  Social connections Talks on phone: Not on file Gets together: Not on file Attends Congregation service: Not on file Active member of club or organization: Not on file Attends meetings of clubs or organizations: Not on file Relationship status: Not on file  Intimate partner violence Fear of current or ex partner: Not on file Emotionally abused: Not on file Physically abused: Not on file Forced sexual activity: Not on file Other Topics Concern  Not on file Social History Narrative  Not on file Family History Family History Problem Relation Age of Onset  Heart Disease Other Positive family history of ischemic heart disease. Constitutional: Negative for chills Skin: Negative for increasing number of lesions, none HEENT: Negative for ear drainage Eyes: Negative for blurred vision Cardiovascular: Negative for No dyspnea on exertion Respiratory: Negative for cough Gastrointestinal: Negative for change in bowel habits Genitourinary: Negative for negative Musculoskeletal: Negative for arthralgias Heme: Negative for bruising Allergies: Negative for rhinorrhea Neurological: Negative for vertigo Psychiatric: Negative for  anxiety Telemetry Findings:NSR Labs last 24 hours: 
Recent Results (from the past 24 hour(s)) PTT Collection Time: 07/24/20  3:40 PM  
Result Value Ref Range aPTT 90.9 (H) 23.0 - 36.4 SEC METABOLIC PANEL, COMPREHENSIVE Collection Time: 07/24/20  3:40 PM  
Result Value Ref Range Sodium 145 136 - 145 mmol/L Potassium 4.0 3.5 - 5.5 mmol/L Chloride 112 (H) 100 - 111 mmol/L  
 CO2 22 21 - 32 mmol/L Anion gap 11 3.0 - 18 mmol/L Glucose 149 (H) 74 - 99 mg/dL BUN 63 (H) 7.0 - 18 MG/DL Creatinine 1.96 (H) 0.6 - 1.3 MG/DL  
 BUN/Creatinine ratio 32 (H) 12 - 20 GFR est AA 29 (L) >60 ml/min/1.73m2 GFR est non-AA 24 (L) >60 ml/min/1.73m2 Calcium 9.3 8.5 - 10.1 MG/DL Bilirubin, total 1.0 0.2 - 1.0 MG/DL  
 ALT (SGPT) 60 (H) 13 - 56 U/L  
 AST (SGOT) 96 (H) 10 - 38 U/L Alk. phosphatase 66 45 - 117 U/L Protein, total 6.5 6.4 - 8.2 g/dL Albumin 2.8 (L) 3.4 - 5.0 g/dL Globulin 3.7 2.0 - 4.0 g/dL A-G Ratio 0.8 0.8 - 1.7 GLUCOSE, POC Collection Time: 07/24/20  4:43 PM  
Result Value Ref Range Glucose (POC) 159 (H) 70 - 110 mg/dL GLUCOSE, POC Collection Time: 07/24/20  5:06 PM  
Result Value Ref Range Glucose (POC) 165 (H) 70 - 110 mg/dL GLUCOSE, POC  
 Collection Time: 07/24/20  8:22 PM  
Result Value Ref Range Glucose (POC) 188 (H) 70 - 110 mg/dL PTT Collection Time: 07/25/20  3:09 AM  
Result Value Ref Range aPTT 88.4 (H) 23.0 - 36.4 SEC FERRITIN Collection Time: 07/25/20  3:09 AM  
Result Value Ref Range Ferritin 528 (H) 8 - 388 NG/ML  
C REACTIVE PROTEIN, QT Collection Time: 07/25/20  3:09 AM  
Result Value Ref Range C-Reactive protein 3.1 (H) 0 - 0.3 mg/dL PROCALCITONIN Collection Time: 07/25/20  3:09 AM  
Result Value Ref Range Procalcitonin 0.22 ng/mL D DIMER Collection Time: 07/25/20  3:09 AM  
Result Value Ref Range D DIMER 1.53 (H) <0.46 ug/ml(FEU) METABOLIC PANEL, COMPREHENSIVE Collection Time: 07/25/20  3:09 AM  
Result Value Ref Range Sodium 144 136 - 145 mmol/L Potassium 4.1 3.5 - 5.5 mmol/L Chloride 112 (H) 100 - 111 mmol/L  
 CO2 23 21 - 32 mmol/L Anion gap 9 3.0 - 18 mmol/L Glucose 194 (H) 74 - 99 mg/dL BUN 62 (H) 7.0 - 18 MG/DL Creatinine 1.89 (H) 0.6 - 1.3 MG/DL  
 BUN/Creatinine ratio 33 (H) 12 - 20 GFR est AA 30 (L) >60 ml/min/1.73m2 GFR est non-AA 25 (L) >60 ml/min/1.73m2 Calcium 9.3 8.5 - 10.1 MG/DL Bilirubin, total 1.7 (H) 0.2 - 1.0 MG/DL  
 ALT (SGPT) 78 (H) 13 - 56 U/L  
 AST (SGOT) 124 (H) 10 - 38 U/L Alk. phosphatase 63 45 - 117 U/L Protein, total 7.0 6.4 - 8.2 g/dL Albumin 2.8 (L) 3.4 - 5.0 g/dL Globulin 4.2 (H) 2.0 - 4.0 g/dL A-G Ratio 0.7 (L) 0.8 - 1.7 GLUCOSE, POC Collection Time: 07/25/20  9:05 AM  
Result Value Ref Range Glucose (POC) 190 (H) 70 - 110 mg/dL GLUCOSE, POC Collection Time: 07/25/20 12:11 PM  
Result Value Ref Range Glucose (POC) 174 (H) 70 - 110 mg/dL Jose Gamboa MD 
7/25/2020 
2:35 PM

## 2020-07-25 NOTE — ROUTINE PROCESS
Received verbal report from [address] [place] [address], report included SBAR, MAR, and Kardex. Gave verbal report to Barton Hui, report included SBAR, MAR, and Kardex.

## 2020-07-25 NOTE — PROGRESS NOTES
conducted a Follow up consultation and Spiritual Assessment for Mariam Escalante, who is a 80 y.o.,female. The  provided the following Interventions: 
Continued the relationship of care and support to family. Listened empathically. Offered assurance of continued prayer on patients behalf. Chart reviewed. The following outcomes were achieved: 
Patient's family expressed gratitude for 's visit. Assessment: 
There are no further spiritual or Oriental orthodox issues which require Spiritual Care Services interventions at this time. Plan: 
Chaplains will continue to follow and will provide pastoral care on an as needed/requested basis.  recommends bedside caregivers page  on duty if patient shows signs of acute spiritual or emotional distress. [address] [place] [address] Care  
[phone]

## 2020-07-26 PROBLEM — U07.1 PNEUMONIA DUE TO 2019 NOVEL CORONAVIRUS: Status: ACTIVE | Noted: 2020-01-01

## 2020-07-26 PROBLEM — J12.82 PNEUMONIA DUE TO 2019 NOVEL CORONAVIRUS: Status: ACTIVE | Noted: 2020-01-01

## 2020-07-26 NOTE — PROGRESS NOTES
Problem: Falls - Risk of 
Goal: *Absence of Falls Description: Document Edith Kee Fall Risk and appropriate interventions in the flowsheet. Outcome: Progressing Towards Goal 
Note: Fall Risk Interventions: 
Mobility Interventions: Communicate number of staff needed for ambulation/transfer Mentation Interventions: Adequate sleep, hydration, pain control Medication Interventions: Bed/chair exit alarm Elimination Interventions: Call light in reach Problem: Patient Education: Go to Patient Education Activity Goal: Patient/Family Education Outcome: Progressing Towards Goal 
  
Problem: Diabetes Self-Management Goal: *Disease process and treatment process Description: Define diabetes and identify own type of diabetes; list 3 options for treating diabetes. Outcome: Progressing Towards Goal 
Goal: *Incorporating nutritional management into lifestyle Description: Describe effect of type, amount and timing of food on blood glucose; list 3 methods for planning meals. Outcome: Progressing Towards Goal 
Goal: *Incorporating physical activity into lifestyle Description: State effect of exercise on blood glucose levels. Outcome: Progressing Towards Goal 
Goal: *Developing strategies to promote health/change behavior Description: Define the ABC's of diabetes; identify appropriate screenings, schedule and personal plan for screenings. Outcome: Progressing Towards Goal 
Goal: *Using medications safely Description: State effect of diabetes medications on diabetes; name diabetes medication taking, action and side effects. Outcome: Progressing Towards Goal 
Goal: *Monitoring blood glucose, interpreting and using results Description: Identify recommended blood glucose targets  and personal targets. Outcome: Progressing Towards Goal 
Goal: *Prevention, detection, treatment of acute complications Description: List symptoms of hyper- and hypoglycemia; describe how to treat low blood sugar and actions for lowering  high blood glucose level. Outcome: Progressing Towards Goal 
Goal: *Prevention, detection and treatment of chronic complications Description: Define the natural course of diabetes and describe the relationship of blood glucose levels to long term complications of diabetes. Outcome: Progressing Towards Goal 
Goal: *Developing strategies to address psychosocial issues Description: Describe feelings about living with diabetes; identify support needed and support network Outcome: Progressing Towards Goal 
Goal: *Insulin pump training Outcome: Progressing Towards Goal 
Goal: *Sick day guidelines Outcome: Progressing Towards Goal 
Goal: *Patient Specific Goal (EDIT GOAL, INSERT TEXT) Outcome: Progressing Towards Goal 
  
Problem: Patient Education: Go to Patient Education Activity Goal: Patient/Family Education Outcome: Progressing Towards Goal 
  
Problem: Pressure Injury - Risk of 
Goal: *Prevention of pressure injury Description: Document Anthony Scale and appropriate interventions in the flowsheet. Outcome: Progressing Towards Goal 
Note: Pressure Injury Interventions: 
Sensory Interventions: Assess changes in LOC Moisture Interventions: Absorbent underpads, Check for incontinence Q2 hours and as needed Activity Interventions: Pressure redistribution bed/mattress(bed type) Mobility Interventions: Pressure redistribution bed/mattress (bed type) Nutrition Interventions: Document food/fluid/supplement intake Friction and Shear Interventions: Apply protective barrier, creams and emollients Problem: Patient Education: Go to Patient Education Activity Goal: Patient/Family Education Outcome: Progressing Towards Goal 
  
Problem: Non-Violent Restraints Goal: *Removal from restraints as soon as assessed to be safe Outcome: Progressing Towards Goal 
Goal: *No harm/injury to patient while restraints in use Outcome: Progressing Towards Goal 
Goal: *Patient's dignity will be maintained Outcome: Progressing Towards Goal 
Goal: *Patient Specific Goal (EDIT GOAL, INSERT TEXT) Outcome: Progressing Towards Goal 
Goal: Non-violent Restaints:Standard Interventions Outcome: Progressing Towards Goal 
Goal: Non-violent Restraints:Patient Interventions Outcome: Progressing Towards Goal 
Goal: Patient/Family Education Outcome: Progressing Towards Goal 
  
Problem: Patient Education: Go to Patient Education Activity Goal: Patient/Family Education Outcome: Progressing Towards Goal 
  
Problem: Patient Education: Go to Patient Education Activity Goal: Patient/Family Education Outcome: Progressing Towards Goal 
  
Problem: Patient Education: Go to Patient Education Activity Goal: Patient/Family Education Outcome: Progressing Towards Goal

## 2020-07-26 NOTE — PROGRESS NOTES
Problem: Falls - Risk of 
Goal: *Absence of Falls Description: Document Manan Lacy Fall Risk and appropriate interventions in the flowsheet. Outcome: Progressing Towards Goal 
Note: Fall Risk Interventions: 
Mobility Interventions: Communicate number of staff needed for ambulation/transfer, Bed/chair exit alarm Mentation Interventions: Adequate sleep, hydration, pain control, Reorient patient Medication Interventions: Bed/chair exit alarm Elimination Interventions: Call light in reach, Patient to call for help with toileting needs

## 2020-07-26 NOTE — PROGRESS NOTES
1220  Pt moaning, unable to answer questions appropriately, concerned about pain. Spoke with MD, will place PRN acetaminophen orders. MD to reduce ativan dosage due to somnolence between doses. 1430 Pt removed gown, tele leads, and peripheral IV. Linens changed, new IV placed and PRN ativan administered. Skin tear noted on left elbow.

## 2020-07-26 NOTE — PROGRESS NOTES
conducted a Follow up consultation and Spiritual Assessment for Lisandro Andrew, who is a 80 y.o.,female. The  provided the following Interventions: 
Continued the relationship of care and support with family. Listened empathically. Offered assurance of continued prayer on patients behalf. Chart reviewed. The following outcomes were achieved: 
Patient's granddaughter expressed gratitude for 's visit. Assessment: 
There are no further spiritual or Scientology issues which require Spiritual Care Services interventions at this time. Plan: 
Chaplains will continue to follow and will provide pastoral care on an as needed/requested basis.  recommends bedside caregivers page  on duty if patient shows signs of acute spiritual or emotional distress. [address] [place] [address]  
[phone]

## 2020-07-26 NOTE — PROGRESS NOTES
Cardiology  [address] [place] [address], [address] Aracelis Mari 
[phone] PROGRESS NOTE Dez Wiliam 7/26/2020   2:06 PM 
 
Follow-up of ACS Impression: 1. NSTEMI- positive troponin. Now down trending. ekg with ST-T wave abnormality in inferior and lateral leads. Continue medical management. Asa.statin. 2. Intermittent bradycardia-tenormin per parameters 3. Elevated D-dimer- on heparin drip per DVT/PE protocol. 4. COV-19 - rapid test 7/21/20 detected- being managed by medical team  
5. Hypertension-  Elevated increased Norvasc  Patient on multiple medications for BP control. Will monitor and adjust medications as needed. 6. Hx of nonsustained ventricular tachycardia- no recurrence on this admission will monitor on telemetry          
7. Hyperlipidemia- continue statin       
8. Type 2 diabetes mellitus without complication- medication per medical team                                   
5. 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 AMS- in the setting of above- on restraints 11. Shortness of breath 12. Mild acute diastolic CHF Plan:  
1) Follow 2) No change 3) Watch HR on beta blocker 4) No additional diuresis needed Above mentioned treatment plan was discussed in detail with the patient and communicated with the hospital staff as well. Patient understands the plan and agrees. Interval Epic notes and radiographs independently reviewed. Labs reviewed and time marked. Subjective: Pt stable but borderline Objective:  
 
Visit Vitals: 
Visit Vitals /62 (BP 1 Location: Right arm, BP Patient Position: At rest) Pulse (!) 51 Temp 97.8 °F (36.6 °C) Resp 15 Ht 5' 6\" (1.676 m) Wt 77.1 kg (170 lb) SpO2 95% BMI 27.44 kg/m² Intake/Output Summary (Last 24 hours) Intake/Output Summary (Last 24 hours) at 7/26/2020 1406 Last data filed at 7/26/2020 1327 Gross per 24 hour Intake 355.72 ml Output   
 Net 355.72 ml Physical Exam: 
GENERAL: alert, cooperative, no distress, appears stated age SKIN: no rash or abnormalities LYMPHATIC: Cervical, supraclavicular, and axillary nodes normal.  
HEENT: PERRLA, EOMI and Sclera clear, anicteric NECK: No JVD LUNG: clear to auscultation bilaterally HEART: regular rate and rhythm, S1, S2 normal, no murmur, click, rub or gallop BREAST normal appearance, no masses or tenderness ABDOMEN: soft, non-tender. Bowel sounds normal. No masses,  no organomegaly EXTREMITIES:  extremities normal, atraumatic, no cyanosis or edema NEURO: normal without focal findings 
mental status, speech normal, alert and oriented x iii ZENIA 
reflexes normal and symmetric Current Facility-Administration Medications Current Facility-Administered Medications Medication Dose Route Frequency  enoxaparin (LOVENOX) injection 30 mg  30 mg SubCUTAneous Q24H  
 [START ON 7/27/2020] pantoprazole (PROTONIX) tablet 40 mg  40 mg Oral ACB  LORazepam (ATIVAN) injection 0.5 mg  0.5 mg IntraVENous Q6H PRN  
 acetaminophen (TYLENOL) tablet 650 mg  650 mg Oral Q6H PRN  
 atenoloL (TENORMIN) tablet 25 mg  25 mg Oral BID  amLODIPine (NORVASC) tablet 10 mg  10 mg Oral DAILY  methylPREDNISolone (PF) (SOLU-MEDROL) injection 40 mg  40 mg IntraVENous Q12H  
 doxazosin (CARDURA) tablet 8 mg  8 mg Oral QHS  hydrALAZINE (APRESOLINE) 20 mg/mL injection 10 mg  10 mg IntraVENous Q6H PRN  
 aspirin chewable tablet 81 mg  81 mg Oral DAILY  atorvastatin (LIPITOR) tablet 40 mg  40 mg Oral DAILY  insulin glargine (LANTUS) injection 10 Units  10 Units SubCUTAneous QHS  insulin lispro (HUMALOG) injection   SubCUTAneous AC&HS  
 glucose chewable tablet 16 g  4 Tab Oral PRN  
 glucagon (GLUCAGEN) injection 1 mg  1 mg IntraMUSCular PRN  
 dextrose (D50W) injection syrg 12.5-25 g  25-50 mL IntraVENous PRN  zinc sulfate (ZINCATE) 220 (50) mg capsule 1 Cap  1 Cap Oral DAILY  cholecalciferol (VITAMIN D3) capsule 5,000 Units  5,000 Units Oral DAILY  ascorbic acid (vitamin C) (VITAMIN C) tablet 1,000 mg  1,000 mg Oral DAILY  melatonin tablet 3 mg  3 mg Oral QHS PRN  
 azithromycin (ZITHROMAX) 500 mg in  mL  500 mg IntraVENous Q24H  
 sodium chloride (NS) flush 5-40 mL  5-40 mL IntraVENous Q8H  
 sodium chloride (NS) flush 5-40 mL  5-40 mL IntraVENous PRN Allergies Allergen Reactions  Minoxidil Other (comments) edema Past Medical History Past Medical History:  
Diagnosis Date  Essential hypertension, benign  Mitral valve disorders(424.0) Moderate MR  
 Other and unspecified hyperlipidemia  Other specified cardiac dysrhythmias(427.89) Non-sustained VT on Holter in past  
 Type II or unspecified type diabetes mellitus without mention of complication, not stated as uncontrolled Social History Social History Socioeconomic History  Marital status:  Spouse name: Not on file  Number of children: Not on file  Years of education: Not on file  Highest education level: Not on file Occupational History  Not on file Social Needs  Financial resource strain: Not on file  Food insecurity Worry: Not on file Inability: Not on file  Transportation needs Medical: Not on file Non-medical: Not on file Tobacco Use  Smoking status: Never Smoker  Smokeless tobacco: Never Used Substance and Sexual Activity  Alcohol use: No  
 Drug use: No  
 Sexual activity: Not on file Lifestyle  Physical activity Days per week: Not on file Minutes per session: Not on file  Stress: Not on file Relationships  Social connections Talks on phone: Not on file Gets together: Not on file Attends [employer] service: Not on file Active member of club or organization: Not on file Attends meetings of clubs or organizations: Not on file Relationship status: Not on file  Intimate partner violence Fear of current or ex partner: Not on file Emotionally abused: Not on file Physically abused: Not on file Forced sexual activity: Not on file Other Topics Concern  Not on file Social History Narrative  Not on file Family History Family History Problem Relation Age of Onset  Heart Disease Other Positive family history of ischemic heart disease. Constitutional: Negative for sweats Skin: Negative for increasing number of lesions, none HEENT: Negative for earaches Eyes: Negative for blurred vision Cardiovascular: Negative for No dyspnea on exertion Respiratory: Negative for cough Gastrointestinal: Negative for change in bowel habits Genitourinary: Negative for negative Musculoskeletal: Negative for arthralgias Heme: Negative for bruising Allergies: Negative for rhinorrhea Neurological: Negative for seizures Psychiatric: Negative for  behavior problems Telemetry Findings:NSR Labs last 24 hours: 
Recent Results (from the past 24 hour(s)) GLUCOSE, POC Collection Time: 07/25/20  3:52 PM  
Result Value Ref Range Glucose (POC) 170 (H) 70 - 110 mg/dL GLUCOSE, POC Collection Time: 07/25/20  9:05 PM  
Result Value Ref Range Glucose (POC) 170 (H) 70 - 110 mg/dL PTT Collection Time: 07/26/20  4:31 AM  
Result Value Ref Range aPTT >180.0 (HH) 23.0 - 36.4 SEC FERRITIN Collection Time: 07/26/20  4:31 AM  
Result Value Ref Range Ferritin 474 (H) 8 - 388 NG/ML  
C REACTIVE PROTEIN, QT Collection Time: 07/26/20  4:31 AM  
Result Value Ref Range C-Reactive protein 1.8 (H) 0 - 0.3 mg/dL PROCALCITONIN Collection Time: 07/26/20  4:31 AM  
Result Value Ref Range Procalcitonin 0.28 ng/mL D DIMER Collection Time: 07/26/20  4:31 AM  
Result Value Ref Range D DIMER 0.81 (H) <0.46 ug/ml(FEU) METABOLIC PANEL, COMPREHENSIVE  Collection Time: 07/26/20  4:31 AM  
 Result Value Ref Range Sodium 144 136 - 145 mmol/L Potassium 3.9 3.5 - 5.5 mmol/L Chloride 113 (H) 100 - 111 mmol/L  
 CO2 24 21 - 32 mmol/L Anion gap 7 3.0 - 18 mmol/L Glucose 147 (H) 74 - 99 mg/dL BUN 54 (H) 7.0 - 18 MG/DL Creatinine 1.54 (H) 0.6 - 1.3 MG/DL  
 BUN/Creatinine ratio 35 (H) 12 - 20 GFR est AA 38 (L) >60 ml/min/1.73m2 GFR est non-AA 32 (L) >60 ml/min/1.73m2 Calcium 9.3 8.5 - 10.1 MG/DL Bilirubin, total 1.1 (H) 0.2 - 1.0 MG/DL  
 ALT (SGPT) 91 (H) 13 - 56 U/L  
 AST (SGOT) 99 (H) 10 - 38 U/L Alk. phosphatase 64 45 - 117 U/L Protein, total 6.7 6.4 - 8.2 g/dL Albumin 2.6 (L) 3.4 - 5.0 g/dL Globulin 4.1 (H) 2.0 - 4.0 g/dL A-G Ratio 0.6 (L) 0.8 - 1.7 GLUCOSE, POC Collection Time: 07/26/20  8:12 AM  
Result Value Ref Range Glucose (POC) 149 (H) 70 - 110 mg/dL GLUCOSE, POC Collection Time: 07/26/20 12:03 PM  
Result Value Ref Range Glucose (POC) 180 (H) 70 - 110 mg/dL Lainey Redd MD 
7/26/2020 
2:06 PM

## 2020-07-27 NOTE — PROGRESS NOTES
Discharge planning Chart reviewed. Family refusing SNF at this time. Discharge plan is for home with home health. CM will continue to assist with transitional needs for a safe discharge. ALIDA Barclay, RN Pager # [phone] Care Manager

## 2020-07-27 NOTE — PROGRESS NOTES
Problem: Dysphagia (Adult) Goal: *Acute Goals and Plan of Care (Insert Text) Description: Patient will: 1. Tolerate PO trials with 0 s/s overt distress in 4/5 trials 2. Participate in training and education related to continued aspiration risk, diet recs and compensatory strategies Recommend:  
Regular diet with thin liquids Meds as tolerated Feeding assistance as needed Aspiration precautions HOB >45 degrees during all intake and for at least 30 min after po Small bites/sips, Slow rate of intake, Alternating bites/sips Oral care three times daily Outcome: Not Progressing Towards Goal 
 
SPEECH LANGUAGE PATHOLOGY DYSPHAGIA TREATMENT Patient: Bertram Dunham (89 y.o. female) Date: 7/27/2020 Diagnosis: [facility]STEM[facility] (non-ST elevated [facility] [I21.4] Pneumonia due to 2019 novel coronavirus Precautions: Aspiration, Skin, Fall, Contact, Other (comment)(droplet plus) PLOF: As per H&P   
 
ASSESSMENT: 
Pt seen for follow up dysphagia tx; minimally participatory/drowsy. Pt tolerating oral care via toothtette. Tolerating x1 sip of thin liquid + straw given max cues/encouragement. Pt accepting x1 presentation of solids with positive mastication/clearance and no overt distress s/sx. Refused all other intake. Will follow x1-2 visits; however, suspect pt currently tolerating safest, least restrictive diet. Progression toward goals: 
[]         Improving appropriately and progressing toward goals 
[]         Improving slowly and progressing toward goals [x]         Not making progress toward goals and plan of care will be adjusted PLAN: 
Recommendations and Planned Interventions: 
Patient continues to benefit from skilled intervention to address the above impairments. Continue treatment per established plan of care. Discharge Recommendations: To Be Determined SUBJECTIVE:  
Patient nonverbal during tx OBJECTIVE:  
Cognitive and Communication Status: Neurologic State: Agitated, Alert, Eyes open to stimulus, Confused Orientation Level: Disoriented X4 Cognition: Impaired decision making Safety/Judgement: Decreased insight into deficits, Decreased awareness of need for assistance Dysphagia Treatment: 
Oral Assessment: 
Oral Assessment Labial: No impairment Dentition: Intact Oral Hygiene: Good Lingual: No impairment Velum: No impairment Mandible: No impairment P.O. Trials: 
 Patient Position: 45 at [facility] Vocal quality prior to P.O.: Low volume Consistency Presented: Thin liquid, Solid, Mechanical soft How Presented: SLP-fed/presented, Straw, Successive swallows, Spoon Bolus Acceptance: No impairment Bolus Formation/Control: Impaired Type of Impairment: Mastication, Delayed Propulsion: Delayed (# of seconds) Oral Residue: Less than 10% of bolus, Lingual 
 Initiation of Swallow: No impairment Laryngeal Elevation: Functional 
 Aspiration Signs/Symptoms: None Pharyngeal Phase Characteristics: Poor endurance Effective Modifications: Small sips and bites, Alternate liquids/solids Cues for Modifications: Maximal  
 Oral Phase Severity: Mild Pharyngeal Phase Severity : Mild PAIN: 
Start of Tx: unable to verbalize End of Tx: unable to verbalize After treatment:  
[]              Patient left in no apparent distress sitting up in chair 
[x]              Patient left in no apparent distress in bed 
[x]              Call bell left within reach [x]              Nursing notified 
[]              Family present 
[]              Caregiver present 
[]              Bed alarm activated COMMUNICATION/EDUCATION:  
[x] Aspiration precautions; swallow safety; compensatory techniques []        Patient/family able to participate in training and education  
[x]  Patient unable to participate in training and education, education ongoing with staff  
[] Patient understands goals and intent of therapy; neutral about participation Shaji Mcconnell M.S., CCC-SLP Speech-Language Pathologist

## 2020-07-27 NOTE — PROGRESS NOTES
[facility] 
 
 
CARDIOLOGY PROGRESS NOTE 
RECS: 
 
 
 
1. NSTEMI- positive troponin. Now down trending. ekg with ST-T wave abnormality in inferior and lateral leads. Continue medical management. Asa.statin. 2. Intermittent bradycardia-discontinued BB 3. Elevated D-dimer- on Lovenox sq daily. 4. COV-19 - rapid test 7/21/20 detected- being managed by medical team  
5. Hypertension- very high BP today with AMS- concerns with possible CVA- pending head CT . CT negative for CVA. Added Clonidine patch 6. Hx of nonsustained ventricular tachycardia- no recurrence on this admission will monitor on telemetry 7. Hyperlipidemia- continue statin 8. Type 2 diabetes mellitus without complication- medication per medical team                                   
9. 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 AMS- worsening. Continue supportive care ASSESSMENT: 
Hospital Problems  Date Reviewed: 1/26/2017 Codes Class Noted POA * (Principal) Pneumonia due to 2019 novel coronavirus ICD-10-CM: U07.1, J12.89 ICD-9-CM: 480.8  7/26/2020 Unknown Goals of care, counseling/discussion ICD-10-CM: Z71.89 ICD-9-CM: V65.49  Unknown Unknown COVID-19 ICD-10-CM: U07.1 ICD-9-CM: 079.89  Unknown Unknown PNA (pneumonia) ICD-10-CM: J18.9 ICD-9-CM: 213  7/21/2020 Unknown [facility]STEM[facility] (non[facility]ST [facility] ICD-10-CM: I21.4 ICD-9-CM: 410.70  7/20/2020 Unknown SUBJECTIVE: 
Confused per nursing staff OBJECTIVE: 
 
VS:  
Visit Vitals BP (!) 228/190 Pulse 71 Temp 97 °F (36.1 °C) Resp 18 Ht 5' 6\" (1.676 m) Wt 77.1 kg (170 lb) SpO2 99% BMI 27.44 kg/m² Intake/Output Summary (Last 24 hours) at 7/27/2020 1356 Last data filed at 7/26/2020 1830 Gross per 24 hour Intake 20 ml Output  Net 20 ml  
  
 
TELE: NSR Limited physical exam to preserve PPE.  Spoke with nursing staff patient is confused CT brain pending PULM:  not using accessory muscles on O2 by NC Heart  NSR sinus bradycardia HR 48's EXT: no edema visible SKIN: no acute rash on limited visible skin exam 
NEURO: awake, moving all extremities on restrains Labs: Results:  
   
Chemistry Recent Labs  
  07/27/20 
9112 07/26/20 
0431 07/25/20 
0309 07/24/20 
1540 * 147* 194* 149* * 144 144 145  
K 4.0 3.9 4.1 4.0  
* 113* 112* 112* CO2 24 24 23 22 BUN 53* 54* 62* 63* CREA 1.62* 1.54* 1.89* 1.96* CA 9.3 9.3 9.3 9.3 AGAP 7 7 9 11 BUCR 33* 35* 33* 32* AP  --  64 63 66 TP  --  6.7 7.0 6.5 ALB  --  2.6* 2.8* 2.8*  
GLOB  --  4.1* 4.2* 3.7 AGRAT  --  0.6* 0.7* 0.8 CBC w/Diff Recent Labs  
  07/27/20 
0209 WBC 11.9 RBC 3.98* HGB 11.7* HCT 35.7  GRANS 87* LYMPH 6*  
EOS 0 Cardiac Enzymes No results for input(s): CPK, CKND1, BETSEY in the last 72 hours. No lab exists for component: Malathi Saldana Coagulation Recent Labs  
  07/27/20 
0209 07/26/20 
0431 APTT 28.5 >180.0* Lipid Panel Lab Results Component Value Date/Time Cholesterol, total 108 07/22/2020 11:41 AM  
 HDL Cholesterol 34 (L) 07/22/2020 11:41 AM  
 LDL, calculated 43.8 07/22/2020 11:41 AM  
 VLDL, calculated 30.2 07/22/2020 11:41 AM  
 Triglyceride 151 (H) 07/22/2020 11:41 AM  
 CHOL/HDL Ratio 3.2 07/22/2020 11:41 AM  
  
BNP No results for input(s): BNPP in the last 72 hours. Liver Enzymes Recent Labs  
  07/26/20 
0431 TP 6.7 ALB 2.6* AP 64 Thyroid Studies No results found for: T4, T3U, TSH, TSHEXT, TSHEXT [address] NP-C Zofia:[phone] supervised I have independently evaluated and examined the patient. All relevant labs and testing data's are reviewed. Care plan discussed and updated after review.  
 
Herman Mart MD

## 2020-07-27 NOTE — PROGRESS NOTES
Infectious Disease progress Note Reason: COVID-19 pneumonia Current abx Prior abx  
azithromycin since 7/21/20 Solumedrol since 7/22 Ceftriaxone 7/21-7/24 Lines:  
 
 
Assessment : 
 
89 with a hx of HTN, MV disorder, hyperlipidemia, and DM type 2 who presented to the ED on 7/21/20 by her daughter because she was sleeping too much. Labs on 7/23-ferritin 656, CRP 8.6, procalcitonin 0.17, d-dimer 3.99 Labs on 7/24-ferritin 603, CRP 5, procalcitonin 0.25, d-dimer 2.15 Labs on 7/27-ferritin 408, CRP 1.5, procalcitonin 0.2, d-dimer 0.90 Positive rapid COVID-19 test 7/21/2020 CT chest 7/19, cxr 7/19- Extensive patchy multifocal airspace disease Clinical presentation consistent with COVID-19 pneumonia (confirmed). Altered mental status on admission likely metabolic encephalopathy from COVID-19 infection Elevated D-dimer concerning for hypercoagulable state secondary to COVID-19 infection. Worsening hypoxia. Currently on high flow at 30 L/min. Significantly high blood pressure with diastolic BP in 702J. Worsening mentation. Clinical presentation concerning for acute CVA Recommendations: 1. Discontinue azithromycin. Start piperacillin/tazobactam to cover for possible aspiration pneumonia 2. Continue Solumedrol 3. Anticoagulation per primary team 
4. Monitor inflammatory markers daily-CRP, ferritin, procalcitonin, d-dimer 5. Consider CT scan of the head to evaluate for stroke 6. Recommend palliative care evaluation to further discuss goals of care Long-term prognosis: poor. Above plan was discussed in details with RN and dr Homer Tay. Will continue to participate in the care of this patient. HPI: 
 
 
Patient was confused at the time of my evaluation. She did not answer any questions. Detailed review of system not feasible. 
 
 
home Medication List  
 Details  
doxazosin (CARDURA) 8 mg tablet Take 8 mg by mouth nightly. indapamide (LOZOL) 1.25 mg tablet Take  by mouth daily. escitalopram oxalate (LEXAPRO) 10 mg tablet Take 10 mg by mouth daily. losartan (COZAAR) 100 mg tablet Take 100 mg by mouth daily. hydrALAZINE (APRESOLINE) 50 mg tablet Take 50 mg by mouth three (3) times daily. atorvastatin (LIPITOR) 40 mg tablet Take 1 Tab by mouth daily. Qty: 90 Tab, Refills: 3  
  
ergocalciferol (ERGOCALCIFEROL) 50,000 unit capsule Take 50,000 Units by mouth every seven (7) days. atenolol (TENORMIN) 100 mg tablet Take 100 mg by mouth two (2) times a day. insulin glargine (LANTUS SOLOSTAR) 100 unit/mL (3 mL) pen 10 Units by SubCUTAneous route daily. insulin aspart (NOVOLOG) 100 unit/mL injection 6 Units by SubCUTAneous route Before breakfast, lunch, and dinner. aspirin 81 mg tablet Take 81 mg by mouth.  
  
sitaGLIPtin (JANUVIA) 100 mg tablet Take 100 mg by mouth daily. Current Facility-Administered Medications Medication Dose Route Frequency  enoxaparin (LOVENOX) injection 30 mg  30 mg SubCUTAneous Q24H  pantoprazole (PROTONIX) tablet 40 mg  40 mg Oral ACB  LORazepam (ATIVAN) injection 0.5 mg  0.5 mg IntraVENous Q6H PRN  
 acetaminophen (TYLENOL) tablet 650 mg  650 mg Oral Q6H PRN  
 atenoloL (TENORMIN) tablet 25 mg  25 mg Oral BID  amLODIPine (NORVASC) tablet 10 mg  10 mg Oral DAILY  methylPREDNISolone (PF) (SOLU-MEDROL) injection 40 mg  40 mg IntraVENous Q12H  
 doxazosin (CARDURA) tablet 8 mg  8 mg Oral QHS  hydrALAZINE (APRESOLINE) 20 mg/mL injection 10 mg  10 mg IntraVENous Q6H PRN  
 aspirin chewable tablet 81 mg  81 mg Oral DAILY  atorvastatin (LIPITOR) tablet 40 mg  40 mg Oral DAILY  insulin glargine (LANTUS) injection 10 Units  10 Units SubCUTAneous QHS  insulin lispro (HUMALOG) injection   SubCUTAneous AC&HS  
 glucose chewable tablet 16 g  4 Tab Oral PRN  
 glucagon (GLUCAGEN) injection 1 mg  1 mg IntraMUSCular PRN  
  dextrose (D50W) injection syrg 12.5-25 g  25-50 mL IntraVENous PRN  zinc sulfate (ZINCATE) 220 (50) mg capsule 1 Cap  1 Cap Oral DAILY  cholecalciferol (VITAMIN D3) capsule 5,000 Units  5,000 Units Oral DAILY  ascorbic acid (vitamin C) (VITAMIN C) tablet 1,000 mg  1,000 mg Oral DAILY  melatonin tablet 3 mg  3 mg Oral QHS PRN  
 azithromycin (ZITHROMAX) 500 mg in  mL  500 mg IntraVENous Q24H  
 sodium chloride (NS) flush 5-40 mL  5-40 mL IntraVENous Q8H  
 sodium chloride (NS) flush 5-40 mL  5-40 mL IntraVENous PRN Allergies: Minoxidil Temp (24hrs), Av.8 °F (36.6 °C), Min:97.4 °F (36.3 °C), Max:98.1 °F (36.7 °C) Visit Vitals BP (!) 201/54 Pulse (!) 51 Temp 97.4 °F (36.3 °C) Resp 17 Ht 5' 6\" (1.676 m) Wt 77.1 kg (170 lb) SpO2 99% BMI 27.44 kg/m² ROS: Unable to obtain Physical Exam: 
 
General:   laying on the bed, mumbling. Not communicative. Skin:   no rashes or skin lesions noted on limited exam  
HEENT:  Normocephalic, atraumatic, PERRL, EOMI, no scleral icterus or pallor; no conjunctival hemmohage;     Neck supple, no bruits. Lungs:   non-labored, bilaterally clear to aspiration- no crackles wheezes rales or rhonchi Heart:  RRR, s1 and s2; no  rubs or gallops, no edema, + pedal pulses Abdomen:  soft, non-distended, active bowel sounds, no hepatomegaly, no splenomegaly. Non-tender Genitourinary:  deferred Extremities:   no clubbing, cyanosis; no joint effusions or swelling; Full ROM of all large joints to the upper and lower extremities; muscle mass appropriate for age; no erythema of LE Neurologic:  Doesn't follow commands. Unable to assess. Psychiatric:   unable to assess Labs: Results:  
Chemistry Recent Labs  
  20 
0886 20 
0431 20 
0309 20 
1540 * 147* 194* 149* * 144 144 145  
K 4.0 3.9 4.1 4.0  
* 113* 112* 112* CO2  BUN 53* 54* 62* 63* CREA 1.62* 1.54* 1.89* 1.96* CA 9.3 9.3 9.3 9.3 AGAP 7 7 9 11 BUCR 33* 35* 33* 32* AP  --  64 63 66 TP  --  6.7 7.0 6.5 ALB  --  2.6* 2.8* 2.8*  
GLOB  --  4.1* 4.2* 3.7 AGRAT  --  0.6* 0.7* 0.8 CBC w/Diff Recent Labs  
  07/27/20 
0209 WBC 11.9 RBC 3.98* HGB 11.7* HCT 35.7  GRANS 87* LYMPH 6*  
EOS 0 Microbiology No results for input(s): CULT in the last 72 hours. RADIOLOGY: 
 
All available imaging studies/reports in [facility] for this admission were reviewed Total time spent >35 minutes. High complexity decision making was performed during the evaluation of this patient at high risk for decompensation with multiple organ involvement Above mentioned total time spent on reviewing the case/medical record/data/notes/EMR/patient examination/documentation/coordinating care with nurse/consultants, exclusive of procedures with complex decision making performed and > 50% time spent in face to face evaluation. Dr. Mayra Rojas, Infectious Disease Specialist 
[phone] July 27, 2020 
10:28 AM

## 2020-07-27 NOTE — PROGRESS NOTES
Pt generated mews score of 3 related to bp of 201/54. Pt given hydralazine 10mg prn and bp reassessed at 1107 and bp was 162/122. Neuro check performed with no change as informed by night nurse. Pt continues with increased agitation. Dr. Grant Crooks then arrived to floor. Attempted to perform range of motion exercises on pt again but pt was too agitated to perform. Pt vitals reassessed at 1230 and pt generated mews score of 3 related to bp of 246/202. Dr. Grant Crooks notified immediately and another order for hydralaziine 20mg given to pt at 1310. Blood sugar checked was 199. Stat head ct ordered and nih performed by primary nurse. Bp upon recheck at 1325 was 228/190. Pt given ativan 0.5mg prn for increased agitation. Pt continues to state, \"help me, my head\" while moaning and groaning. At approx 1402 pt was transported to ct by staff.

## 2020-07-27 NOTE — ROUTINE PROCESS
In Touch consult put in approx 1350 and tele neurology never came on the screen after pt returned from CT. Ainsa Mendoza stroke coordinator notified

## 2020-07-27 NOTE — PROGRESS NOTES
[facility] Hospitalist Group Progress Note Patient: Yee Boss Age: 80 y.o. : 2/15/1929 MR#: [id number] SSN: [id number] Date: 2020 Subjective/24-hour events:  
 
Mostly lethargic/somnolent today, with some intermittent agitation Unarousable to voice, withdraws to pain BP extremely high this afternoon, pt altered Code S called, CT shows no acute findings Assessment:  
 
1. COVID-19 infection with COVID-19 pneumonia 2. Acute hypoxic respiratory failure 3. NSTEMI 4. Acute metabolic encephalopathy/delirium 5. Sinus bradycardia 6. CHF ruled out- after Echo  7. Type II DM 
8. Hypertension 9. Abnormal cognition, suspect underlying dementia 10. Advanced age Plan: 
 
Cardiology and ID following CT head today negative for acute changes Cont ASA, statin Stop BB, start clonidine patch Cont and wean solu-medrol, cont protonix Cont ativan prn for agitation Vitamin and mineral supplementation as ordered. Cont lantus and SSI w/accuchecks FU inflammatory markers PT, OT, SLP evals Aspiration precautions Palliative medicine consulted, pt remains FULL code Discussed pt care, A&P, and very poor prognosis with family who was understanding but at this point would like aggressive care and is hopeful she will return home in previous functional condition Case discussed with:  [x]Patient  []Family  [x]Nursing  []Case Management DVT Prophylaxis:  []Lovenox  []Hep SQ  []SCDs  []Coumadin   [x]On Heparin gtt Diet: Cardiac, modified Code Status: FULL Contact: Sagrario Cano (daughter)   [phone] Disposition: Continue current care; > 2 nights H. Amanda Ramirez DO  
2020 Objective:  
VS:  
Visit Vitals /49 Pulse 71 Temp 97.3 °F (36.3 °C) Resp 15 Ht 5' 6\" (1.676 m) Wt 77.1 kg (170 lb) SpO2 100% BMI 27.44 kg/m² Tmax/24hrs: Temp (24hrs), Av.3 °F (36.3 °C), Min:96 °F (35.6 °C), Max:98.1 °F (36.7 °C) Intake/Output Summary (Last 24 hours) at 7/27/2020 1819 Last data filed at 7/26/2020 1830 Gross per 24 hour Intake 20 ml Output  Net 20 ml General: Nontoxic appearing, somnolent, lethargic, unresponsive today Cardiovascular: RRR Pulmonary: Accessory muscle use. GI:  Abdomen soft, nontender. Extremities: Warm, no edema or ischemia. Neuro: Somnolent, unresponsive today Labs:   
Recent Results (from the past 24 hour(s)) GLUCOSE, POC Collection Time: 07/26/20  9:02 PM  
Result Value Ref Range Glucose (POC) 207 (H) 70 - 110 mg/dL PTT Collection Time: 07/27/20  2:09 AM  
Result Value Ref Range aPTT 28.5 23.0 - 36.4 SEC FERRITIN Collection Time: 07/27/20  2:09 AM  
Result Value Ref Range Ferritin 408 (H) 8 - 388 NG/ML  
C REACTIVE PROTEIN, QT Collection Time: 07/27/20  2:09 AM  
Result Value Ref Range C-Reactive protein 1.5 (H) 0 - 0.3 mg/dL PROCALCITONIN Collection Time: 07/27/20  2:09 AM  
Result Value Ref Range Procalcitonin 0.21 ng/mL D DIMER Collection Time: 07/27/20  2:09 AM  
Result Value Ref Range D DIMER 0.90 (H) <0.46 ug/ml(FEU) CBC WITH AUTOMATED DIFF Collection Time: 07/27/20  2:09 AM  
Result Value Ref Range WBC 11.9 4.6 - 13.2 K/uL  
 RBC 3.98 (L) 4.20 - 5.30 M/uL  
 HGB 11.7 (L) 12.0 - 16.0 g/dL HCT 35.7 35.0 - 45.0 % MCV 89.7 74.0 - 97.0 FL  
 MCH 29.4 24.0 - 34.0 PG  
 MCHC 32.8 31.0 - 37.0 g/dL  
 RDW 14.3 11.6 - 14.5 % PLATELET 453 001 - 046 K/uL MPV 11.3 9.2 - 11.8 FL  
 NEUTROPHILS 87 (H) 40 - 73 % LYMPHOCYTES 6 (L) 21 - 52 % MONOCYTES 7 3 - 10 % EOSINOPHILS 0 0 - 5 % BASOPHILS 0 0 - 2 %  
 ABS. NEUTROPHILS 10.5 (H) 1.8 - 8.0 K/UL  
 ABS. LYMPHOCYTES 0.7 (L) 0.9 - 3.6 K/UL  
 ABS. MONOCYTES 0.8 0.05 - 1.2 K/UL  
 ABS. EOSINOPHILS 0.0 0.0 - 0.4 K/UL  
 ABS. BASOPHILS 0.0 0.0 - 0.1 K/UL  
 DF AUTOMATED METABOLIC PANEL, BASIC  Collection Time: 07/27/20  2:09 AM  
 Result Value Ref Range Sodium 148 (H) 136 - 145 mmol/L Potassium 4.0 3.5 - 5.5 mmol/L Chloride 117 (H) 100 - 111 mmol/L  
 CO2 24 21 - 32 mmol/L Anion gap 7 3.0 - 18 mmol/L Glucose 193 (H) 74 - 99 mg/dL BUN 53 (H) 7.0 - 18 MG/DL Creatinine 1.62 (H) 0.6 - 1.3 MG/DL  
 BUN/Creatinine ratio 33 (H) 12 - 20 GFR est AA 36 (L) >60 ml/min/1.73m2 GFR est non-AA 30 (L) >60 ml/min/1.73m2 Calcium 9.3 8.5 - 10.1 MG/DL  
GLUCOSE, POC Collection Time: 07/27/20  8:24 AM  
Result Value Ref Range Glucose (POC) 196 (H) 70 - 110 mg/dL GLUCOSE, POC Collection Time: 07/27/20  1:05 PM  
Result Value Ref Range Glucose (POC) 199 (H) 70 - 110 mg/dL GLUCOSE, POC Collection Time: 07/27/20  5:07 PM  
Result Value Ref Range Glucose (POC) 192 (H) 70 - 110 mg/dL

## 2020-07-27 NOTE — PROGRESS NOTES
[facility] Hospitalist Group Progress Note Patient: Katelynn Gutierrezgomilton Age: 80 y.o. : 2/15/1929 MR#: [id number] SSN: [id number] Date: 2020 Subjective/24-hour events:  
 
Agitated earlier this AM per staff. No change in respiratory status or any new issues with oxygenation Seen earlier in day Lethargic/somnolent Not arousable to voice, agitated with pain Assessment:  
 
1. COVID-19 infection with COVID-19 pneumonia 2. Acute hypoxic respiratory failure 3. NSTEMI 4. Acute metabolic encephalopathy/delirium 5. Type II DM 6. Hypertension 7. Abnormal cognition, suspect underlying dementia 8. Advanced age Plan: 
 
Cardiology and ID following Continue IV heparin per protocol. Cont ASA, statin, BB w/ holding parameters Cont azithromycin per ID. Cont and wean solu-medrol, cont protonix Cont ativan prn for agitation Vitamin and mineral supplementation as ordered. Cont lantus and SSI w/accuchecks FU inflammatory markers PT, OT, SLP evals Aspiration precautions Palliative medicine consulted, pt remains FULL code Case discussed with:  []Patient  []Family  [x]Nursing  []Case Management DVT Prophylaxis:  []Lovenox  []Hep SQ  []SCDs  []Coumadin   [x]On Heparin gtt Diet: Cardiac, modified Code Status: FULL Disposition: Continue current care; > 2 nights H. Guerda Henrydavidbeatriz,   
2020 Objective:  
VS:  
Visit Vitals /55 (BP 1 Location: Right arm, BP Patient Position: At rest) Pulse (!) 52 Temp 98.1 °F (36.7 °C) Resp 18 Ht 5' 6\" (1.676 m) Wt 77.1 kg (170 lb) SpO2 98% BMI 27.44 kg/m² Tmax/24hrs: Temp (24hrs), Av.8 °F (36.6 °C), Min:97.5 °F (36.4 °C), Max:98.1 °F (36.7 °C) Intake/Output Summary (Last 24 hours) at 20206 Last data filed at 2020 1830 Gross per 24 hour Intake 381.19 ml Output  Net 381.19 ml  
 
 
 General: Nontoxic appearing, somnolent, lethargic, unresponsive today Cardiovascular: RRR Pulmonary: Accessory muscle use. GI:  Abdomen soft, nontender. Extremities: Warm, no edema or ischemia. Neuro: Somnolent, unresponsive today Labs:   
Recent Results (from the past 24 hour(s)) PTT Collection Time: 07/26/20  4:31 AM  
Result Value Ref Range aPTT >180.0 (HH) 23.0 - 36.4 SEC FERRITIN Collection Time: 07/26/20  4:31 AM  
Result Value Ref Range Ferritin 474 (H) 8 - 388 NG/ML  
C REACTIVE PROTEIN, QT Collection Time: 07/26/20  4:31 AM  
Result Value Ref Range C-Reactive protein 1.8 (H) 0 - 0.3 mg/dL PROCALCITONIN Collection Time: 07/26/20  4:31 AM  
Result Value Ref Range Procalcitonin 0.28 ng/mL D DIMER Collection Time: 07/26/20  4:31 AM  
Result Value Ref Range D DIMER 0.81 (H) <0.46 ug/ml(FEU) METABOLIC PANEL, COMPREHENSIVE Collection Time: 07/26/20  4:31 AM  
Result Value Ref Range Sodium 144 136 - 145 mmol/L Potassium 3.9 3.5 - 5.5 mmol/L Chloride 113 (H) 100 - 111 mmol/L  
 CO2 24 21 - 32 mmol/L Anion gap 7 3.0 - 18 mmol/L Glucose 147 (H) 74 - 99 mg/dL BUN 54 (H) 7.0 - 18 MG/DL Creatinine 1.54 (H) 0.6 - 1.3 MG/DL  
 BUN/Creatinine ratio 35 (H) 12 - 20 GFR est AA 38 (L) >60 ml/min/1.73m2 GFR est non-AA 32 (L) >60 ml/min/1.73m2 Calcium 9.3 8.5 - 10.1 MG/DL Bilirubin, total 1.1 (H) 0.2 - 1.0 MG/DL  
 ALT (SGPT) 91 (H) 13 - 56 U/L  
 AST (SGOT) 99 (H) 10 - 38 U/L Alk. phosphatase 64 45 - 117 U/L Protein, total 6.7 6.4 - 8.2 g/dL Albumin 2.6 (L) 3.4 - 5.0 g/dL Globulin 4.1 (H) 2.0 - 4.0 g/dL A-G Ratio 0.6 (L) 0.8 - 1.7 GLUCOSE, POC Collection Time: 07/26/20  8:12 AM  
Result Value Ref Range Glucose (POC) 149 (H) 70 - 110 mg/dL GLUCOSE, POC Collection Time: 07/26/20 12:03 PM  
Result Value Ref Range Glucose (POC) 180 (H) 70 - 110 mg/dL GLUCOSE, POC  Collection Time: 07/26/20  3:07 PM  
 Result Value Ref Range Glucose (POC) 208 (H) 70 - 110 mg/dL GLUCOSE, POC Collection Time: 07/26/20  9:02 PM  
Result Value Ref Range Glucose (POC) 207 (H) 70 - 110 mg/dL

## 2020-07-27 NOTE — PROGRESS NOTES
Patient is not available to be assessed at this time.  went to floor to provide a Zoom call with the grand daughter of the patient. The nurse said that this was not a good time. The Spiritual Care office will try to set up the call tomorrow. Chaplain Promise Kramer Spiritual [facility]  
[phone]

## 2020-07-27 NOTE — ROUTINE PROCESS
Attempted to feed pt breakfast but it was unsuccessful as pt didn't want to eat, but she did take a small sip of orange juice. Pt also refused morning meds. Pt given range of motion exercise by nurse at bedside since she's in restraints. Pt however, continues to be combative during range of motion exercises. Will continue to monitor.

## 2020-07-27 NOTE — PROGRESS NOTES
[phone] [facility]: [phone] Palliative Medicine follow up visit with Karely Kaufman NP, Vanda Howe NP and this writer, Bina Doyle RN. Ms. Tim Conti is currently requiring high flow oxygen. Call placed to pts daughter, Shawna Evans, and son Betsy Tobias via phone. Updated on clinical condition as of 1100. Goals of care discussed with both [facility] and the [facility]. After further discussion of the benefits and burdens of CPR and intubation pts children agree on DNR/DNI. Ms. Karely Kaufman NP, Vanda Howe NP and this writer were witnesses to conversation. We also shared with daughter, with advanced age and COVID 23 clinical condition can be very fluid, meaning her condition can change rapidly. Family is very hopeful that Ms. Tim Conti will recover and return home. Goals of care DNR/DNI continue current medical treatment. Please note at time of our call to the family not aware of code stroke being called. Attending and BSRN aware of code change. Plan on video conference with family at 9:30 tomorrow. Goals of care DNR/DNI orders updated in EMR Bina IRWIN, RN, [facility] Palliative Medicine Inpatient [facility] Palliative [facility] Line: [phone] ([phone])

## 2020-07-27 NOTE — PROGRESS NOTES
Pt unable to maintain O2 saturation above 81 on 6 liters. Notified Respiratory therapist. Pt placed on 13L high flow by respiratory. Will continue to assess and monitor patient.

## 2020-07-27 NOTE — CONSULTS
[facility] [phone] [facility]: [phone] [facility]: [phone] Patient Name: Harmeet Freeman YOB: 1929 Date of Initial Consult: 7/23/2020, follow up 7/24/2020, 7/27/2020 Reason for Consult: Care decisions Requesting Provider: Ana Ramos MD 
Primary Care Physician: Daniel Garcia MD 
  
 SUMMARY:  
Harmeet Freeman is a 80y.o. year old female with a past history of HTN, MV disorder, hyperlipidemia, and DM type 2 who was admitted on 7/19/2020 from home with a diagnosis of NSTEMI and COVID-19 Pneumonia. Current medical issues leading to Palliative Medicine involvement include: Care decisions. 7/27/2020 seen this am around 1100. High flow donned. Alert eyes open. Spoke with daughter Yee Barbour and son Melissa Bennett via phone. DNR/DNI  
 
7/24/2020 comfortable appearing. Calm, in NAD  
 PALLIATIVE DIAGNOSES:  
1.Goals of care 2. Non-ST elevation MI 
3. Multifocal pneumonia 4. COVID-19 infection PLAN:  
1. 7/27/2020 follow up on Ms Terrance Khoury. Noted on requiring high flow oxygen. Seen through window due to COVID 19 isolation. At time of our visit around 1100 she appeared calm, eyes open moving in bed. Discussed with BSRN no issues at that time. Poor po. Spoke with daughter Yee Barbour, and son Melissa Bennett via phone. Updated on clinical condition as of 1100. Re addressed goals of care discussed with both Sara and Jimmy DNR/DNI. Ms Jose E Fontenot and Ms Derrek Horan NP were witnesses to conversation. Plan on video conference with family at 9:30 tomorrow. We also shared with daughter, with advanced age and COVID 23 clinical condition can be very fluid, meaning her condition can change rapidly. Family is very hopeful and prayerful she will recover. Goals of care DNR/DNI continue current medical treatment.  Please note at time of our call to the family not aware of code stroke being called. Attending and BSRN aware of code change. ( please see below for previous conversations) 7/24/2020 follow up along with Ms Enrico Corea RN. To preserve PPE patient evaluated via video monitor. She is calm. Spoke with RN calm for the most part. She remains confused. Spoke with another Ivan daughter Natanael Dickson. Ms Mike Gómez also shared the call with her friend Juana Shirley who is palliative RN. We again addressed goals of care burdens of resuscitation, with advanced age  And the effect on her quality of life. All very understanding of information and they are continuing discussions which we highly encouraged. We have also shared with the family the possibility she may not return to her baseline level of functioning or cognition. Goals of care full code with full interventions. Goals of care: 
    Saw patient today, she is awake, alert, moving all extremities spontaneously and to purpose. She is trying to remove medical equipments and monitors which are attached to her. She appears to be confused. She is not in any distress. Called Melissa Keith, son of patient when unable to contact daughter, Kenan Verma. Melissa Keith said to talk to his sister, Kenan Verma about patient. We were able to talk to Kenan Verma and patient's granddaughter today. They said that patient is very active and independent with her ADL prior to this admission. They denied noticing confusion or any other mental issues with patient. They said that patient is mentally alert and very capable of deciding for herself prior to admission. They also said patient is able to walk and goes to the grocery store. Granddaughter said patient and her family has not had any discussion about AMD and goals of care. Discussed the benefits and burdens of goals of care with the granddaughter and with Ms. Kenan Verma. Social: has multiple recent deaths in the family ( son, nephew); one of her son is sick and in the hospital, per granddaughter. 2. Non-ST elevation MI: 
    Patient is on telemetry monitor and on IV Heparin 3. Multifocal pneumonia:  
    Patient is on O2 at 4 liters via NC with O2 saturation above 90%. She has no shortness of breath. 4. COVID-19 infection: 
    Rapid COVID-19 test on 7/21/2020 was detected. TREATMENT PREFERENCES:  
Code Status: DNR Advance Care Planning: 
[] The [facility] Interdisciplinary Team has updated the ACP Navigator with [address] and Patient Capacity Primary Decision Maker (Legal next of kin): Has no MPOA. Pt does not have an Advance Directive on file in EMR and is not able to complete one currently. Legal Next of Kin would remain as the medical decision maker at this time since Ms. Jessica Gregorio is confused. Pt is a  and has two living adult children. Health care decision making will fall to a consensus of Jordan Grandchild (daughter) and Duncan Jensent(son) Call placed to Duncan Flores, he requested we contact his sister Ayan List she is the caregiver and makes all the decisions. Slim Aguilar, daughter - home number: [phone], mobile number: [phone], e-mail: [email or web address] Medical Interventions: Limited additional interventions Other: As far as possible, the palliative care team has discussed with patient / health care proxy about goals of care / treatment preferences for patient. HISTORY:  
 
History obtained from: Chart, Daughter, Granddaughter CHIEF COMPLAINT: NSTEMI, COVID - 19 infection HPI/SUBJECTIVE: The patient is:  
[] Verbal and participatory [x] Non-participatory due to: confusion Clinical Pain Assessment (nonverbal scale for nonverbal patients):  
Patient doesn't appear to be having pain. She is focused on removing the medical equipments and monitors attached to her. Activity (Movement): Lying quietly, normal position Duration: for how long has pt been experiencing pain (e.g., 2 days, 1 month, years) Frequency: how often pain is an issue (e.g., several times per day, once every few days, constant) FUNCTIONAL ASSESSMENT:  
 
Palliative Performance Scale (PPS): PPS: 30 
 
ECOG 
ECOG Status : Completely disabled PSYCHOSOCIAL/SPIRITUAL SCREENING:  
  
Any spiritual / Adventist concerns: unable to assess 
[] Yes /  [] No 
 
Caregiver Burnout: 
[] Yes /  [] No /  [x] No Caregiver Present Anticipatory grief assessment: unable to assess 
[] Normal  / [] Maladaptive REVIEW OF SYSTEMS:  
 
Positive and pertinent negative findings in ROS are noted above in HPI. The following systems were [x] reviewed / [] unable to be reviewed as noted in HPI Other findings are noted below. Systems: constitutional, ears/nose/mouth/throat, respiratory, gastrointestinal, genitourinary, musculoskeletal, integumentary, neurologic, psychiatric, endocrine. Positive findings noted below. Modified ESAS Completed by: provider Fatigue: 0 Drowsiness: 0 Depression: 0 Pain: 0 Anxiety: 3 Dyspnea: 0 Stool Occurrence(s): 0 PHYSICAL EXAM:  
 
Wt Readings from Last 3 Encounters:  
07/26/20 77.1 kg (170 lb)  
01/26/17 82.6 kg (182 lb)  
08/25/16 83 kg (183 lb) Blood pressure 134/49, pulse 71, temperature 97.3 °F (36.3 °C), resp. rate 15, height 5' 6\" (1.676 m), weight 77.1 kg (170 lb), SpO2 100 %. Pain: 
Pain Scale 1: Adult Nonverbal Pain Scale Pain Intensity 1: 0 Last bowel movement: none recorded To preserve PPE as patient is COVID + PE is limited Constitutional: lying In bed calm but moving extremities, eyes open she appears comfortable Respiratory: breathing not labored, noted to be on high flow, HISTORY:  
 
Principal Problem: 
  Pneumonia due to 2019 novel coronavirus (7/26/2020) Active Problems: 
  NSTEMI (non-ST elevated myocardial infarction) ([facility] [address] (7/20/2020) PNA (pneumonia) (7/21/2020) Goals of care, counseling/discussion () COVID-19 () Past Medical History:  
Diagnosis Date  Essential hypertension, benign  Mitral valve disorders(424.0) Moderate MR  
 Other and unspecified hyperlipidemia  Other specified cardiac dysrhythmias(427.89) Non-sustained VT on Holter in past  
 Type II or unspecified type diabetes mellitus without mention of complication, not stated as uncontrolled History reviewed. No pertinent surgical history. Family History Problem Relation Age of Onset  Heart Disease Other Positive family history of ischemic heart disease. History reviewed, no pertinent family history. Social History Tobacco Use  Smoking status: Never Smoker  Smokeless tobacco: Never Used Substance Use Topics  Alcohol use: No  
 
Allergies Allergen Reactions  Minoxidil Other (comments) edema Current Facility-Administered Medications Medication Dose Route Frequency  piperacillin-tazobactam (ZOSYN) 2.25 g in 0.9% sodium chloride (MBP/ADV) 50 mL MBP  2.25 g IntraVENous Q6H  
 [START ON 7/28/2020] enoxaparin (LOVENOX) injection 70 mg  70 mg SubCUTAneous Q24H  hydrALAZINE (APRESOLINE) 20 mg/mL injection 20 mg  20 mg IntraVENous Q6H PRN  
 cloNIDine (CATAPRES) 0.1 mg/24 hr patch 1 Patch  1 Patch TransDERmal every Monday  pantoprazole (PROTONIX) tablet 40 mg  40 mg Oral ACB  LORazepam (ATIVAN) injection 0.5 mg  0.5 mg IntraVENous Q6H PRN  
 acetaminophen (TYLENOL) tablet 650 mg  650 mg Oral Q6H PRN  
 amLODIPine (NORVASC) tablet 10 mg  10 mg Oral DAILY  methylPREDNISolone (PF) (SOLU-MEDROL) injection 40 mg  40 mg IntraVENous Q12H  
 doxazosin (CARDURA) tablet 8 mg  8 mg Oral QHS  aspirin chewable tablet 81 mg  81 mg Oral DAILY  atorvastatin (LIPITOR) tablet 40 mg  40 mg Oral DAILY  insulin glargine (LANTUS) injection 10 Units  10 Units SubCUTAneous QHS  insulin lispro (HUMALOG) injection   SubCUTAneous AC&HS  
 glucose chewable tablet 16 g  4 Tab Oral PRN  
  glucagon (GLUCAGEN) injection 1 mg  1 mg IntraMUSCular PRN  
 dextrose (D50W) injection syrg 12.5-25 g  25-50 mL IntraVENous PRN  zinc sulfate (ZINCATE) 220 (50) mg capsule 1 Cap  1 Cap Oral DAILY  cholecalciferol (VITAMIN D3) capsule 5,000 Units  5,000 Units Oral DAILY  ascorbic acid (vitamin C) (VITAMIN C) tablet 1,000 mg  1,000 mg Oral DAILY  melatonin tablet 3 mg  3 mg Oral QHS PRN  
 sodium chloride (NS) flush 5-40 mL  5-40 mL IntraVENous Q8H  
 sodium chloride (NS) flush 5-40 mL  5-40 mL IntraVENous PRN  
 
 
 LAB AND IMAGING FINDINGS:  
 
Lab Results Component Value Date/Time WBC 11.9 07/27/2020 02:09 AM  
 HGB 11.7 (L) 07/27/2020 02:09 AM  
 PLATELET 097 68/98/3478 02:09 AM  
 
Lab Results Component Value Date/Time Sodium 148 (H) 07/27/2020 02:09 AM  
 Potassium 4.0 07/27/2020 02:09 AM  
 Chloride 117 (H) 07/27/2020 02:09 AM  
 CO2 24 07/27/2020 02:09 AM  
 BUN 53 (H) 07/27/2020 02:09 AM  
 Creatinine 1.62 (H) 07/27/2020 02:09 AM  
 Calcium 9.3 07/27/2020 02:09 AM  
 Magnesium 2.1 07/23/2020 05:29 AM  
  
Lab Results Component Value Date/Time Alk. phosphatase 64 07/26/2020 04:31 AM  
 Protein, total 6.7 07/26/2020 04:31 AM  
 Albumin 2.6 (L) 07/26/2020 04:31 AM  
 Globulin 4.1 (H) 07/26/2020 04:31 AM  
 
Lab Results Component Value Date/Time INR 1.4 (H) 07/23/2020 05:29 AM  
 Prothrombin time 16.8 (H) 07/23/2020 05:29 AM  
 aPTT 28.5 07/27/2020 02:09 AM  
  
Lab Results Component Value Date/Time Ferritin 408 (H) 07/27/2020 02:09 AM  
  
No results found for: PH, PCO2, PO2 No components found for: [place] Lab Results Component Value Date/Time  07/21/2020 02:03 AM  
 CK - MB 4.6 (H) 07/19/2020 08:10 PM  
  
 
   
 
Total time: 25 minutes Counseling / coordination time, spent as noted above: 20 minutes 
> 50% counseling / coordination: Yes with grand daughter Shania Mcguire Prolonged service was provided for  []30 min   []75 min in face to face time in the presence of the patient, spent as noted above. Time Start:  
Time End:  
Note: this can only be billed with 47473 (initial) or 50252 (follow up). If multiple start / stop times, list each separately.

## 2020-07-27 NOTE — DIABETES MGMT
GLYCEMIC CONTROL: 
 
Current diabetes medications: 
Lantus insulin 10 untis daily at bedtime Sliding scale lispro insulin ACHS. Very resistant dose. POC BG readings: 208, 207, 196. Fasting BG 7/27/2020: 193. Recommendation: consider increasing lantus insulin dose from 10 to 12 units daily. Patient is currently on IV Solumedrol 40 mg every 12 hours. Noted report COVID detected. 80 y.o. patient with history of type 2 diabetes mellitus. Diabetes medications: Unverified. Attempted to contact patient in room but no answer. Also attempted to contact her daughter, Despina Frankel, ph: [phone], but no answer also SoloStar (lantus) pen insulin 10 units daily Novolog meal time insulin 6 units TID AC Januvia 100 mg daily 
  
Noted: 
NSTEMI 
COVID-19 
T2DM. A1c 6.9% (7/21/2020) Diet: cardiac soft solids, consistent carb 1800kcal 
 
  
Nancie James RN [facility] Pager: [phone]

## 2020-07-28 NOTE — PROGRESS NOTES
Problem: Dysphagia (Adult) Goal: *Acute Goals and Plan of Care (Insert Text) Description: Patient will: 1. Tolerate PO trials with 0 s/s overt distress in 4/5 trials 2. Participate in training and education related to continued aspiration risk, diet recs and compensatory strategies Recommend:  
Soft solid diet with thin liquids Meds as tolerated Feeding assistance as needed Aspiration precautions HOB >45 degrees during all intake and for at least 30 min after po Small bites/sips, Slow rate of intake, Alternating bites/sips Oral care three times daily May benefit from an alternate means of nutrition/hydration (? NG) vs comfort feeds for adequate intake Outcome: Progressing Towards Goal 
  
SPEECH LANGUAGE PATHOLOGY DYSPHAGIA TREATMENT Patient: Lisandro Andrew (25 y.o. female) Date: 7/28/2020 Diagnosis: NSTEMI (non-ST elevated myocardial infarction) ([facility] [address] [I21.4] Pneumonia due to 2019 novel coronavirus Precautions:  Aspiration, Skin, Fall, Contact, Other (comment)(droplet plus) PLOF: As per H&P   
 
ASSESSMENT: 
Pt seen for follow up dysphagia tx; minimally participatory/drowsy. Oral care provided by SLP. Pt tolerating oral care via toothtette with no s/sx of aspiration. Tolerating ~1/3 of ensure sips of thin liquid (-) straw given max cues/encouragement. Pt accepting x1 presentation of solids with positive mastication/clearance and no overt distress s/sx. Refused all other intake. Per nursing and MD report: pt with limited intake. Does not appear that pt is aspirating with PO - limited intake may be due to mentation? She may benefit from an alternate means of nutrition/hydration vs comfort feeds. D/w MD. Will follow x1-2 visits; however, suspect pt currently tolerating safest, least restrictive diet when alert and appropriate for PO. Progression toward goals: 
[]         Improving appropriately and progressing toward goals []         Improving slowly and progressing toward goals [x]         Not making progress toward goals - continue to monitor PLAN: 
Recommendations and Planned Interventions: See above Patient continues to benefit from skilled intervention to address the above impairments. Continue treatment per established plan of care. Discharge Recommendations:  Luis Chavis and To Be Determined SUBJECTIVE:  
Patient stated No. OBJECTIVE:  
Cognitive and Communication Status: 
Neurologic State: Confused, Agitated Orientation Level: Disoriented X4 Cognition: No command following, Poor safety awareness Safety/Judgement: Decreased insight into deficits, Decreased awareness of need for assistance Dysphagia Treatment: 
Oral Assessment: 
Oral Assessment Labial: No impairment Dentition: Intact Oral Hygiene: Good Lingual: No impairment Velum: No impairment Mandible: No impairment P.O. Trials: 
 Patient Position: 45 at [facility] Vocal quality prior to P.O.: Low volume Consistency Presented: Thin liquid, Solid, Mechanical soft How Presented: SLP-fed/presented, Straw, Successive swallows, Spoon Bolus Acceptance: No impairment Bolus Formation/Control: Impaired Type of Impairment: Mastication, Delayed Propulsion: Delayed (# of seconds) Oral Residue: Less than 10% of bolus, Lingual 
 Initiation of Swallow: No impairment Laryngeal Elevation: Functional 
 Aspiration Signs/Symptoms: None Pharyngeal Phase Characteristics: Poor endurance Effective Modifications: Small sips and bites, Alternate liquids/solids Cues for Modifications: Maximal 
   
 Oral Phase Severity: Mild Pharyngeal Phase Severity : Mild PAIN: 
Start of Tx: 0 End of Tx: 0 After treatment:  
[]              Patient left in no apparent distress sitting up in chair 
[x]              Patient left in no apparent distress in bed 
[x]              Call bell left within reach [x]              Nursing notified []              Family present 
[]              Caregiver present 
[]              Bed alarm activated COMMUNICATION/EDUCATION:  
[x] Aspiration precautions; swallow safety; compensatory techniques [x]  Patient unable to participate in training and education, education ongoing with staff Thank you for this referral. 
 
Janel Olivo M.S. CCC-SLP/L Speech-Language Pathologist

## 2020-07-28 NOTE — PROGRESS NOTES
Bedside and Verbal shift change report given to Gisela Merino (oncoming nurse) by Asif Burton (offgoing nurse). Report included the following information SBAR, Kardex, Intake/Output and MAR.

## 2020-07-28 NOTE — PROGRESS NOTES
Problem: Mobility Impaired (Adult and Pediatric) Goal: *Acute Goals and Plan of Care (Insert Text) Description: Physical Therapy Goals Initiated 7/23/2020 and to be accomplished within 7 day(s) 1. Patient will move from supine to sit and sit to supine  in bed with supervision/set-up. 2.  Patient will transfer from bed to chair and chair to bed with supervision/set-up using the least restrictive device. 3.  Patient will perform sit to stand with supervision/set-up. 4.  Patient will ambulate with supervision/set-up for 50 feet with the least restrictive device. PLOF: Unknown. Per care management note, patient lives with her daughter and was independent with ADL's. Outcome: Progressing Towards Goal 
 PHYSICAL THERAPY TREATMENT Patient: Eloy Mancilla (56 y.o. female) Date: 7/28/2020 Diagnosis: NSTEMI (non-ST elevated myocardial infarction) ([facility] [address] [I21.4] Pneumonia due to 2019 novel coronavirus Precautions: Aspiration, Skin, Fall, Contact, Other (comment)(droplet plus) PLOF:  
 
ASSESSMENT: 
Pt presents supine in bed with BUE restraints. Nursing reports pt was given meds to calm her due to \"being all over the place. \" Restraints were removed and pt required max assist for bed mobility and for supine to sit. Therapist had to hold pt's hand due to pt continues to attempt to pull off leads. Pt became incontinent of bladder on EOB after sitting for ~4minutes and required max assist for sit to supine. Pt required max assist for rolling to change chux pad and perform hygiene on pt. Pt cognitively unable to participate appropriately. Progression toward goals:  
[]      Improving appropriately and progressing toward goals 
[]      Improving slowly and progressing toward goals [x]      Not making progress toward goals and plan of care will be adjusted PLAN: 
Patient continues to benefit from skilled intervention to address the above impairments. Continue treatment per established plan of care. Discharge Recommendations:  Luis Chavis Further Equipment Recommendations for Discharge:  TBD SUBJECTIVE:  
Patient stated pt non-verbal today OBJECTIVE DATA SUMMARY:  
Critical Behavior: 
Neurologic State: Confused, Agitated Orientation Level: Disoriented X4 Cognition: No command following, Memory loss, Impaired decision making Safety/Judgement: Decreased insight into deficits, Decreased awareness of need for assistance Functional Mobility Training: 
Bed Mobility: 
Rolling: Maximum assistance Supine to Sit: Maximum assistance Sit to Supine: Maximum assistance Transfers: 
  
 Balance: 
Sitting: Intact Range Of Motion: 
     
 Posture: 
 Posture (WDL): Exceptions to [facility] Posture Assessment: Forward head;Rounded shoulders;Trunk flexion Wheelchair Mobility: 
   
   
Ambulation/Gait Training: 
 Stairs: 
  
  
  
Neuro Re-Education: 
 
Therapeutic Exercises:  
 
 
 
EXERCISE Sets Reps Active Active Assist  
Passive Self ROM Comments Ankle Pumps    [] [] [] [] Quad Sets/Glut Sets    [] [] [] [] Hold for 5 secs Hamstring Sets   [] [] [] [] Short Arc Quads   [] [] [] [] Heel Slides   [] [] [] [] Straight Leg Raises   [] [] [] [] Hip Add   [] [] [] [] Hold for 5 secs, w/ pillow squeeze Long Arc Quads   [] [] [] [] Seated Marching   [] [] [] []   
Standing Marching   [] [] [] []   
   [] [] [] []   
 
 
Pain: 
Pain level pre-treatment: /10 Pain level post-treatment: /10 Pain Intervention(s): Medication (see MAR); Rest, Ice, Repositioning Response to intervention: Nurse notified, See doc flow Activity Tolerance:  
fair Please refer to the flowsheet for vital signs taken during this treatment. After treatment:  
[] Patient left in no apparent distress sitting up in chair 
[x] Patient left in no apparent distress in bed 
[] Call bell left within reach [x] Nursing present [] Caregiver present 
[] Bed alarm activated 
[] SCDs applied COMMUNICATION/EDUCATION:  
[]         Role of Physical Therapy in the acute care setting. []         Fall prevention education was provided and the patient/caregiver indicated understanding. []         Patient/family have participated as able in working toward goals and plan of care. []         Patient/family agree to work toward stated goals and plan of care. []         Patient understands intent and goals of therapy, but is neutral about his/her participation. [x]         Patient is unable to participate in stated goals/plan of care: ongoing with therapy staff. 
[]         Other: 
 
   
Lexis Lopez PTA Time Calculation: 13 mins

## 2020-07-28 NOTE — PROGRESS NOTES
Problem: Falls - Risk of 
Goal: *Absence of Falls Description: Document Clara Cleaning Fall Risk and appropriate interventions in the flowsheet. Outcome: Progressing Towards Goal 
Note: Fall Risk Interventions: 
Mobility Interventions: Bed/chair exit alarm, Communicate number of staff needed for ambulation/transfer Mentation Interventions: Bed/chair exit alarm, Evaluate medications/consider consulting pharmacy, Increase mobility, More frequent rounding, Reorient patient, Toileting rounds, Update white board Medication Interventions: Bed/chair exit alarm, Evaluate medications/consider consulting pharmacy Elimination Interventions: Bed/chair exit alarm, Call light in reach Problem: Patient Education: Go to Patient Education Activity Goal: Patient/Family Education Outcome: Progressing Towards Goal 
  
Problem: Diabetes Self-Management Goal: *Disease process and treatment process Description: Define diabetes and identify own type of diabetes; list 3 options for treating diabetes. Outcome: Progressing Towards Goal 
Goal: *Incorporating nutritional management into lifestyle Description: Describe effect of type, amount and timing of food on blood glucose; list 3 methods for planning meals. Outcome: Progressing Towards Goal 
Goal: *Incorporating physical activity into lifestyle Description: State effect of exercise on blood glucose levels. Outcome: Progressing Towards Goal 
Goal: *Developing strategies to promote health/change behavior Description: Define the ABC's of diabetes; identify appropriate screenings, schedule and personal plan for screenings. Outcome: Progressing Towards Goal 
Goal: *Using medications safely Description: State effect of diabetes medications on diabetes; name diabetes medication taking, action and side effects. Outcome: Progressing Towards Goal 
Goal: *Monitoring blood glucose, interpreting and using results Description: Identify recommended blood glucose targets  and personal targets. Outcome: Progressing Towards Goal 
Goal: *Prevention, detection, treatment of acute complications Description: List symptoms of hyper- and hypoglycemia; describe how to treat low blood sugar and actions for lowering  high blood glucose level. Outcome: Progressing Towards Goal 
Goal: *Prevention, detection and treatment of chronic complications Description: Define the natural course of diabetes and describe the relationship of blood glucose levels to long term complications of diabetes. Outcome: Progressing Towards Goal 
Goal: *Developing strategies to address psychosocial issues Description: Describe feelings about living with diabetes; identify support needed and support network Outcome: Progressing Towards Goal 
Goal: *Insulin pump training Outcome: Progressing Towards Goal 
Goal: *Sick day guidelines Outcome: Progressing Towards Goal 
Goal: *Patient Specific Goal (EDIT GOAL, INSERT TEXT) Outcome: Progressing Towards Goal 
  
Problem: Patient Education: Go to Patient Education Activity Goal: Patient/Family Education Outcome: Progressing Towards Goal 
  
Problem: Pressure Injury - Risk of 
Goal: *Prevention of pressure injury Description: Document Anthony Scale and appropriate interventions in the flowsheet. Outcome: Progressing Towards Goal 
Note: Pressure Injury Interventions: 
Sensory Interventions: Assess changes in LOC, Check visual cues for pain, Float heels, Keep linens dry and wrinkle-free, Minimize linen layers, Monitor skin under medical devices, Pressure redistribution bed/mattress (bed type), Turn and reposition approx. every two hours (pillows and wedges if needed) Moisture Interventions: Absorbent underpads, Minimize layers Activity Interventions: Pressure redistribution bed/mattress(bed type) Mobility Interventions: HOB 30 degrees or less, Pressure redistribution bed/mattress (bed type), Turn and reposition approx. every two hours(pillow and wedges) Nutrition Interventions: Document food/fluid/supplement intake, Offer support with meals,snacks and hydration Friction and Shear Interventions: Apply protective barrier, creams and emollients, Foam dressings/transparent film/skin sealants, HOB 30 degrees or less, Minimize layers, Transferring/repositioning devices Problem: Patient Education: Go to Patient Education Activity Goal: Patient/Family Education Outcome: Progressing Towards Goal 
  
Problem: Non-Violent Restraints Goal: *Removal from restraints as soon as assessed to be safe Outcome: Progressing Towards Goal 
Goal: *No harm/injury to patient while restraints in use Outcome: Progressing Towards Goal 
Goal: *Patient's dignity will be maintained Outcome: Progressing Towards Goal 
Goal: *Patient Specific Goal (EDIT GOAL, INSERT TEXT) Outcome: Progressing Towards Goal 
Goal: Non-violent Restaints:Standard Interventions Outcome: Progressing Towards Goal 
Goal: Non-violent Restraints:Patient Interventions Outcome: Progressing Towards Goal 
Goal: Patient/Family Education Outcome: Progressing Towards Goal 
  
Problem: Patient Education: Go to Patient Education Activity Goal: Patient/Family Education Outcome: Progressing Towards Goal 
  
Problem: Patient Education: Go to Patient Education Activity Goal: Patient/Family Education Outcome: Progressing Towards Goal 
  
Problem: Nutrition Deficit Goal: *Optimize nutritional status Outcome: Progressing Towards Goal

## 2020-07-28 NOTE — PROGRESS NOTES
Palliative Medicine Consult DR. CONT[facility]: [phone] [facility]: [phone] 
  
Palliative Medicine follow up visit with Abby Adams NP and this Rosalea Cogan RN. Assisted  with video assisted call to family members. Ms. Tanya Childs is currently requiring high flow oxygen and is agitated. Calling for Sakshi to \"take her hand\". Family was kept distress at seeing the drastic change in the family member. Palliative Medicine team members placed conference call to pt's granddaughter, Army Chau. She was able to conference in other family members including, pt's daughter Carlo Sanchez, and son, Arun Guzman. Updated on clinical condition as of 1600. Comfort approach to care was discussed. Family members were provided time to ask questions and address their concerns. They are struggling with the rapid decline in Ms. Tanya Childs since serenity COVID 19. They would like time to consider the option of staying on the current course or transitioning to comfort/hospice at home. Palliative team will follow up with family for continued support. Attedning updated on conversation with family. Goals of care DNR/DNI continue current medical treatment.  
 
  
Roberto HECKN, RN, [facility] Palliative Medicine Inpatient DR. PELAYO [facility] Palliative [facility] Line: [phone])

## 2020-07-28 NOTE — ROUTINE PROCESS
0900- Patient disoriented x4. This nurse attempted to give medication crushed in small amount of applesauce. After spoon with small amount of apple sauce was placed on patients lip, patient opened and ate apple sauce with no complications. Patient did not take anything further for this nurse. 1200- Patient further given medications crushed in apple sauce, same procedure of placing on patients lip. Per speech therapy recommendations small amount of liquid dropped through straw. Patient ate 8 pieces of pineapple, small amount of apple sauce and approx 20 ml of glucerna supplement. Patient started to purse lips and would not taken any further nourishment. 1600- Received order to renew restraint orders from Dr. Teri Velasco over the phone.

## 2020-07-28 NOTE — PROGRESS NOTES
helped the patient Bertram Dunham, who is a 80 y.o.,female, connect with her family with the Zoom Video Connection. The following outcomes were achieved: 
Family expressed gratitude for 's visit. Patient was not interacting with the family. Assessment: 
There are no further spiritual or Church issues which require Spiritual Care Services interventions at this time. Plan: 
Chaplains will continue to follow and will provide pastoral care on an as needed/requested basis. [facility] Spiritual Care [phone]

## 2020-07-28 NOTE — PROGRESS NOTES
Verbal shift change report given to Corbin Sky (oncoming nurse) by Kennedi Bustos (offgoing nurse). Report included the following information SBAR, Kardex, MAR, Recent Results, Med Rec Status and Alarm Parameters .

## 2020-07-28 NOTE — CONSULTS
Palliative Medicine Consult DR. CONTE'S [facility]: [phone] ([phone]) [facility] SY[facility]: [phone] [facility]: [phone] Patient Name: An Lucas YOB: 1929 Date of Initial Consult: 2020, follow up 2020, 2020, 2020 Reason for Consult: Care decisions Requesting Provider: Nolan Waters MD 
Primary Care Physician: Mic Ya MD 
  
 SUMMARY:  
An Lucas is a 80y.o. year old female with a past history of HTN, MV disorder, hyperlipidemia, and DM type 2 who was admitted on 2020 from home with a diagnosis of NSTEMI and COVID-19 Pneumonia. Current medical issues leading to Palliative Medicine involvement include: Care decisions. 2020 agitated at times, calling for sister who is . Long conversation with daughter Charli Hernandez, granddaughter Yee Dodson. Discussed comfort option. They are discussing. 2020 seen this am around 1100. High flow donned. Alert eyes open. Spoke with daughter Bobbi Trejo and son Mau Thomas via phone. DNR/DNI  
 
2020 comfortable appearing. Calm, in NAD  
 PALLIATIVE DIAGNOSES:  
1.Goals of care 2. Non-ST elevation MI 
3. Multifocal pneumonia 4. COVID-19 infection PLAN:  
1.2020 follow up along with Ms Zachery Brumfield RN. Agitated, calling for her  sister Jose De Jesus Parson, \" take my hand Sakshi\" Family was able to participate in video ( daughter Charli Hernandez, grand daughter Yee Dodson, and other family members) They were visibly upset and could be patient's distress. After video participated in family conference via phone with the above attendees. Updated on overall condition. Honest but compassionate discussion. Unfortunately despite best treatment and efforts she is declining, not eating, confused agitated. All likely due to the effects of COVD 19 and MI. All questions answered to the best of our ability.  Discussed moving to comfort measures, symptom management, vs continuing current course. Hospice at home also discussed. Difficult discussions with family. We offered support in this difficult time. Shared with family unfortunately Ms Gutierrez's prognosis is poor and although difficult to predict her time maybe short. Family wishes to consider all options. Currently no change in care decisions, continue treatment. Goals of care DNR/DNI limited interventions. Continue current level of care. Attending updated on conversation. ( please see below for previous conversations) 7/27/2020 follow up on Ms Richard Guerrero. Noted on requiring high flow oxygen. Seen through window due to COVID 19 isolation. At time of our visit around 1100 she appeared calm, eyes open moving in bed. Discussed with BSRN no issues at that time. Poor po. Spoke with daughter Shannon Pichardo, and son Jesusita Jalloh via phone. Updated on clinical condition as of 1100. Re addressed goals of care discussed with both Sara and Jimmy DNR/DNI. Ms Fani Winkler and Ms Bartolome Alvarez NP were witnesses to conversation. Plan on video conference with family at 9:30 tomorrow. We also shared with daughter, with advanced age and COVID 23 clinical condition can be very fluid, meaning her condition can change rapidly. Family is very hopeful and prayerful she will recover. Goals of care DNR/DNI continue current medical treatment. Please note at time of our call to the family not aware of code stroke being called. Attending and BSRN aware of code change. 7/24/2020 follow up along with Ms Raymundo Maxwell RN. To preserve PPE patient evaluated via video monitor. She is calm. Spoke with RN calm for the most part. She remains confused. Spoke with another [place] daughter Kelly Flannery. Ms Amalia Flynn also shared the call with her friend Dora Goldstein who is palliative RN. We again addressed goals of care burdens of resuscitation, with advanced age  And the effect on her quality of life.  All very understanding of information and they are continuing discussions which we highly encouraged. We have also shared with the family the possibility she may not return to her baseline level of functioning or cognition. Goals of care full code with full interventions. Goals of care: 
    Saw patient today, she is awake, alert, moving all extremities spontaneously and to purpose. She is trying to remove medical equipments and monitors which are attached to her. She appears to be confused. She is not in any distress. Called Antionette Lowery, son of patient when unable to contact daughter, Ronal Barnett. Antionette Lowery said to talk to his sister, Ronal Barnett about patient. We were able to talk to Ronal Barnett and patient's granddaughter today. They said that patient is very active and independent with her ADL prior to this admission. They denied noticing confusion or any other mental issues with patient. They said that patient is mentally alert and very capable of deciding for herself prior to admission. They also said patient is able to walk and goes to the grocery store. Granddaughter said patient and her family has not had any discussion about AMD and goals of care. Discussed the benefits and burdens of goals of care with the granddaughter and with Ms. Ronal Barnett. Social: has multiple recent deaths in the family ( son, nephew); one of her son is sick and in the hospital, per granddaughter. 2. Non-ST elevation MI: 
    Patient is on telemetry monitor and on IV Heparin 3. Multifocal pneumonia:  
    Patient is on O2 at 4 liters via NC with O2 saturation above 90%. She has no shortness of breath. 4. COVID-19 infection: 
    Rapid COVID-19 test on 7/21/2020 was detected. TREATMENT PREFERENCES:  
Code Status: DNR Advance Care Planning: 
[] The [facility] Interdisciplinary Team has updated the ACP Navigator with [address] and Patient Capacity Primary Decision Maker (Legal next of kin): Has no MPOA.  Pt does not have an Advance Directive on file in EMR and is not able to complete one currently. Legal Next of Kin would remain as the medical decision maker at this time since Ms. Demetra Cerna is confused. Pt is a  and has two living adult children. Health care decision making will fall to a consensus of Jayy Meyer (daughter) and Boris Gutierrez(son) Call placed to Borisjem Jay, he requested we contact his sister Bianca Amaro she is the caregiver and makes all the decisions. Calvin Daugherty, daughter - home number: [phone], mobile number: [phone], e-mail: [email or web address]. Medical Interventions: Limited additional interventions Other: As far as possible, the palliative care team has discussed with patient / health care proxy about goals of care / treatment preferences for patient. HISTORY:  
 
History obtained from: Chart, Daughter, Granddaughter CHIEF COMPLAINT: NSTEMI, COVID - 19 infection HPI/SUBJECTIVE: The patient is:  
[] Verbal and participatory [x] Non-participatory due to: confusion Clinical Pain Assessment (nonverbal scale for nonverbal patients):  
Patient doesn't appear to be having pain. She is focused on removing the medical equipments and monitors attached to her. Activity (Movement): Seeking attention through movement or slow, cautious movement Duration: for how long has pt been experiencing pain (e.g., 2 days, 1 month, years) Frequency: how often pain is an issue (e.g., several times per day, once every few days, constant) FUNCTIONAL ASSESSMENT:  
 
Palliative Performance Scale (PPS): PPS: 30 
 
ECOG 
ECOG Status : Completely disabled PSYCHOSOCIAL/SPIRITUAL SCREENING:  
  
Any spiritual / Advent concerns: unable to assess 
[] Yes /  [] No 
 
Caregiver Burnout: 
[] Yes /  [] No /  [x] No Caregiver Present Anticipatory grief assessment: unable to assess 
[] Normal  / [] Maladaptive  REVIEW OF SYSTEMS:  
 
 Positive and pertinent negative findings in ROS are noted above in HPI. The following systems were [x] reviewed / [] unable to be reviewed as noted in HPI Other findings are noted below. Systems: constitutional, ears/nose/mouth/throat, respiratory, gastrointestinal, genitourinary, musculoskeletal, integumentary, neurologic, psychiatric, endocrine. Positive findings noted below. Modified ESAS Completed by: provider Fatigue: 0 Drowsiness: 0 Depression: 0 Pain: 0 Anxiety: 3 Dyspnea: 0 Stool Occurrence(s): 0 PHYSICAL EXAM:  
 
Wt Readings from Last 3 Encounters:  
20 77.1 kg (170 lb)  
17 82.6 kg (182 lb)  
16 83 kg (183 lb) Blood pressure 180/66, pulse 65, temperature 97.5 °F (36.4 °C), resp. rate 14, height 5' 6\" (1.676 m), weight 77.1 kg (170 lb), SpO2 95 %. Pain: 
Pain Scale 1: Adult Nonverbal Pain Scale Pain Intensity 1: 0 Pain Intervention(s) 1: Medication (see MAR) Last bowel movement: none recorded To preserve PPE as patient is COVID + PE is limited Constitutional: appears agitated calling for her  sister Respiratory: breathing not labored, on nasal cannula at 8 liters HISTORY:  
 
Principal Problem: 
  Pneumonia due to 2019 novel coronavirus (2020) Active Problems: 
  NSTEMI (non-ST elevated myocardial infarction) ([facility] [address] (2020) PNA (pneumonia) (2020) Goals of care, counseling/discussion () COVID-19 () Past Medical History:  
Diagnosis Date  Essential hypertension, benign  Mitral valve disorders(424.0) Moderate MR  
 Other and unspecified hyperlipidemia  Other specified cardiac dysrhythmias(427.89) Non-sustained VT on Holter in past  
 Type II or unspecified type diabetes mellitus without mention of complication, not stated as uncontrolled History reviewed. No pertinent surgical history. Family History Problem Relation Age of Onset  Heart Disease Other Positive family history of ischemic heart disease. History reviewed, no pertinent family history. Social History Tobacco Use  Smoking status: Never Smoker  Smokeless tobacco: Never Used Substance Use Topics  Alcohol use: No  
 
Allergies Allergen Reactions  Minoxidil Other (comments) edema Current Facility-Administered Medications Medication Dose Route Frequency  doxazosin (CARDURA) tablet 8 mg  8 mg Oral BID  hydrALAZINE (APRESOLINE) tablet 25 mg  25 mg Oral TID  piperacillin-tazobactam (ZOSYN) 2.25 g in 0.9% sodium chloride (MBP/ADV) 50 mL MBP  2.25 g IntraVENous Q6H  
 enoxaparin (LOVENOX) injection 70 mg  70 mg SubCUTAneous Q24H  hydrALAZINE (APRESOLINE) 20 mg/mL injection 20 mg  20 mg IntraVENous Q6H PRN  
 cloNIDine (CATAPRES) 0.1 mg/24 hr patch 1 Patch  1 Patch TransDERmal every Monday  pantoprazole (PROTONIX) tablet 40 mg  40 mg Oral ACB  LORazepam (ATIVAN) injection 0.5 mg  0.5 mg IntraVENous Q6H PRN  
 acetaminophen (TYLENOL) tablet 650 mg  650 mg Oral Q6H PRN  
 amLODIPine (NORVASC) tablet 10 mg  10 mg Oral DAILY  methylPREDNISolone (PF) (SOLU-MEDROL) injection 40 mg  40 mg IntraVENous Q12H  aspirin chewable tablet 81 mg  81 mg Oral DAILY  atorvastatin (LIPITOR) tablet 40 mg  40 mg Oral DAILY  insulin glargine (LANTUS) injection 10 Units  10 Units SubCUTAneous QHS  insulin lispro (HUMALOG) injection   SubCUTAneous AC&HS  
 glucose chewable tablet 16 g  4 Tab Oral PRN  
 glucagon (GLUCAGEN) injection 1 mg  1 mg IntraMUSCular PRN  
 dextrose (D50W) injection syrg 12.5-25 g  25-50 mL IntraVENous PRN  zinc sulfate (ZINCATE) 220 (50) mg capsule 1 Cap  1 Cap Oral DAILY  cholecalciferol (VITAMIN D3) capsule 5,000 Units  5,000 Units Oral DAILY  ascorbic acid (vitamin C) (VITAMIN C) tablet 1,000 mg  1,000 mg Oral DAILY  melatonin tablet 3 mg  3 mg Oral QHS PRN  
  sodium chloride (NS) flush 5-40 mL  5-40 mL IntraVENous Q8H  
 sodium chloride (NS) flush 5-40 mL  5-40 mL IntraVENous PRN  
 
 
 LAB AND IMAGING FINDINGS:  
 
Lab Results Component Value Date/Time WBC 11.9 07/27/2020 02:09 AM  
 HGB 11.7 (L) 07/27/2020 02:09 AM  
 PLATELET 886 33/77/6052 02:09 AM  
 
Lab Results Component Value Date/Time Sodium 149 (H) 07/28/2020 03:14 AM  
 Potassium 4.1 07/28/2020 03:14 AM  
 Chloride 118 (H) 07/28/2020 03:14 AM  
 CO2 26 07/28/2020 03:14 AM  
 BUN 54 (H) 07/28/2020 03:14 AM  
 Creatinine 1.95 (H) 07/28/2020 03:14 AM  
 Calcium 9.4 07/28/2020 03:14 AM  
 Magnesium 2.1 07/23/2020 05:29 AM  
  
Lab Results Component Value Date/Time Alk. phosphatase 64 07/26/2020 04:31 AM  
 Protein, total 6.7 07/26/2020 04:31 AM  
 Albumin 2.6 (L) 07/26/2020 04:31 AM  
 Globulin 4.1 (H) 07/26/2020 04:31 AM  
 
Lab Results Component Value Date/Time INR 1.4 (H) 07/23/2020 05:29 AM  
 Prothrombin time 16.8 (H) 07/23/2020 05:29 AM  
 aPTT 27.5 07/28/2020 03:14 AM  
  
Lab Results Component Value Date/Time Ferritin 308 07/28/2020 03:14 AM  
  
No results found for: PH, PCO2, PO2 No components found for: [place] Lab Results Component Value Date/Time  07/21/2020 02:03 AM  
 CK - MB 4.6 (H) 07/19/2020 08:10 PM  
  
 
   
 
Total time: 35 minutes Counseling / coordination time, spent as noted above: 30 minutes 
> 50% counseling / coordination: Yes with grand Daylin Avendano  daughter Valeria Beltran Prolonged service was provided for  []30 min   []75 min in face to face time in the presence of the patient, spent as noted above. Time Start:  
Time End:  
Note: this can only be billed with 39829 (initial) or 08380 (follow up). If multiple start / stop times, list each separately.

## 2020-07-28 NOTE — PROGRESS NOTES
Infectious Disease progress Note Reason: COVID-19 pneumonia Current abx Prior abx  
azithromycin since 7/21/20 Solumedrol since 7/22 Ceftriaxone 7/21-7/24 Lines:  
 
 
Assessment : 
 
89 with a hx of HTN, MV disorder, hyperlipidemia, and DM type 2 who presented to the ED on 7/21/20 by her daughter because she was sleeping too much. Labs on 7/23-ferritin 656, CRP 8.6, procalcitonin 0.17, d-dimer 3.99 Labs on 7/24-ferritin 603, CRP 5, procalcitonin 0.25, d-dimer 2.15 Labs on 7/27-ferritin 408, CRP 1.5, procalcitonin 0.2, d-dimer 0.90 
 labs on 7/28-ferritin 308, CRP 1.1, procalcitonin 0.1, d-dimer 1.22 Positive rapid COVID-19 test 7/21/2020 CT chest 7/19, cxr 7/19- Extensive patchy multifocal airspace disease Clinical presentation consistent with COVID-19 pneumonia (confirmed). Altered mental status on admission likely metabolic encephalopathy from COVID-19 infection Elevated D-dimer concerning for hypercoagulable state secondary to COVID-19 infection. Improved hypoxia. Improved mentation. CT head negative for acute stroke. Waxing and waning mental status may be secondary to delirium Recommendations: 1.  cont piperacillin/tazobactam to cover for possible aspiration pneumonia 2. Continue Solumedrol 3. Anticoagulation per primary team 
4. Monitor inflammatory markers daily-CRP, ferritin, procalcitonin, d-dimer 5. Titrate oxygen as tolerated 6. Aspiration precaution Above plan was discussed in details with RN and dr Venita Greco. Will continue to participate in the care of this patient. HPI: 
 
 
Patient was confused at the time of my evaluation. She did not answer any questions. Detailed review of system not feasible. 
 
 
home Medication List  
 Details  
doxazosin (CARDURA) 8 mg tablet Take 8 mg by mouth nightly. indapamide (LOZOL) 1.25 mg tablet Take  by mouth daily. escitalopram oxalate (LEXAPRO) 10 mg tablet Take 10 mg by mouth daily. losartan (COZAAR) 100 mg tablet Take 100 mg by mouth daily. hydrALAZINE (APRESOLINE) 50 mg tablet Take 50 mg by mouth three (3) times daily. atorvastatin (LIPITOR) 40 mg tablet Take 1 Tab by mouth daily. Qty: 90 Tab, Refills: 3  
  
ergocalciferol (ERGOCALCIFEROL) 50,000 unit capsule Take 50,000 Units by mouth every seven (7) days. atenolol (TENORMIN) 100 mg tablet Take 100 mg by mouth two (2) times a day. insulin glargine (LANTUS SOLOSTAR) 100 unit/mL (3 mL) pen 10 Units by SubCUTAneous route daily. insulin aspart (NOVOLOG) 100 unit/mL injection 6 Units by SubCUTAneous route Before breakfast, lunch, and dinner. aspirin 81 mg tablet Take 81 mg by mouth.  
  
sitaGLIPtin (JANUVIA) 100 mg tablet Take 100 mg by mouth daily. Current Facility-Administered Medications Medication Dose Route Frequency  doxazosin (CARDURA) tablet 8 mg  8 mg Oral BID  hydrALAZINE (APRESOLINE) tablet 25 mg  25 mg Oral TID  piperacillin-tazobactam (ZOSYN) 2.25 g in 0.9% sodium chloride (MBP/ADV) 50 mL MBP  2.25 g IntraVENous Q6H  
 enoxaparin (LOVENOX) injection 70 mg  70 mg SubCUTAneous Q24H  hydrALAZINE (APRESOLINE) 20 mg/mL injection 20 mg  20 mg IntraVENous Q6H PRN  
 cloNIDine (CATAPRES) 0.1 mg/24 hr patch 1 Patch  1 Patch TransDERmal every Monday  pantoprazole (PROTONIX) tablet 40 mg  40 mg Oral ACB  LORazepam (ATIVAN) injection 0.5 mg  0.5 mg IntraVENous Q6H PRN  
 acetaminophen (TYLENOL) tablet 650 mg  650 mg Oral Q6H PRN  
 amLODIPine (NORVASC) tablet 10 mg  10 mg Oral DAILY  methylPREDNISolone (PF) (SOLU-MEDROL) injection 40 mg  40 mg IntraVENous Q12H  aspirin chewable tablet 81 mg  81 mg Oral DAILY  atorvastatin (LIPITOR) tablet 40 mg  40 mg Oral DAILY  insulin glargine (LANTUS) injection 10 Units  10 Units SubCUTAneous QHS  insulin lispro (HUMALOG) injection   SubCUTAneous AC&HS  
 glucose chewable tablet 16 g  4 Tab Oral PRN  
  glucagon (GLUCAGEN) injection 1 mg  1 mg IntraMUSCular PRN  
 dextrose (D50W) injection syrg 12.5-25 g  25-50 mL IntraVENous PRN  zinc sulfate (ZINCATE) 220 (50) mg capsule 1 Cap  1 Cap Oral DAILY  cholecalciferol (VITAMIN D3) capsule 5,000 Units  5,000 Units Oral DAILY  ascorbic acid (vitamin C) (VITAMIN C) tablet 1,000 mg  1,000 mg Oral DAILY  melatonin tablet 3 mg  3 mg Oral QHS PRN  
 sodium chloride (NS) flush 5-40 mL  5-40 mL IntraVENous Q8H  
 sodium chloride (NS) flush 5-40 mL  5-40 mL IntraVENous PRN Allergies: Minoxidil Temp (24hrs), Av °F (36.1 °C), Min:96.8 °F (36 °C), Max:97.3 °F (36.3 °C) Visit Vitals /60 Pulse 64 Temp 96.8 °F (36 °C) Resp 18 Ht 5' 6\" (1.676 m) Wt 77.1 kg (170 lb) SpO2 98% BMI 27.44 kg/m² ROS: Unable to obtain Physical Exam: 
 
General:   laying on the bed, appears more alert than yesterday. Not communicative. Able to eat with assistance Skin:   no rashes or skin lesions noted on limited exam  
HEENT:  Normocephalic, atraumatic, PERRL, EOMI, no scleral icterus or pallor; no conjunctival hemmohage;     Neck supple, no bruits. Lungs:   non-labored, bilaterally clear to aspiration- no crackles wheezes rales or rhonchi Heart:  RRR, s1 and s2; no  rubs or gallops, no edema, + pedal pulses Abdomen:  soft, non-distended,  Non-tender Genitourinary:  deferred Extremities:   no clubbing, cyanosis; no joint effusions or swelling;  no erythema of LE Neurologic:  Moves all 4 extremities Psychiatric:   unable to assess Labs: Results:  
Chemistry Recent Labs  
  20 
7928 20 
1751 20 
0431 * 193* 147* * 148* 144  
K 4.1 4.0 3.9 * 117* 113* CO2 26 24 24 BUN 54* 53* 54* CREA 1.95* 1.62* 1.54* CA 9.4 9.3 9.3 AGAP 5 7 7 BUCR 28* 33* 35* AP  --   --  64  
TP  --   --  6.7 ALB  --   --  2.6*  
GLOB  --   --  4.1* AGRAT  --   --  0.6*  
  
 CBC w/Diff Recent Labs  
  07/27/20 
0209 WBC 11.9 RBC 3.98* HGB 11.7* HCT 35.7  GRANS 87* LYMPH 6*  
EOS 0 Microbiology No results for input(s): CULT in the last 72 hours. RADIOLOGY: 
 
All available imaging studies/reports in [facility] for this admission were reviewed Total time spent >35 minutes. High complexity decision making was performed during the evaluation of this patient at high risk for decompensation with multiple organ involvement Above mentioned total time spent on reviewing the case/medical record/data/notes/EMR/patient examination/documentation/coordinating care with nurse/consultants, exclusive of procedures with complex decision making performed and > 50% time spent in face to face evaluation. Dr. Yahir Sampson, Infectious Disease Specialist 
[phone] July 28, 2020 
10:28 AM

## 2020-07-28 NOTE — PROGRESS NOTES
[facility] Hospitalist Group Progress Note Patient: Luís Montoya Age: 80 y.o. : 2/15/1929 MR#: [id number] SSN: [id number] Date: 2020 Subjective/24-hour events: No significant change in pt condition Unarousable to voice, withdraws to pain Slightly agitated, makes incomprehensible sounds No to very little PO intake Assessment:  
 
1. COVID-19 infection with COVID-19 pneumonia 2. Acute hypoxic respiratory failure 3. NSTEMI 4. ZION on CKD stage III- improved 5. Acute metabolic encephalopathy/delirium 6. Sinus bradycardia 7. Mild acute diastolic CHF 8. Type II DM 
9. Hypertension 10. Abnormal cognition, suspect underlying dementia 11. Advanced age Plan: 
 
Palliative care, cardiology and ID following CT head  negative for acute changes Cont IV abx per ID- zosyn Cont and wean solu-medrol, cont protonix Cont ASA, statin Cont clonidine patch, BB dc'ed Cont ativan prn for agitation Vitamin and mineral supplementation as ordered. Cont lantus and SSI w/accuchecks FU inflammatory markers PT, OT Aspiration precautions Palliative following with family and continuing discussions on [address] Discussed pt care, A&P, and very poor prognosis with family who was understanding but at this point would like aggressive care and is hopeful she will return home in previous functional condition Case discussed with:  [x]Patient  []Family  [x]Nursing  []Case Management DVT Prophylaxis:  []Lovenox  []Hep SQ  []SCDs  []Coumadin   [x]On Heparin gtt Diet: Cardiac, modified Code Status: DNR Contact: Army Talbot (daughter)   [phone] Disposition: Continue current care; > 2 nights H. Bisi Manley DO  
2020 Objective:  
VS:  
Visit Vitals /66 (BP 1 Location: Right arm, BP Patient Position: At rest) Pulse 65 Temp 97.5 °F (36.4 °C) Resp 14 Ht 5' 6\" (1.676 m) Wt 77.1 kg (170 lb) SpO2 95% BMI 27.44 kg/m² Tmax/24hrs: Temp (24hrs), Av.5 °F (36.4 °C), Min:96.8 °F (36 °C), Max:98.7 °F (37.1 °C) No intake or output data in the 24 hours ending 20 General: Nontoxic appearing, makes incomprehensible sounds, somnolent, lethargic, unresponsive today Cardiovascular: RRR Pulmonary: Accessory muscle use. GI:  Abdomen soft, nontender. Extremities: Warm, no edema or ischemia. Neuro: Somnolent, unresponsive today Labs:   
Recent Results (from the past 24 hour(s)) GLUCOSE, POC Collection Time: 20 10:15 PM  
Result Value Ref Range Glucose (POC) 154 (H) 70 - 110 mg/dL PTT Collection Time: 20  3:14 AM  
Result Value Ref Range aPTT 27.5 23.0 - 36.4 SEC FERRITIN Collection Time: 20  3:14 AM  
Result Value Ref Range Ferritin 308 8 - 388 NG/ML  
C REACTIVE PROTEIN, QT Collection Time: 20  3:14 AM  
Result Value Ref Range C-Reactive protein 1.1 (H) 0 - 0.3 mg/dL PROCALCITONIN Collection Time: 20  3:14 AM  
Result Value Ref Range Procalcitonin 0.16 ng/mL D DIMER Collection Time: 20  3:14 AM  
Result Value Ref Range D DIMER 1.22 (H) <0.46 ug/ml(FEU) METABOLIC PANEL, BASIC Collection Time: 20  3:14 AM  
Result Value Ref Range Sodium 149 (H) 136 - 145 mmol/L Potassium 4.1 3.5 - 5.5 mmol/L Chloride 118 (H) 100 - 111 mmol/L  
 CO2 26 21 - 32 mmol/L Anion gap 5 3.0 - 18 mmol/L Glucose 181 (H) 74 - 99 mg/dL BUN 54 (H) 7.0 - 18 MG/DL Creatinine 1.95 (H) 0.6 - 1.3 MG/DL  
 BUN/Creatinine ratio 28 (H) 12 - 20 GFR est AA 29 (L) >60 ml/min/1.73m2 GFR est non-AA 24 (L) >60 ml/min/1.73m2 Calcium 9.4 8.5 - 10.1 MG/DL  
GLUCOSE, POC Collection Time: 20  8:32 AM  
Result Value Ref Range Glucose (POC) 195 (H) 70 - 110 mg/dL GLUCOSE, POC Collection Time: 20 11:47 AM  
Result Value Ref Range Glucose (POC) 201 (H) 70 - 110 mg/dL GLUCOSE, POC  
 Collection Time: 07/28/20  4:09 PM  
Result Value Ref Range Glucose (POC) 208 (H) 70 - 110 mg/dL

## 2020-07-28 NOTE — ROUTINE PROCESS
Verbal shift change report given to July IZAGUIRRE (oncoming nurse) by Isreal Romberg (offgoing nurse). Report included the following information SBAR, Kardex, MAR, Recent Results, Med Rec Status and Alarm Parameters .

## 2020-07-28 NOTE — ROUTINE PROCESS
Bedside and Verbal shift change report given to [address] (oncoming nurse) by Reena De Jesus (offgoing nurse). Report included the following information SBAR, Kardex, Intake/Output and MAR.

## 2020-07-28 NOTE — PROGRESS NOTES
Nutrition Brief: MST received. Pt triggered unsure of recent wt loss and unsure how much. Noted full RDN assessment yesterday on 7/27. Will follow up per policy.  
 
Yolis Desai, PARDEEPN

## 2020-07-28 NOTE — CDMP QUERY
Patient admitted with COVID-19. Progress note dated 7/19 by Dr Junior Petit states Trinity Bowser with a creatinine of 2.4\"; however, ZION has not been documented on subsequent notes. If possible, please document in the progress notes and discharge summary if ZION was: 
 
? ZION ruled out after study (please provide corresponding diagnosis for patients clinical picture and treatment) ? ZION on CKD 3 
? ZION without CKD The medical record reflects the following: 
 
Risk Factors: 80years of age, [id number], NSTEMI, PNA Clinical Indicators:  
- current Creat: 2.20,  1.62 ;  GFR: 25,  36 
- previous labs dated 5/17/15 - Creat 1.50 ; GFR 42 
- previous labs dated 5/29/11 - creat 1.4 ; GFR 46 Treatment: receiving serial BMP Please call me for any questions Thank you, [id number] [facility], [facility] 
[phone] Reference:    CKD Stages / [employer] Stage 1:  GFR = > 90 ml/min Stage 2:  GFR = 60 to 89 Stage 3:  GFR = 30 to 59 Stage 4:  GFR = 15 to 29 Stage 5:  GFR = < 15

## 2020-07-28 NOTE — PROGRESS NOTES
[facility] 
 
 
CARDIOLOGY PROGRESS NOTE 
RECS: 
 
 
 
1. NSTEMI- positive troponin. Now down trending. ekg with ST-T wave abnormality in inferior and lateral leads. Continue medical management. Asa.statin. Not a candidate for any invasive cardiac work-up 2. Intermittent bradycardia-discontinued BB 3. Elevated D-dimer- on Lovenox sq daily. 4. COV-19 - rapid test 7/21/20 detected- being managed by medical team  
5. Hypertension-improving this morning. Continue present medications. 6. Hx of nonsustained ventricular tachycardia- no recurrence on this admission will monitor on telemetry 7. Hyperlipidemia- continue statin 8. Type 2 diabetes mellitus without complication- medication per medical team                                   
9. 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 AMS- worsening. Poor prognosis Continue supportive care ASSESSMENT: 
Hospital Problems  Date Reviewed: 1/26/2017 Codes Class Noted POA * (Principal) Pneumonia due to 2019 novel coronavirus ICD-10-CM: U07.1, J12.89 ICD-9-CM: 480.8  7/26/2020 Unknown Goals of care, counseling/discussion ICD-10-CM: Z71.89 ICD-9-CM: V65.49  Unknown Unknown COVID-19 ICD-10-CM: U07.1 ICD-9-CM: 079.89  Unknown Unknown PNA (pneumonia) ICD-10-CM: J18.9 ICD-9-CM: 196  7/21/2020 Unknown NSTEMI (non-ST elevated myocardial infarction) ([facility] [address] ICD-10-CM: I21.4 ICD-9-CM: 410.70  7/20/2020 Unknown SUBJECTIVE: 
Confused per nursing staff No respiratory distress. No edema OBJECTIVE: 
 
VS:  
Visit Vitals /60 Pulse 64 Temp 96.8 °F (36 °C) Resp 18 Ht 5' 6\" (1.676 m) Wt 77.1 kg (170 lb) SpO2 98% BMI 27.44 kg/m² Intake/Output Summary (Last 24 hours) at 7/28/2020 1114 Last data filed at 7/27/2020 1635 Gross per 24 hour Intake 200 ml Output  Net 200 ml TELE: NSR 
 
 Limited physical exam to preserve PPE. Spoke with nursing staff patient is confused and unresponsive to verbal commands PULM:  not using accessory muscles on O2 by NC Heart  NSR sinus bradycardia HR 48's EXT: no edema visible SKIN: no acute rash on limited visible skin exam 
 
 
Labs: Results:  
   
Chemistry Recent Labs  
  07/28/20 0314 07/27/20 0209 07/26/20 0431 * 193* 147* * 148* 144  
K 4.1 4.0 3.9 * 117* 113* CO2 26 24 24 BUN 54* 53* 54* CREA 1.95* 1.62* 1.54* CA 9.4 9.3 9.3 AGAP 5 7 7 BUCR 28* 33* 35* AP  --   --  64  
TP  --   --  6.7 ALB  --   --  2.6*  
GLOB  --   --  4.1* AGRAT  --   --  0.6* CBC w/Diff Recent Labs  
  07/27/20 0209 WBC 11.9 RBC 3.98* HGB 11.7* HCT 35.7  GRANS 87* LYMPH 6*  
EOS 0 Cardiac Enzymes No results for input(s): CPK, CKND1, BETSEY in the last 72 hours. No lab exists for component: Hakeem Hollis Coagulation Recent Labs  
  07/28/20 0314 07/27/20 0209 APTT 27.5 28.5 Lipid Panel Lab Results Component Value Date/Time Cholesterol, total 108 07/22/2020 11:41 AM  
 HDL Cholesterol 34 (L) 07/22/2020 11:41 AM  
 LDL, calculated 43.8 07/22/2020 11:41 AM  
 VLDL, calculated 30.2 07/22/2020 11:41 AM  
 Triglyceride 151 (H) 07/22/2020 11:41 AM  
 CHOL/HDL Ratio 3.2 07/22/2020 11:41 AM  
  
BNP No results for input(s): BNPP in the last 72 hours. Liver Enzymes Recent Labs  
  07/26/20 0431 TP 6.7 ALB 2.6* AP 64 Thyroid Studies No results found for: T4, T3U, TSH, TSHEXT, TSHEXT Nahomi Pool MD

## 2020-07-28 NOTE — PROGRESS NOTES
Pt generated mews score of 3 related to LOC of agitation and confusion at 1507. Pt was repositioned and incontinence care performed again as well as range of motion performed on bilateral wrists. Will continue to monitor pt.

## 2020-07-29 NOTE — PROGRESS NOTES
Discharge planning Chart reviewed. Palliative following. CM will continue to assist with transitional needs. ALIDA Montano, RN Pager # [phone] Care Manager

## 2020-07-29 NOTE — PROGRESS NOTES
Problem: Dysphagia (Adult) Goal: *Acute Goals and Plan of Care (Insert Text) Description: Patient will: 1. Tolerate PO trials with 0 s/s overt distress in 4/5 trials 2. Participate in training and education related to continued aspiration risk, diet recs and compensatory strategies Recommend:  
Puree, thin liquids Feeding assistance/encouragement Meds in puree Aspiration precautions HOB >45 degrees during all intake and for at least 30 min after po Small bites/sips, Slow rate of intake, Alternating bites/sips Oral care post meals Outcome: Not Progressing Towards Goal 
 
SPEECH LANGUAGE PATHOLOGY DYSPHAGIA TREATMENT Patient: Phyllis Gosselin (69 y.o. female) Date: 7/29/2020 Diagnosis: NSTEMI (non-ST elevated myocardial infarction) ([facility] [address] [I21.4] Pneumonia due to 2019 novel coronavirus Precautions:  Aspiration, Skin, Fall, Contact, Other (comment)(droplet plus) PLOF: As per H&P   
 
ASSESSMENT: 
Pt seen for follow up dysphagia tx, agitated and minimally participatory. Pt continues to demo poor intake (4 bites at meal yesterday per RN). Pt with minimal bolus acceptance, improved with puree vs solids. Pt tolerating consecutive swallows of thin liquid + straw with no overt s/sx aspiration. Pt requiring max encouragement for ~2 bites of puree and x2 sips of thin liquids. Will attempt diet downgrade to puree to encourage intake and continue to follow as indicated. Discussed with pt, RN and RD. Progression toward goals: 
[]         Improving appropriately and progressing toward goals 
[]         Improving slowly and progressing toward goals [x]         Not making progress toward goals and plan of care will be adjusted PLAN: 
Recommendations and Planned Interventions: 
Patient continues to benefit from skilled intervention to address the above impairments. Continue treatment per established plan of care. Discharge Recommendations: To Be Determined SUBJECTIVE:  
 Patient moaning/agitated; nonverbal 
 
OBJECTIVE:  
Cognitive and Communication Status: 
Neurologic State: Confused, Agitated Orientation Level: Disoriented X4 Cognition: Decreased attention/concentration, Decreased command following Safety/Judgement: Decreased insight into deficits, Decreased awareness of need for assistance Dysphagia Treatment: 
Oral Assessment: 
Oral Assessment Labial: No impairment Dentition: Intact Oral Hygiene: Good Lingual: No impairment Velum: No impairment Mandible: No impairment P.O. Trials: 
 Patient Position: 45 at [facility] Vocal quality prior to P.O.: Low volume Consistency Presented: Thin liquid, Puree, Mech soft How Presented: SLP-fed/presented, Straw, Successive swallows, Spoon Bolus Acceptance: No impairment Bolus Formation/Control: Impaired Type of Impairment: Mastication, Delayed Propulsion: Delayed (# of seconds) Oral Residue: ~20% of bolus with mech soft Initiation of Swallow: No impairment Laryngeal Elevation: Functional 
 Aspiration Signs/Symptoms: None Pharyngeal Phase Characteristics: Poor endurance Effective Modifications: Small sips and bites, Alternate liquids/solids Cues for Modifications: Maximal 
 Oral Phase Severity: Moderate Pharyngeal Phase Severity : Mild PAIN: 
Start of Tx: unable to report End of Tx: unable to report After treatment:  
[]              Patient left in no apparent distress sitting up in chair 
[x]              Patient left in no apparent distress in bed 
[x]              Call bell left within reach [x]              Nursing notified 
[]              Family present 
[]              Caregiver present 
[]              Bed alarm activated COMMUNICATION/EDUCATION:  
[x] Aspiration precautions; swallow safety; compensatory techniques []        Patient/family able to participate in training and education  
[x]  Patient unable to participate in training and education, education ongoing with staff [] Patient understands goals and intent of therapy; neutral about participation Myrna Ramirez M.S., CCC-SLP Speech-Language Pathologist

## 2020-07-29 NOTE — PROGRESS NOTES
Infectious Disease progress Note Reason: COVID-19 pneumonia Current abx Prior abx Solumedrol since 7/22 Pip/tazo since 7/27 Ceftriaxone 7/21-7/24 
azithromycin since 7/21/20-7/27/20 Lines:  
 
 
Assessment : 
 
89 with a hx of HTN, MV disorder, hyperlipidemia, and DM type 2 who presented to the ED on 7/21/20 by her daughter because she was sleeping too much. Labs on 7/23-ferritin 656, CRP 8.6, procalcitonin 0.17, d-dimer 3.99 Labs on 7/24-ferritin 603, CRP 5, procalcitonin 0.25, d-dimer 2.15 Labs on 7/27-ferritin 408, CRP 1.5, procalcitonin 0.2, d-dimer 0.90 
 labs on 7/28-ferritin 308, CRP 1.1, procalcitonin 0.1, d-dimer 1.22 Labs on 7/29-ferritin 290, CRP 0.8, procalcitonin less than 0.05, d-dimer 1.0 Positive rapid COVID-19 test 7/21/2020 CT chest 7/19, cxr 7/19- Extensive patchy multifocal airspace disease Clinical presentation consistent with COVID-19 pneumonia (confirmed). Altered mental status on admission likely metabolic encephalopathy from COVID-19 infection Elevated D-dimer concerning for hypercoagulable state secondary to COVID-19 infection. Improved hypoxia. Improved mentation. CT head negative for acute stroke. Waxing and waning mental status may be secondary to delirium Recommendations: 1.  cont piperacillin/tazobactam to cover for possible aspiration pneumonia 2. Continue Solumedrol- decrease dose to 40 mg IV q 24 hour 3. Anticoagulation per primary team 
4. Monitor inflammatory markers daily-CRP, ferritin, procalcitonin, d-dimer 5. Titrate oxygen as tolerated 6. Aspiration precaution Above plan was discussed in details with RN and dr Cezar Wang. Will continue to participate in the care of this patient. HPI: 
 
 
Patient was confused at the time of my evaluation. She did not answer any questions. Detailed review of system not feasible. 
 
 
home Medication List  
 Details doxazosin (CARDURA) 8 mg tablet Take 8 mg by mouth nightly. indapamide (LOZOL) 1.25 mg tablet Take  by mouth daily. escitalopram oxalate (LEXAPRO) 10 mg tablet Take 10 mg by mouth daily. losartan (COZAAR) 100 mg tablet Take 100 mg by mouth daily. hydrALAZINE (APRESOLINE) 50 mg tablet Take 50 mg by mouth three (3) times daily. atorvastatin (LIPITOR) 40 mg tablet Take 1 Tab by mouth daily. Qty: 90 Tab, Refills: 3  
  
ergocalciferol (ERGOCALCIFEROL) 50,000 unit capsule Take 50,000 Units by mouth every seven (7) days. atenolol (TENORMIN) 100 mg tablet Take 100 mg by mouth two (2) times a day. insulin glargine (LANTUS SOLOSTAR) 100 unit/mL (3 mL) pen 10 Units by SubCUTAneous route daily. insulin aspart (NOVOLOG) 100 unit/mL injection 6 Units by SubCUTAneous route Before breakfast, lunch, and dinner. aspirin 81 mg tablet Take 81 mg by mouth.  
  
sitaGLIPtin (JANUVIA) 100 mg tablet Take 100 mg by mouth daily. Current Facility-Administered Medications Medication Dose Route Frequency  hydrALAZINE (APRESOLINE) tablet 25 mg  25 mg Oral TID  doxazosin (CARDURA) tablet 8 mg  8 mg Oral BID  
 0.9% sodium chloride infusion  75 mL/hr IntraVENous CONTINUOUS  piperacillin-tazobactam (ZOSYN) 2.25 g in 0.9% sodium chloride (MBP/ADV) 50 mL MBP  2.25 g IntraVENous Q6H  
 enoxaparin (LOVENOX) injection 70 mg  70 mg SubCUTAneous Q24H  hydrALAZINE (APRESOLINE) 20 mg/mL injection 20 mg  20 mg IntraVENous Q6H PRN  
 cloNIDine (CATAPRES) 0.1 mg/24 hr patch 1 Patch  1 Patch TransDERmal every Monday  pantoprazole (PROTONIX) tablet 40 mg  40 mg Oral ACB  LORazepam (ATIVAN) injection 0.5 mg  0.5 mg IntraVENous Q6H PRN  
 acetaminophen (TYLENOL) tablet 650 mg  650 mg Oral Q6H PRN  
 amLODIPine (NORVASC) tablet 10 mg  10 mg Oral DAILY  methylPREDNISolone (PF) (SOLU-MEDROL) injection 40 mg  40 mg IntraVENous Q12H  aspirin chewable tablet 81 mg  81 mg Oral DAILY  atorvastatin (LIPITOR) tablet 40 mg  40 mg Oral DAILY  insulin glargine (LANTUS) injection 10 Units  10 Units SubCUTAneous QHS  insulin lispro (HUMALOG) injection   SubCUTAneous AC&HS  
 glucose chewable tablet 16 g  4 Tab Oral PRN  
 glucagon (GLUCAGEN) injection 1 mg  1 mg IntraMUSCular PRN  
 dextrose (D50W) injection syrg 12.5-25 g  25-50 mL IntraVENous PRN  zinc sulfate (ZINCATE) 220 (50) mg capsule 1 Cap  1 Cap Oral DAILY  cholecalciferol (VITAMIN D3) capsule 5,000 Units  5,000 Units Oral DAILY  ascorbic acid (vitamin C) (VITAMIN C) tablet 1,000 mg  1,000 mg Oral DAILY  melatonin tablet 3 mg  3 mg Oral QHS PRN  
 sodium chloride (NS) flush 5-40 mL  5-40 mL IntraVENous Q8H  
 sodium chloride (NS) flush 5-40 mL  5-40 mL IntraVENous PRN Allergies: Minoxidil Temp (24hrs), Av °F (36.7 °C), Min:97.5 °F (36.4 °C), Max:98.6 °F (37 °C) Visit Vitals /48 (BP 1 Location: Right arm, BP Patient Position: At rest) Pulse (!) 55 Temp 98.6 °F (37 °C) Resp 12 Ht 5' 6\" (1.676 m) Wt 79.9 kg (176 lb 3.2 oz) SpO2 97% BMI 28.44 kg/m² ROS: Unable to obtain Physical Exam: 
 
General:   laying on the bed,  Not communicative Skin:   no rashes or skin lesions noted on limited exam  
HEENT:  Normocephalic, atraumatic, PERRL, EOMI, no scleral icterus or pallor; no conjunctival hemmohage;     Neck supple, no bruits. Lungs:   non-labored, bilaterally clear to aspiration- no crackles wheezes rales or rhonchi Heart:  RRR, s1 and s2; no  rubs or gallops, no edema, + pedal pulses Abdomen:  soft, non-distended,  Non-tender Genitourinary:  deferred Extremities:   no clubbing, cyanosis; no joint effusions or swelling;  no erythema of LE Neurologic:  Moves all 4 extremities Psychiatric:   unable to assess Labs: Results:  
Chemistry Recent Labs  
  20 
1544 20 
0875 20 
0209 * 181* 193* * 149* 148* K 4.0 4.1 4.0  
* 118* 117* CO2 25 26 24 BUN 52* 54* 53* CREA 1.88* 1.95* 1.62* CA 9.4 9.4 9.3 AGAP 4 5 7 BUCR 28* 28* 33* CBC w/Diff Recent Labs  
  07/29/20 
0549 07/27/20 
0209 WBC 12.5 11.9 RBC 3.83* 3.98* HGB 11.4* 11.7* HCT 35.3 35.7  337 GRANS  --  87* LYMPH  --  6*  
EOS  --  0 Microbiology No results for input(s): CULT in the last 72 hours. RADIOLOGY: 
 
All available imaging studies/reports in [facility] for this admission were reviewed Total time spent >35 minutes. High complexity decision making was performed during the evaluation of this patient at high risk for decompensation with multiple organ involvement Above mentioned total time spent on reviewing the case/medical record/data/notes/EMR/patient examination/documentation/coordinating care with nurse/consultants, exclusive of procedures with complex decision making performed and > 50% time spent in face to face evaluation. Dr. J Luis Toth, Infectious Disease Specialist 
[phone] July 29, 2020 
10:28 AM

## 2020-07-29 NOTE — PROGRESS NOTES
Problem: Self Care Deficits Care Plan (Adult) Goal: *Acute Goals and Plan of Care (Insert Text) Description: Occupational Therapy Goals Initiated 7/23/2020 within 7 day(s). 1.  Patient will perform self-feeding with modified independence. 2.  Patient will perform grooming with modified independence. 3.  Patient will perform upper body dressing with minimal assistance/contact guard assist. 
4.  Patient will perform toilet transfers with contact guard assist . 5. Patient will perform all aspects of toileting with contact guard assist. 
6.  Patient will participate in upper extremity therapeutic exercise/activities with supervision/set-up for 8 minutes. Prior Level of Function: Pt is a poor historian, unable to provide PLOF. Per chart review, pt was (I) with basic self-care/ADLs PTA Outcome: Resolved/Not Met OCCUPATIONAL THERAPY TREATMENT/DISCHARGE Patient: Swetha Crespo (75 y.o. female) Date: 7/29/2020 Diagnosis: NSTEMI (non-ST elevated myocardial infarction) ([facility] [address] [I21.4] Pneumonia due to 2019 novel coronavirus Precautions: Fall, Aspiration, Skin, Contact Chart, occupational therapy assessment, plan of care, and goals were reviewed. ASSESSMENT: 
Upon entering the room, the patient was supine in bed bilateral restraints donned, eyes closed and mumbling unintelligibly. Patient did not follow any commands such as open eyes, squeeze hands, bring hand to face but observed moving bilateral elbow/hands. Appreciative of ZINA Arriaga) assisting to reposition and slide patient up in bed. Patient total assist x 2 for task. Patient at this time not appropriate for skilled OT as she demos no command following this session. OT to d/c from caseload. PLAN: 
Patient will be discharged from occupational therapy at this time. Rationale for discharge: 
[] Goals Achieved 
[] Plateau Reached 
[] Patient not participating in therapy [x] Other: Patient not appropriate/does not follow commands Discharge Recommendations:  LTC Further Equipment Recommendations for Discharge:  hospital bed, mechanical lift, and wheelchair SUBJECTIVE:  
Patient mumbling OBJECTIVE DATA SUMMARY:  
Cognitive/Behavioral Status: 
Neurologic State: Confused Orientation Level: Disoriented X4 Cognition: No command following, Poor safety awareness Safety/Judgement: Fall prevention Functional Mobility and Transfers for ADLs: 
 Bed Mobility: 
Scooting: Total assistance;Assist x2(assistance from RN) Balance: ADL Intervention: 
Patient would require max- total assist for all self-care tasks. Cognitive Retraining Safety/Judgement: Fall prevention Pain: 
Pain level pre-treatment: -/10 Pain level post-treatment: -/10 Pain Intervention(s): Medication (see MAR); Response to intervention: Nurse notified, See doc flow Activity Tolerance:   
Poor Please refer to the flowsheet for vital signs taken during this treatment. After treatment:  
[]  Patient left in no apparent distress sitting up in chair 
[x]  Patient left in no apparent distress in bed 
[x]  Call bell left within reach [x]  Nursing notified 
[]  Caregiver present 
[]  Bed alarm activated COMMUNICATION/EDUCATION:  
[x]      Role of Occupational Therapy in the acute care setting 
[x]      Home safety education was provided and the patient/caregiver indicated understanding. [x]      Patient/family have participated as able and agree with findings and recommendations. []      Patient is unable to participate in plan of care at this time. Thank you for allowing me to assist in the care of this patient. Sidney Prasad OTR/L Time Calculation: 8 mins

## 2020-07-29 NOTE — DIABETES MGMT
GLYCEMIC CONTROL PLAN OF CARE Assessment/Recommendations: 
Fasting lab glucose this am 145 mg/dl Continue basal and corrective insulin coverage as ordered. Will continue inpatient monitoring. Most recent blood glucose values: 
 
Results for Yaron Walker (MRN [id number]) as of 7/29/2020 12:58 Ref. Range 7/28/2020 11:47 7/28/2020 16:09 7/28/2020 21:12 7/29/2020 09:42 7/29/2020 11:50  
GLUCOSE,FAST - POC Latest Ref Range: 70 - 110 mg/dL 201 (H) 208 (H) 153 (H) 196 (H) 175 (H) Current A1C of 6.9 % is equivalent to average blood glucose of 151 mg/dl over the past 2-3 months. Current hospital diabetes medications:  
lantus 10 units every bedtime. Lispro corrective insulin coverage AC&HS Previous day's insulin requirements:  
Lantus 10 units Lispro 15 units corrective insulin Diet: DIET CARDIAC Pureed; Consistent Carb 1800kcal 
DIET NUTRITIONAL SUPPLEMENTS Breakfast, Lunch, Dinner; [employer] Education:  ____Refer to Diabetes Education Record  
          _x__Education not indicated at this time Covid-19 positive isolation Slim Perry RN CDE Ext [phone]

## 2020-07-29 NOTE — PROGRESS NOTES
[facility] Hospitalist Group Progress Note Patient: Gene Jones Age: 80 y.o. : 2/15/1929 MR#: [id number] SSN: [id number] Date: 2020 Subjective/24-hour events: No significant change in pt condition Unarousable to voice Nurse mentions that she is not eating Assessment:  
 
1. COVID-19 infection with COVID-19 pneumonia 2. Acute hypoxic respiratory failure 3. NSTEMI 4. ZION on CKD stage III- improved 5. Acute metabolic encephalopathy/delirium 6. Sinus bradycardia 7. Mild acute diastolic CHF 8. Type II DM 
9. Hypertension 10. Abnormal cognition, suspect underlying dementia 11. Advanced age Plan: 
 
Palliative care, cardiology and ID following CT head  negative for acute changes Cont IV abx per ID- zosyn Cont and wean solu-medrol, cont protonix Cont ASA, statin Cont clonidine patch, BB dc'ed Cont ativan prn for agitation Vitamin and mineral supplementation as ordered. Cont lantus and SSI w/accuchecks FU inflammatory markers PT, OT Aspiration precautions Palliative following with family and continuing discussions on [address] Discussed pt care, A&P, and very poor prognosis with family who was understanding but at this point would like aggressive care and is hopeful she will return home in previous functional condition Case discussed with:  [x]Patient  []Family  [x]Nursing  []Case Management DVT Prophylaxis:  []Lovenox  []Hep SQ  []SCDs  []Coumadin   [x]On Heparin gtt Diet: Cardiac, modified Code Status: DNR Contact: Jordan Grandchild (daughter)   [phone] Disposition: Continue current care; > 2 nights Misty Henry MD 
2020 Objective:  
VS:  
Visit Vitals /47 Pulse 88 Temp 98.2 °F (36.8 °C) Resp 12 Ht 5' 6\" (1.676 m) Wt 79.9 kg (176 lb 3.2 oz) SpO2 98% BMI 28.44 kg/m² Tmax/24hrs: Temp (24hrs), Av °F (36.7 °C), Min:97.5 °F (36.4 °C), Max:98.6 °F (37 °C) No intake or output data in the 24 hours ending 07/29/20 1516 General: Nontoxic appearing, makes incomprehensible sounds, somnolent, lethargic, unresponsive today Cardiovascular: RRR Pulmonary: Accessory muscle use. GI:  Abdomen soft, nontender. Extremities: Warm, no edema or ischemia. Neuro: Somnolent, unresponsive today Labs:   
Recent Results (from the past 24 hour(s)) GLUCOSE, POC Collection Time: 07/28/20  4:09 PM  
Result Value Ref Range Glucose (POC) 208 (H) 70 - 110 mg/dL GLUCOSE, POC Collection Time: 07/28/20  9:12 PM  
Result Value Ref Range Glucose (POC) 153 (H) 70 - 110 mg/dL FERRITIN Collection Time: 07/29/20  5:49 AM  
Result Value Ref Range Ferritin 290 8 - 388 NG/ML  
C REACTIVE PROTEIN, QT Collection Time: 07/29/20  5:49 AM  
Result Value Ref Range C-Reactive protein 0.8 (H) 0 - 0.3 mg/dL PROCALCITONIN Collection Time: 07/29/20  5:49 AM  
Result Value Ref Range Procalcitonin <0.05 ng/mL D DIMER Collection Time: 07/29/20  5:49 AM  
Result Value Ref Range D DIMER 1.04 (H) <0.46 ug/ml(FEU) CBC W/O DIFF Collection Time: 07/29/20  5:49 AM  
Result Value Ref Range WBC 12.5 4.6 - 13.2 K/uL  
 RBC 3.83 (L) 4.20 - 5.30 M/uL  
 HGB 11.4 (L) 12.0 - 16.0 g/dL HCT 35.3 35.0 - 45.0 % MCV 92.2 74.0 - 97.0 FL  
 MCH 29.8 24.0 - 34.0 PG  
 MCHC 32.3 31.0 - 37.0 g/dL  
 RDW 14.9 (H) 11.6 - 14.5 % PLATELET 674 634 - 678 K/uL MPV 10.9 9.2 - 58.3 FL  
METABOLIC PANEL, BASIC Collection Time: 07/29/20  5:49 AM  
Result Value Ref Range Sodium 151 (H) 136 - 145 mmol/L Potassium 4.0 3.5 - 5.5 mmol/L Chloride 122 (H) 100 - 111 mmol/L  
 CO2 25 21 - 32 mmol/L Anion gap 4 3.0 - 18 mmol/L Glucose 145 (H) 74 - 99 mg/dL BUN 52 (H) 7.0 - 18 MG/DL Creatinine 1.88 (H) 0.6 - 1.3 MG/DL  
 BUN/Creatinine ratio 28 (H) 12 - 20 GFR est AA 30 (L) >60 ml/min/1.73m2 GFR est non-AA 25 (L) >60 ml/min/1.73m2 Calcium 9.4 8.5 - 10.1 MG/DL  
GLUCOSE, POC Collection Time: 07/29/20  9:42 AM  
Result Value Ref Range Glucose (POC) 196 (H) 70 - 110 mg/dL GLUCOSE, POC Collection Time: 07/29/20 11:50 AM  
Result Value Ref Range Glucose (POC) 175 (H) 70 - 110 mg/dL

## 2020-07-29 NOTE — PROGRESS NOTES
[facility] 
 
 
CARDIOLOGY PROGRESS NOTE 
RECS: 
 
 
 
1. NSTEMI- positive troponin. Now down trending. ekg with ST-T wave abnormality in inferior and lateral leads. Continue medical management. Asa.statin. Not a candidate for any invasive cardiac work-up 2. Intermittent bradycardia-discontinued BB 3. Elevated D-dimer- on Lovenox sq daily. 4. COV-19 - rapid test 7/21/20 detected- being managed by medical team  
5. Hypertension-improving this morning. May need to hold hydralazine as per parameters ordered. 6. Hx of nonsustained ventricular tachycardia- no recurrence on this admission will monitor on telemetry 7. Hyperlipidemia- continue statin 8. Type 2 diabetes mellitus without complication- medication per medical team                                   
9. 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 AMS- worsening. Poor prognosis Continue supportive care Discussed with nurse taking care. ASSESSMENT: 
Hospital Problems  Date Reviewed: 1/26/2017 Codes Class Noted POA * (Principal) Pneumonia due to 2019 novel coronavirus ICD-10-CM: U07.1, J12.89 ICD-9-CM: 480.8  7/26/2020 Unknown Goals of care, counseling/discussion ICD-10-CM: Z71.89 ICD-9-CM: V65.49  Unknown Unknown COVID-19 ICD-10-CM: U07.1 ICD-9-CM: 079.89  Unknown Unknown PNA (pneumonia) ICD-10-CM: J18.9 ICD-9-CM: 838  7/21/2020 Unknown NSTEMI (non-ST elevated myocardial infarction) ([facility] [address] ICD-10-CM: I21.4 ICD-9-CM: 410.70  7/20/2020 Unknown SUBJECTIVE: 
Confused per nursing staff No respiratory distress. No edema OBJECTIVE: 
 
VS:  
Visit Vitals /48 (BP 1 Location: Right arm, BP Patient Position: At rest) Pulse (!) 55 Temp 98.6 °F (37 °C) Resp 12 Ht 5' 6\" (1.676 m) Wt 79.9 kg (176 lb 3.2 oz) SpO2 97% BMI 28.44 kg/m² No intake or output data in the 24 hours ending 07/29/20 1031 TELE: NING 
 
 Limited physical exam to preserve PPE. Spoke with nursing staff patient is confused and unresponsive to verbal commands PULM:  not using accessory muscles on O2 by NC Heart  NSR sinus bradycardia HR 48's EXT: no edema visible SKIN: no acute rash on limited visible skin exam 
 
 
Labs: Results:  
   
Chemistry Recent Labs  
  07/29/20 
0549 07/28/20 0314 07/27/20 
0209 * 181* 193* * 149* 148* K 4.0 4.1 4.0  
* 118* 117* CO2 25 26 24 BUN 52* 54* 53* CREA 1.88* 1.95* 1.62* CA 9.4 9.4 9.3 AGAP 4 5 7 BUCR 28* 28* 33* CBC w/Diff Recent Labs  
  07/29/20 0549 07/27/20 
0209 WBC 12.5 11.9 RBC 3.83* 3.98* HGB 11.4* 11.7* HCT 35.3 35.7  337 GRANS  --  87* LYMPH  --  6*  
EOS  --  0 Cardiac Enzymes No results for input(s): CPK, CKND1, BETSEY in the last 72 hours. No lab exists for component: Crista Exon Coagulation Recent Labs  
  07/28/20 0314 07/27/20 0209 APTT 27.5 28.5 Lipid Panel Lab Results Component Value Date/Time Cholesterol, total 108 07/22/2020 11:41 AM  
 HDL Cholesterol 34 (L) 07/22/2020 11:41 AM  
 LDL, calculated 43.8 07/22/2020 11:41 AM  
 VLDL, calculated 30.2 07/22/2020 11:41 AM  
 Triglyceride 151 (H) 07/22/2020 11:41 AM  
 CHOL/HDL Ratio 3.2 07/22/2020 11:41 AM  
  
BNP No results for input(s): BNPP in the last 72 hours. Liver Enzymes No results for input(s): TP, ALB, TBIL, AP in the last 72 hours. No lab exists for component: SGOT, GPT, DBIL Thyroid Studies No results found for: T4, T3U, TSH, TSHEXT, TSHEXT Aramis Nazario MD

## 2020-07-30 NOTE — PROGRESS NOTES
Problem: Dysphagia (Adult) Goal: *Acute Goals and Plan of Care (Insert Text) Description: Patient will: 1. Tolerate PO trials with 0 s/s overt distress in 4/5 trials - met 2. Participate in training and education related to continued aspiration risk, diet recs and compensatory strategies  - n/a Recommend:  
Puree, thin liquids Feeding assistance/encouragement Meds in puree Aspiration precautions HOB >45 degrees during all intake and for at least 30 min after po Small bites/sips, Slow rate of intake, Alternating bites/sips Oral care post meals Outcome: Resolved/Met SPEECH LANGUAGE PATHOLOGY DYSPHAGIA TREATMENT & DISCHARGE Patient: Christopher Grant (41 y.o. female) Date: 7/30/2020 Diagnosis: NSTEMI (non-ST elevated [facility] infarction) [facility] [I21.4] Pneumonia due to 2019 novel coronavirus Precautions:  Fall, Aspiration, Skin, Contact PLOF: As per H&P  
 
ASSESSMENT: 
Pt was seen at bedside for follow up dysphagia management. Attempted to reposition pt x 3 throughout session; however, she continually slumped further down in the bed. She was moaning throughout treatment, but was accepting to PO via cup/sip, spoon, and syringe. She tolerated 1 tsp puree and ~1/4 of ensure with no overt s/sx of aspiration for which she required max encouragement/cues from SLP. Pt with very limited progress towards goals/recommendations during hospitalization. Suspect she is tolerating LRD without difficulty at this time. Continue to rec above stated diet with aspiration precautions. No further acute SLP services indicated at this time. Please re-consult if needed. Progression toward goals: 
[]         Goals met/approximated 
[x]         Not making progress/Not appropriate - will d/c POC PLAN: 
Recommendations and Planned Interventions: 
Maximum therapeutic gains met; safest, least restrictive diet achieved. D/C ST intervention at this time. Discharge Recommendations:  Luis Chavis and To Be Determined SUBJECTIVE:  
Patient only with moaning throughout session. OBJECTIVE:  
Cognitive and Communication Status: 
Neurologic State: Confused, Agitated Orientation Level: Disoriented X4 Cognition: Decreased attention/concentration, Decreased command following Safety/Judgement: Decreased insight into deficits, Decreased awareness of need for assistance Dysphagia Treatment: 
Oral Assessment: 
Oral Assessment Labial: No impairment Dentition: Intact Oral Hygiene: Good Lingual: No impairment Velum: No impairment Mandible: No impairment P.O. Trials: 
            Patient Position: 45 at [facility] Vocal quality prior to P.O.: Low volume Consistency Presented: Thin liquid, Puree, Mech soft How Presented: SLP-fed/presented, Straw, Successive swallows, Spoon Bolus Acceptance: No impairment Bolus Formation/Control: Impaired Type of Impairment: Mastication, Delayed Propulsion: Delayed (# of seconds) Oral Residue: ~20% of bolus with mech soft Initiation of Swallow: No impairment Laryngeal Elevation: Functional 
            Aspiration Signs/Symptoms: None Pharyngeal Phase Characteristics: Poor endurance Effective Modifications: Small sips and bites, Alternate liquids/solids Cues for Modifications: Maximal 
            Oral Phase Severity: Moderate Pharyngeal Phase Severity : Mild PAIN: 
Start of Tx: unable to report End of Tx: unable to report After treatment:  
[]              Patient left in no apparent distress sitting up in chair 
[x]              Patient left in no apparent distress in bed 
[x]              Call bell left within reach [x]              Nursing notified 
[]              Caregiver present 
[]              Bed alarm activated COMMUNICATION/EDUCATION:  
[x] Aspiration precautions; swallow safety; compensatory techniques [x]  Patient unable to participate in education; education ongoing with staff Thank you for this referral. 
 
NIHARIKA Pillai.S. CCC-SLP/L Speech-Language Pathologist

## 2020-07-30 NOTE — PROGRESS NOTES
Nutrition Assessment Type and Reason for Visit: Gera [place] Nutrition Recommendations/Plan: - Monitor ability to tolerate oral diet versus need for enteral nutrition support. Nutrition Assessment:  Requiring assistance eating, able to take po meds but refusing all meals and MD to address goals of care with the family. Receiving IVF NS with hypernatermia, BG elevated with steroid therapy. Malnutrition Assessment: 
Malnutrition Status: At risk for malnutrition (specify)(advanced age, 10 lb, 5.5% weight loss x month) Estimated Daily Nutrient Needs: 
Energy (kcal):  8395-8526 Protein (g):  62-77 Fluid (ml/day):  9629-0743 Nutrition Related Findings:  BM PTA veresus 7/21 per chart Current Nutrition Therapies: DIET NUTRITIONAL SUPPLEMENTS Breakfast, Lunch, Dinner; [employer] DIET CARDIAC Pureed; Consistent Carb 1800kcal 
 
Anthropometric Measures: 
· Height:  5' 6\" (167.6 cm) · Current Body Wt:  80 kg (176 lb 5.9 oz) · BMI: 28.5 Nutrition Diagnosis:  
· Inadequate oral intake related to cognitive or neurological impairment, swallowing difficulty as evidenced by intake 0-25% · Unintended weight loss related to cognitive or neurological impairment, inadequate protein-energy intake as evidenced by (10 lb, 5.5% weight loss x month) Nutrition Intervention: 
Food and/or Nutrient Delivery: (pending decision on goals of care) Coordination of Nutrition Care: Continued inpatient monitoring(pt discussed with MD and RN) Goals: 
Nutritional needs will be met through adequate oral intake or nutrition support within the next 7 days. Nutrition Monitoring and Evaluation:  
Food/Nutrient Intake Outcomes: Food and nutrient intake, Supplement intake, Diet advancement/tolerance Physical Signs/Symptoms Outcomes: Chewing or swallowing, Nutrition focused physical findings, Meal time behavior Discharge Planning: Too soon to determine Electronically signed by Turner Chang RD, [address]  on 7/30/2020 at 2:29 PM 
 
Contact Number: [phone]

## 2020-07-30 NOTE — PROGRESS NOTES
[facility] [phone] [facility]: [phone] 
  
Palliative Medicine follow up visit with Kala Juarez NP and this Claudia Donnelly RN.    Ms. Juan Jarvis is currently requiring high flow oxygen at 6 liters. Pt still with episodes of agitation. Attending has spoken with Pt's daughter Sera Pelaez. Per that conversation the family is not in a position to have pt return home due to [place] 19 +. Also placement at SNF with hospice care is a cost they can not afford. Per MD, Ms. Loy Angeles is in favor of avoiding any additional suffering. Palliative Medicine team members returned call  to pt's granddaughter, Katelyn Samaniego. Updated on clinical condition and comfort measures that can be offered. Ms Katelyn Samaniego is not sure her mother is totally understood what comfort measures included. At this time she would like to speak with her mother and uncle to clarify with them the next steps in Ms. Gutierrez's care. Attending updated. Plan to continue current care until all family members are in agreement to start comfort measures at the hospital.   
  
Goals of care DNR/DNI continue current medical treatment.  
  
  
Gaye IRWIN, RN, [facility] Palliative Medicine Inpatient [address]      
Palliative [facility] Line: [phone] ([phone])

## 2020-07-30 NOTE — PROGRESS NOTES
Problem: Mobility Impaired (Adult and Pediatric) Goal: *Acute Goals and Plan of Care (Insert Text) Description: Physical Therapy Goals Initiated 7/23/2020 reevaluated 7/30/2020 and to be accomplished within 7 day(s) 1. Patient will move from supine to sit and sit to supine  in bed with supervision/set-up. 2.  Patient will transfer from bed to chair and chair to bed with supervision/set-up using the least restrictive device. 3.  Patient will perform sit to stand with supervision/set-up. 4.  Patient will ambulate with supervision/set-up for 50 feet with the least restrictive device. PLOF: Unknown. Per care management note, patient lives with her daughter and was independent with ADL's. Outcome: Resolved/Not Met PHYSICAL THERAPY RE-EVALUATION AND DISCHARGE Patient: Lanette Dailey (56 y.o. female) Date: 7/30/2020 Primary Diagnosis: NSTEMI (non-ST elevated myocardial infarction) ([facility] [I21.4] Precautions:   Fall, Aspiration, Skin, Contact ASSESSMENT : 
Patient received semi reclined in bed with B UE restraints donned. She is sleeping, difficult to arouse. She awakens with sternal rub, but moans. Patient does not follow any commands. Patient is not able to meaningfully participate in PT session, therefore will discharge from PT caseload. PLAN : 
Recommendations and Planned Interventions:  
No formal PT needs identified at this time. Discharge Recommendations: LTC Further Equipment Recommendations for Discharge: hospital bed SUBJECTIVE:  
Patient moans. OBJECTIVE DATA SUMMARY:  
Hospital course since last seen and reason for re-evaluation: Patient is able to actively and meaningfully participate in PT session. Therefore, patient will be discharged from PT caseload. Past Medical History:  
Diagnosis Date Essential hypertension, benign Mitral valve disorders(424.0) Moderate MR Other and unspecified hyperlipidemia Other specified cardiac dysrhythmias(427.89) Non-sustained VT on Holter in past  
 Type II or unspecified type diabetes mellitus without mention of complication, not stated as uncontrolled History reviewed. No pertinent surgical history. Barriers to Learning/Limitations: yes;  cognitive and altered mental status (i.e.Sedation, Confusion) Compensate with: Visual Cues, Verbal Cues, Tactile Cues, and Kinesthetic Cues Home Situation:  
  
Critical Behavior: 
Neurologic State: Confused Orientation Level: Disoriented X4 Cognition: No command following Psychosocial 
Patient Behaviors: Confused Caritas Process: Nurture loving kindness Caring Interventions: Reassure; Therapeutic modalities Reassure: Therapeutic listening Therapeutic Modalities: Intentional therapeutic touch Range Of Motion: PROM: Within functional limits Pain: 
Pain level pre-treatment: -/10 unable to verbalize Pain level post-treatment: -/10 Pain Intervention(s): Medication (see MAR); Rest, Ice, Repositioning Response to intervention: Nurse notified, See doc flow Activity Tolerance:  
limited Please refer to the flowsheet for vital signs taken during this treatment. After treatment:  
[]         Patient left in no apparent distress sitting up in chair 
[x]         Patient left in no apparent distress in bed 
[x]         Call bell left within reach [x]         Nursing notified 
[]         Caregiver present 
[]         Bed alarm activated 
[]         SCDs applied COMMUNICATION/EDUCATION:  
[x]         Role of Physical Therapy in the acute care setting. []         Fall prevention education was provided and the patient/caregiver indicated understanding. []         Patient/family have participated as able in goal setting and plan of care. []         Patient/family agree to work toward stated goals and plan of care.  
[]         Patient understands intent and goals of therapy, but is neutral about his/her participation. []         Patient is unable to participate in goal setting/plan of care: ongoing with therapy staff. 
[]         Other: Thank you for this referral. 
Gunner Valdez, PT Time Calculation: 8 mins Eval Complexity: History: MEDIUM  Complexity : 1-2 comorbidities / personal factors will impact the outcome/ POC Exam:MEDIUM Complexity : 3 Standardized tests and measures addressing body structure, function, activity limitation and / or participation in recreation  Presentation: MEDIUM Complexity : Evolving with changing characteristics  Clinical Decision Making:Medium Complexity    Overall Complexity:MEDIUM

## 2020-07-30 NOTE — PROGRESS NOTES
[facility] 
 
 
CARDIOLOGY PROGRESS NOTE 
RECS: 
 
 
 
1. NSTEMI- positive troponin-Continue medical management. Asa.statin. Not a candidate for any invasive cardiac work-up 2. Intermittent bradycardia-discontinued BB 3. Elevated D-dimer- on Lovenox sq daily. 4. COV-19 - rapid test 7/21/20 detected- being managed by medical team  
5. Hypertension-stable continue Norvasc and clonidine patch  May need to hold hydralazine as per parameters ordered. 6. Hx of nonsustained ventricular tachycardia- no recurrence on this admission will monitor on telemetry 7. Hyperlipidemia- continue statin 8. ZION- on ivf.       
9. Type 2 diabetes mellitus without complication- medication per medical team                                   
10. 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 Poor prognosis. Patient is very lethargic poor po intake. agree with  palliative [place] care ASSESSMENT: 
Hospital Problems  Date Reviewed: 1/26/2017 Codes Class Noted POA * (Principal) Pneumonia due to 2019 novel coronavirus ICD-10-CM: U07.1, J12.89 ICD-9-CM: 480.8  7/26/2020 Unknown Goals of care, counseling/discussion ICD-10-CM: Z71.89 ICD-9-CM: V65.49  Unknown Unknown COVID-19 ICD-10-CM: U07.1 ICD-9-CM: 079.89  Unknown Unknown PNA (pneumonia) ICD-10-CM: J18.9 ICD-9-CM: 158  7/21/2020 Unknown NSTEMI (non-ST elevated myocardial infarction) ([facility] [address] ICD-10-CM: I21.4 ICD-9-CM: 410.70  7/20/2020 Unknown SUBJECTIVE: 
Confused per nursing staff No respiratory distress. No edema OBJECTIVE: 
 
VS:  
Visit Vitals /69 (BP 1 Location: Left arm, BP Patient Position: At rest) Pulse (!) 59 Temp 97.6 °F (36.4 °C) Resp 18 Ht 5' 6\" (1.676 m) Wt 79.9 kg (176 lb 3.2 oz) SpO2 98% BMI 28.44 kg/m² No intake or output data in the 24 hours ending 07/30/20 1224 TELE: sinus bradycardia Limited physical exam to preserve PPE. Spoke with nursing staff and Dr. Severino Torres patient is confused and unresponsive to verbal commands PULM:  not using accessory muscles on O2 by NC Heart  NSR sinus bradycardia HR 48's EXT: no edema visible SKIN: no acute rash on limited visible skin exam 
 
 
Labs: Results:  
   
Chemistry Recent Labs  
  07/30/20 
0515 07/29/20 
0549 07/28/20 
0572 * 145* 181* * 151* 149*  
K 3.9 4.0 4.1 * 122* 118* CO2 23 25 26 BUN 49* 52* 54* CREA 1.87* 1.88* 1.95* CA 8.9 9.4 9.4 AGAP 6 4 5 BUCR 26* 28* 28* CBC w/Diff Recent Labs  
  07/29/20 
0549 WBC 12.5 RBC 3.83* HGB 11.4* HCT 35.3  Cardiac Enzymes No results for input(s): CPK, CKND1, BETSEY in the last 72 hours. No lab exists for component: Lyford Clement Coagulation Recent Labs  
  07/28/20 
0314 APTT 27.5 Lipid Panel Lab Results Component Value Date/Time Cholesterol, total 108 07/22/2020 11:41 AM  
 HDL Cholesterol 34 (L) 07/22/2020 11:41 AM  
 LDL, calculated 43.8 07/22/2020 11:41 AM  
 VLDL, calculated 30.2 07/22/2020 11:41 AM  
 Triglyceride 151 (H) 07/22/2020 11:41 AM  
 CHOL/HDL Ratio 3.2 07/22/2020 11:41 AM  
  
BNP No results for input(s): BNPP in the last 72 hours. Liver Enzymes No results for input(s): TP, ALB, TBIL, AP in the last 72 hours. No lab exists for component: SGOT, GPT, DBIL Thyroid Studies No results found for: T4, T3U, TSH, TSHEXT, TSHEXT MILLIE Estrada supervised I have independently evaluated the patient. All relevant labs and testing data's are reviewed. Care plan discussed and updated after review.  
 
Pedro Gonzalez MD

## 2020-07-30 NOTE — PROGRESS NOTES
Infectious Disease progress Note Reason: COVID-19 pneumonia Current abx Prior abx Solumedrol since 7/22 Pip/tazo since 7/27 Ceftriaxone 7/21-7/24 
azithromycin since 7/21/20-7/27/20 Lines:  
 
 
Assessment : 
 
89 with a hx of HTN, MV disorder, hyperlipidemia, and DM type 2 who presented to the ED on 7/21/20 by her daughter because she was sleeping too much. Labs on 7/23-ferritin 656, CRP 8.6, procalcitonin 0.17, d-dimer 3.99 Labs on 7/24-ferritin 603, CRP 5, procalcitonin 0.25, d-dimer 2.15 Labs on 7/27-ferritin 408, CRP 1.5, procalcitonin 0.2, d-dimer 0.90 
 labs on 7/28-ferritin 308, CRP 1.1, procalcitonin 0.1, d-dimer 1.22 Labs on 7/29-ferritin 290, CRP 0.8, procalcitonin less than 0.05, d-dimer 1.0 Labs on 7/30- ferritin 290, procalcitonin <0.05, d-dimer 1.06 Positive rapid COVID-19 test 7/21/2020 CT chest 7/19, cxr 7/19- Extensive patchy multifocal airspace disease Clinical presentation consistent with COVID-19 pneumonia (confirmed). Altered mental status on admission likely metabolic encephalopathy from COVID-19 infection Elevated D-dimer concerning for hypercoagulable state secondary to COVID-19 infection. Improved hypoxia. Improved mentation. CT head negative for acute stroke. Waxing and waning mental status may be secondary to delirium. Recommendations: 1.  cont piperacillin/tazobactam to cover for possible aspiration pneumonia 2. Continue Solumedrol-40 mg IV q 24 hour 3. Anticoagulation per primary team 
4. Monitor inflammatory markers daily-CRP, ferritin, procalcitonin, d-dimer 5. Titrate oxygen as tolerated 6. Aspiration precaution Above plan was discussed in details with RN and dr Jim Mendoza. Will continue to participate in the care of this patient. HPI: 
 
 
Patient was confused at the time of my evaluation. She did not answer any questions. Detailed review of system not feasible. 
 
 
home Medication List  
 Details doxazosin (CARDURA) 8 mg tablet Take 8 mg by mouth nightly. indapamide (LOZOL) 1.25 mg tablet Take  by mouth daily. escitalopram oxalate (LEXAPRO) 10 mg tablet Take 10 mg by mouth daily. losartan (COZAAR) 100 mg tablet Take 100 mg by mouth daily. hydrALAZINE (APRESOLINE) 50 mg tablet Take 50 mg by mouth three (3) times daily. atorvastatin (LIPITOR) 40 mg tablet Take 1 Tab by mouth daily. Qty: 90 Tab, Refills: 3  
  
ergocalciferol (ERGOCALCIFEROL) 50,000 unit capsule Take 50,000 Units by mouth every seven (7) days. atenolol (TENORMIN) 100 mg tablet Take 100 mg by mouth two (2) times a day. insulin glargine (LANTUS SOLOSTAR) 100 unit/mL (3 mL) pen 10 Units by SubCUTAneous route daily. insulin aspart (NOVOLOG) 100 unit/mL injection 6 Units by SubCUTAneous route Before breakfast, lunch, and dinner. aspirin 81 mg tablet Take 81 mg by mouth.  
  
sitaGLIPtin (JANUVIA) 100 mg tablet Take 100 mg by mouth daily. Current Facility-Administered Medications Medication Dose Route Frequency  hydrALAZINE (APRESOLINE) tablet 25 mg  25 mg Oral TID  methylPREDNISolone (PF) (SOLU-MEDROL) injection 40 mg  40 mg IntraVENous Q24H  
 doxazosin (CARDURA) tablet 8 mg  8 mg Oral BID  
 0.9% sodium chloride infusion  75 mL/hr IntraVENous CONTINUOUS  piperacillin-tazobactam (ZOSYN) 2.25 g in 0.9% sodium chloride (MBP/ADV) 50 mL MBP  2.25 g IntraVENous Q6H  
 enoxaparin (LOVENOX) injection 70 mg  70 mg SubCUTAneous Q24H  hydrALAZINE (APRESOLINE) 20 mg/mL injection 20 mg  20 mg IntraVENous Q6H PRN  
 cloNIDine (CATAPRES) 0.1 mg/24 hr patch 1 Patch  1 Patch TransDERmal every Monday  pantoprazole (PROTONIX) tablet 40 mg  40 mg Oral ACB  LORazepam (ATIVAN) injection 0.5 mg  0.5 mg IntraVENous Q6H PRN  
 acetaminophen (TYLENOL) tablet 650 mg  650 mg Oral Q6H PRN  
 amLODIPine (NORVASC) tablet 10 mg  10 mg Oral DAILY  aspirin chewable tablet 81 mg  81 mg Oral DAILY  atorvastatin (LIPITOR) tablet 40 mg  40 mg Oral DAILY  insulin glargine (LANTUS) injection 10 Units  10 Units SubCUTAneous QHS  insulin lispro (HUMALOG) injection   SubCUTAneous AC&HS  
 glucose chewable tablet 16 g  4 Tab Oral PRN  
 glucagon (GLUCAGEN) injection 1 mg  1 mg IntraMUSCular PRN  
 dextrose (D50W) injection syrg 12.5-25 g  25-50 mL IntraVENous PRN  zinc sulfate (ZINCATE) 220 (50) mg capsule 1 Cap  1 Cap Oral DAILY  cholecalciferol (VITAMIN D3) capsule 5,000 Units  5,000 Units Oral DAILY  ascorbic acid (vitamin C) (VITAMIN C) tablet 1,000 mg  1,000 mg Oral DAILY  melatonin tablet 3 mg  3 mg Oral QHS PRN  
 sodium chloride (NS) flush 5-40 mL  5-40 mL IntraVENous Q8H  
 sodium chloride (NS) flush 5-40 mL  5-40 mL IntraVENous PRN Allergies: Minoxidil Temp (24hrs), Av.9 °F (36.6 °C), Min:97 °F (36.1 °C), Max:98.6 °F (37 °C) Visit Vitals /64 (BP 1 Location: Left arm, BP Patient Position: At rest) Pulse 68 Temp 97.8 °F (36.6 °C) Resp 18 Ht 5' 6\" (1.676 m) Wt 79.9 kg (176 lb 3.2 oz) SpO2 96% BMI 28.44 kg/m² ROS: Unable to obtain Physical Exam: 
 
General:   laying on the bed,  Not communicative Skin:   no rashes or skin lesions noted on limited exam  
HEENT:  Normocephalic, atraumatic, PERRL, EOMI, no scleral icterus or pallor; no conjunctival hemmohage;     Neck supple, no bruits. Lungs:   non-labored, bilaterally clear to aspiration- no crackles wheezes rales or rhonchi Heart:  RRR, s1 and s2; no  rubs or gallops, no edema, + pedal pulses Abdomen:  soft, non-distended,  Non-tender Genitourinary:  deferred Extremities:   no clubbing, cyanosis; no joint effusions or swelling;  no erythema of LE Neurologic:  Moves all 4 extremities Psychiatric:   unable to assess Labs: Results:  
Chemistry Recent Labs  
  20 
0515 20 
1690 20 
6609 * 145* 181* * 151* 149*  
K 3.9 4.0 4.1 * 122* 118* CO2 23 25 26 BUN 49* 52* 54* CREA 1.87* 1.88* 1.95* CA 8.9 9.4 9.4 AGAP 6 4 5 BUCR 26* 28* 28* CBC w/Diff Recent Labs  
  07/29/20 
0549 WBC 12.5 RBC 3.83* HGB 11.4* HCT 35.3  Microbiology No results for input(s): CULT in the last 72 hours. RADIOLOGY: 
 
All available imaging studies/reports in [facility] for this admission were reviewed Total time spent >35 minutes. High complexity decision making was performed during the evaluation of this patient at high risk for decompensation with multiple organ involvement Above mentioned total time spent on reviewing the case/medical record/data/notes/EMR/patient examination/documentation/coordinating care with nurse/consultants, exclusive of procedures with complex decision making performed and > 50% time spent in face to face evaluation. Dr. Christy Colmenares, Infectious Disease Specialist 
[phone] July 30, 2020 
10:28 AM

## 2020-07-30 NOTE — DIABETES MGMT
GLYCEMIC CONTROL: 
 
Current diabetes medications: 
Lantus insulin 10 untis daily at bedtime Sliding scale lispro insulin ACHS. Very resistant dose. POC BG on 7/29/2020 : 196. POC BG on 7/29/2020 at time of review: 123, 139 Fasting BG 7/30/2020: 115 Recommendation: cont on lantus insulin dose 10 units daily and sliding scale lispro Patient is currently on IV Solumedrol 40 mg every 12 hours. 80 y.o. patient with history of type 2 diabetes mellitus. Diabetes medications: Unverified. Attempted to contact patient in room but no answer. Also attempted to contact her daughter, Shwetha Solis, ph: [phone], but no answer also SoloStar (lantus) pen insulin 10 units daily Novolog meal time insulin 6 units TID AC Januvia 100 mg daily 
  
Noted: 
NSTEMI 
COVID-19 
T2DM. A1c 6.9% (7/21/2020) Diet: cardiac pureed, consistent carb 1800kcal plus nutr suppl 
 
  
Jeny Goncalves RN [facility] Pager: [phone]

## 2020-07-30 NOTE — CONSULTS
Palliative Medicine Consult DR. CONTE'S [facility]: [phone] ([phone]) RUBEN SYED [facility]: [phone] [facility]: [phone] Patient Name: Lanette Dailey YOB: 1929 Date of Initial Consult: 2020, follow up 2020, 2020, 2020, 2020 Reason for Consult: Care decisions Requesting Provider: Ritesh Santana MD 
Primary Care Physician: Ana Maria Lance MD 
  
 SUMMARY:  
Lanette Dailey is a 80y.o. year old female with a past history of HTN, MV disorder, hyperlipidemia, and DM type 2 who was admitted on 2020 from home with a diagnosis of NSTEMI and COVID-19 Pneumonia. Current medical issues leading to Palliative Medicine involvement include: Care decisions. 2020 yelling out at times. Confused. Poor po intake 2020 agitated at times, calling for sister who is . Long conversation with daughter Rosa Maria Larose, granddaughter Luz Elena Aguiar. Discussed comfort option. They are discussing. 2020 seen this am around 1100. High flow donned. Alert eyes open. Spoke with daughter Rudi Grijalva and son Caitlyn Mcfadden via phone. DNR/DNI  
 
2020 comfortable appearing. Calm, in NAD  
 PALLIATIVE DIAGNOSES:  
1.Goals of care 2. Non-ST elevation MI 
3. Multifocal pneumonia 4. COVID-19 infection PLAN:  
1 2020 follow up this am. To preserve PPE viewed through monitor as she is COVID  19+ . Discussed with RN. Remains agitated at times. Yelling out. MAR reviewed Ativan 0.5 at 0100 this am, received x 2 yesterday. Despite best treatment she continues to be confused agitated not eating. Appreciate conversations by attending with daughter Rudi Grijalva. She indicated to him she would like comfort measures here in hospital, but would be unable to care for her mother at home. Spoke with grand daughter Luz Elena Aguiar ( at [facility] request I call her) Reviewed medical condition at some detail.  During conversation attending spoke with patient's daughter Saima Silver who indicated she does not wish for her mother to suffer and wishes to embark on a more  comfort directed approach. [facility] who asked we continue current level of care so she can speak with her mother to ensure she completley understood conversation. Message left with attending on above. Comfort measures discussed again in detail with [facility]. Goals of care DNR/DNI  
 
( Please see below for previous discussion ) .2020 follow up along with Ms Shaan Latham RN. Agitated, calling for her  sister Onelia Bhatia, \" take my hand [place]\" Family was able to participate in video ( daughter Rufino Carlisle, grand daughter [facility], and other family members) They were visibly upset and could be patient's distress. After video participated in family conference via phone with the above attendees. Updated on overall condition. Honest but compassionate discussion. Unfortunately despite best treatment and efforts she is declining, not eating, confused agitated. All likely due to the effects of COVD 19 and MI. All questions answered to the best of our ability. Discussed moving to comfort measures, symptom management, vs continuing current course. Hospice at home also discussed. Difficult discussions with family. We offered support in this difficult time. Shared with family unfortunately Ms Gutierrez's prognosis is poor and although difficult to predict her time maybe short. Family wishes to consider all options. Currently no change in care decisions, continue treatment. Goals of care DNR/DNI limited interventions. Continue current level of care. Attending updated on conversation. 2020 follow up on Ms Yamini Tellez. Noted on requiring high flow oxygen. Seen through window due to COVID 19 isolation. At time of our visit around 1100 she appeared calm, eyes open moving in bed. Discussed with BSRN no issues at that time. Poor po.  Spoke with daughter Saima Silver, and son Ilan Nix via phone. Updated on clinical condition as of 1100. Re addressed goals of care discussed with both Sara and Jimmy DNR/DNI. Ms Bergman Adria and Ms Bárbara Cornelius NP were witnesses to conversation. Plan on video conference with family at 9:30 tomorrow. We also shared with daughter, with advanced age and COVID 23 clinical condition can be very fluid, meaning her condition can change rapidly. Family is very hopeful and prayerful she will recover. Goals of care DNR/DNI continue current medical treatment. Please note at time of our call to the family not aware of code stroke being called. Attending and BSRN aware of code change. 7/24/2020 follow up along with Ms Sunil Mills RN. To preserve PPE patient evaluated via video monitor. She is calm. Spoke with RN calm for the most part. She remains confused. Spoke with another [place] daughter Tamala Cheadle. Ms Taz Cartwright also shared the call with her friend Hiro Poon who is palliative RN. We again addressed goals of care burdens of resuscitation, with advanced age  And the effect on her quality of life. All very understanding of information and they are continuing discussions which we highly encouraged. We have also shared with the family the possibility she may not return to her baseline level of functioning or cognition. Goals of care full code with full interventions. Goals of care: 
    Saw patient today, she is awake, alert, moving all extremities spontaneously and to purpose. She is trying to remove medical equipments and monitors which are attached to her. She appears to be confused. She is not in any distress. Called Ryann Narayan, son of patient when unable to contact daughter, Corinne Mcelroy. Ryann Narayan said to talk to his sister, Corinne Mcelroy about patient. We were able to talk to Corinne Mcelroy and patient's granddaughter today. They said that patient is very active and independent with her ADL prior to this admission.  They denied noticing confusion or any other mental issues with patient. They said that patient is mentally alert and very capable of deciding for herself prior to admission. They also said patient is able to walk and goes to the grocery store. Granddaughter said patient and her family has not had any discussion about AMD and goals of care. Discussed the benefits and burdens of goals of care with the granddaughter and with Ms. Tia Zambranodennischely. Social: has multiple recent deaths in the family ( son, nephew); one of her son is sick and in the hospital, per granddaughter. 2. Non-ST elevation MI: 
    Patient is on telemetry monitor and on IV Heparin 3. Multifocal pneumonia:  
    Patient is on O2 at 4 liters via NC with O2 saturation above 90%. She has no shortness of breath. 4. COVID-19 infection: 
    Rapid COVID-19 test on 7/21/2020 was detected. TREATMENT PREFERENCES:  
Code Status: DNR/DNI Advance Care Planning: 
[] The [facility] Interdisciplinary Team has updated the ACP Navigator with [address] and Patient Capacity Primary Decision Maker (Legal next of kin): Has no MPOA. Pt does not have an Advance Directive on file in EMR and is not able to complete one currently. Legal Next of Kin would remain as the medical decision maker at this time since Ms. Franki Enrique is confused. Pt is a  and has two living adult children. Health care decision making will fall to a consensus of Tia Chase (daughter) and Darleene Denver Holt(son) Call placed to Nichole Denver, he requested we contact his sister Burgess Montgomery she is the caregiver and makes all the decisions. Nava Mesa, daughter - home number: [phone], mobile number: [phone], e-mail: [email or web address] Medical Interventions: Limited additional interventions Other: As far as possible, the palliative care team has discussed with patient / health care proxy about goals of care / treatment preferences for patient.  
 
 HISTORY:  
 
 History obtained from: Chart, Daughter, Granddaughter CHIEF COMPLAINT: NSTEMI, COVID - 19 infection HPI/SUBJECTIVE: The patient is:  
[] Verbal and participatory [x] Non-participatory due to: confusion Clinical Pain Assessment (nonverbal scale for nonverbal patients):  
Patient doesn't appear to be having pain. She is focused on removing the medical equipments and monitors attached to her. Activity (Movement): Lying quietly, normal position Duration: for how long has pt been experiencing pain (e.g., 2 days, 1 month, years) Frequency: how often pain is an issue (e.g., several times per day, once every few days, constant) FUNCTIONAL ASSESSMENT:  
 
Palliative Performance Scale (PPS): PPS: 30 
 
ECOG 
ECOG Status : Completely disabled PSYCHOSOCIAL/SPIRITUAL SCREENING:  
  
Any spiritual / Congregation concerns: unable to assess 
[] Yes /  [] No 
 
Caregiver Burnout: 
[] Yes /  [] No /  [x] No Caregiver Present Anticipatory grief assessment: unable to assess 
[] Normal  / [] Maladaptive REVIEW OF SYSTEMS:  
 
Positive and pertinent negative findings in ROS are noted above in HPI. The following systems were [x] reviewed / [] unable to be reviewed as noted in HPI Other findings are noted below. Systems: constitutional, ears/nose/mouth/throat, respiratory, gastrointestinal, genitourinary, musculoskeletal, integumentary, neurologic, psychiatric, endocrine. Positive findings noted below. Modified ESAS Completed by: provider Fatigue: 0 Drowsiness: 0 Depression: 0 Pain: 0 Anxiety: 3 Dyspnea: 0 Stool Occurrence(s): 0 PHYSICAL EXAM:  
 
Wt Readings from Last 3 Encounters:  
07/29/20 79.9 kg (176 lb 3.2 oz) 01/26/17 82.6 kg (182 lb)  
08/25/16 83 kg (183 lb) Blood pressure 134/64, pulse (!) 56, temperature 97.9 °F (36.6 °C), resp. rate 18, height 5' 6\" (1.676 m), weight 79.9 kg (176 lb 3.2 oz), SpO2 96 %. Pain: Pain Scale 1: Adult Nonverbal Pain Scale Pain Intensity 1: 4 Pain Intervention(s) 1: Medication (see MAR) Last bowel movement: none recorded To preserve PPE as patient is COVID + PE is limited Constitutional: appears agitated yelling out Respiratory: breathing not labored, on nasal cannula at 6 liters HISTORY:  
 
Principal Problem: 
  Pneumonia due to 2019 novel coronavirus (7/26/2020) Active Problems: 
  NSTEMI (non-ST elevated myocardial infarction) ([address] (7/20/2020) PNA (pneumonia) (7/21/2020) Goals of care, counseling/discussion () COVID-19 () Past Medical History:  
Diagnosis Date  Essential hypertension, benign  Mitral valve disorders(424.0) Moderate MR  
 Other and unspecified hyperlipidemia  Other specified cardiac dysrhythmias(427.89) Non-sustained VT on Holter in past  
 Type II or unspecified type diabetes mellitus without mention of complication, not stated as uncontrolled History reviewed. No pertinent surgical history. Family History Problem Relation Age of Onset  Heart Disease Other Positive family history of ischemic heart disease. History reviewed, no pertinent family history. Social History Tobacco Use  Smoking status: Never Smoker  Smokeless tobacco: Never Used Substance Use Topics  Alcohol use: No  
 
Allergies Allergen Reactions  Minoxidil Other (comments) edema Current Facility-Administered Medications Medication Dose Route Frequency  hydrALAZINE (APRESOLINE) tablet 25 mg  25 mg Oral TID  methylPREDNISolone (PF) (SOLU-MEDROL) injection 40 mg  40 mg IntraVENous Q24H  
 doxazosin (CARDURA) tablet 8 mg  8 mg Oral BID  
 0.9% sodium chloride infusion  75 mL/hr IntraVENous CONTINUOUS  piperacillin-tazobactam (ZOSYN) 2.25 g in 0.9% sodium chloride (MBP/ADV) 50 mL MBP  2.25 g IntraVENous Q6H  
 enoxaparin (LOVENOX) injection 70 mg  70 mg SubCUTAneous Q24H  hydrALAZINE (APRESOLINE) 20 mg/mL injection 20 mg  20 mg IntraVENous Q6H PRN  
 cloNIDine (CATAPRES) 0.1 mg/24 hr patch 1 Patch  1 Patch TransDERmal every Monday  pantoprazole (PROTONIX) tablet 40 mg  40 mg Oral ACB  LORazepam (ATIVAN) injection 0.5 mg  0.5 mg IntraVENous Q6H PRN  
 acetaminophen (TYLENOL) tablet 650 mg  650 mg Oral Q6H PRN  
 amLODIPine (NORVASC) tablet 10 mg  10 mg Oral DAILY  aspirin chewable tablet 81 mg  81 mg Oral DAILY  atorvastatin (LIPITOR) tablet 40 mg  40 mg Oral DAILY  insulin glargine (LANTUS) injection 10 Units  10 Units SubCUTAneous QHS  insulin lispro (HUMALOG) injection   SubCUTAneous AC&HS  
 glucose chewable tablet 16 g  4 Tab Oral PRN  
 glucagon (GLUCAGEN) injection 1 mg  1 mg IntraMUSCular PRN  
 dextrose (D50W) injection syrg 12.5-25 g  25-50 mL IntraVENous PRN  zinc sulfate (ZINCATE) 220 (50) mg capsule 1 Cap  1 Cap Oral DAILY  cholecalciferol (VITAMIN D3) capsule 5,000 Units  5,000 Units Oral DAILY  ascorbic acid (vitamin C) (VITAMIN C) tablet 1,000 mg  1,000 mg Oral DAILY  melatonin tablet 3 mg  3 mg Oral QHS PRN  
 sodium chloride (NS) flush 5-40 mL  5-40 mL IntraVENous Q8H  
 sodium chloride (NS) flush 5-40 mL  5-40 mL IntraVENous PRN  
 
 
 LAB AND IMAGING FINDINGS:  
 
Lab Results Component Value Date/Time WBC 12.5 07/29/2020 05:49 AM  
 HGB 11.4 (L) 07/29/2020 05:49 AM  
 PLATELET 124 89/45/2266 05:49 AM  
 
Lab Results Component Value Date/Time Sodium 154 (H) 07/30/2020 05:15 AM  
 Potassium 3.9 07/30/2020 05:15 AM  
 Chloride 125 (H) 07/30/2020 05:15 AM  
 CO2 23 07/30/2020 05:15 AM  
 BUN 49 (H) 07/30/2020 05:15 AM  
 Creatinine 1.87 (H) 07/30/2020 05:15 AM  
 Calcium 8.9 07/30/2020 05:15 AM  
 Magnesium 2.1 07/23/2020 05:29 AM  
  
Lab Results Component Value Date/Time Alk.  phosphatase 64 07/26/2020 04:31 AM  
 Protein, total 6.7 07/26/2020 04:31 AM  
 Albumin 2.6 (L) 07/26/2020 04:31 AM  
 Globulin 4.1 (H) 07/26/2020 04:31 AM  
 
Lab Results Component Value Date/Time INR 1.4 (H) 07/23/2020 05:29 AM  
 Prothrombin time 16.8 (H) 07/23/2020 05:29 AM  
 aPTT 27.5 07/28/2020 03:14 AM  
  
Lab Results Component Value Date/Time Ferritin 290 07/30/2020 05:15 AM  
  
No results found for: PH, PCO2, PO2 No components found for: [place] Lab Results Component Value Date/Time  07/21/2020 02:03 AM  
 CK - MB 4.6 (H) 07/19/2020 08:10 PM  
  
 
   
 
Total time: 35 minutes Counseling / coordination time, spent as noted above: 30 minutes 
> 50% counseling / coordination: Yes with grand Kavya Malcolm  daughter Yumiko Wiggins Prolonged service was provided for  []30 min   []75 min in face to face time in the presence of the patient, spent as noted above. Time Start:  
Time End:  
Note: this can only be billed with 21281 (initial) or 78446 (follow up). If multiple start / stop times, list each separately.

## 2020-07-30 NOTE — PROGRESS NOTES
[facility]ist [facility] Progress Note Patient: Harmeet Freeman Age: 80 y.o. : 2/15/1929 MR#: [id number] SSN: [id number] Date: 2020 Subjective/24-hour events: No significant change in pt condition Unarousable to voice Nurse mentions that she is not eating Assessment:  
 
1. COVID-19 infection with COVID-19 pneumonia 2. Acute hypoxic respiratory failure 3. NSTEMI 4. ZION on CKD stage III- improved 5. Acute metabolic encephalopathy/delirium 6. Sinus bradycardia 7. Mild acute diastolic CHF 8. Type II DM 
9. Hypertension 10. Abnormal cognition, suspect underlying dementia 11. Advanced age Plan: 
 
Palliative care, cardiology and ID following CT head  negative for acute changes Cont IV abx per ID- zosyn Cont and wean solu-medrol, cont protonix Cont ASA, statin Cont clonidine patch, BB dc'ed Cont ativan prn for agitation Vitamin and mineral supplementation as ordered. Cont lantus and SSI w/accuchecks FU inflammatory markers PT, OT Aspiration precautions Palliative following with family and continuing discussions on [address] -continue current care - family unable to decide on comfort measures at this time Discussed pt care, A&P, and very poor prognosis with family who was understanding but at this point would like aggressive care and is hopeful she will return home in previous functional condition Case discussed with:  [x]Patient  [x]Family  [x]Nursing  []Case Management DVT Prophylaxis:  [x]Lovenox  []Hep SQ  []SCDs  []Coumadin   []On Heparin gtt Diet: Cardiac, modified Code Status: DNR Contact: Yee Barbour (daughter)   [phone] Disposition: Continue current care Lizbeth Rushing MD 
2020 Objective:  
VS:  
Visit Vitals /64 (BP 1 Location: Left arm, BP Patient Position: At rest) Pulse (!) 56 Temp 97.9 °F (36.6 °C) Resp 18 Ht 5' 6\" (1.676 m) Wt 79.9 kg (176 lb 3.2 oz) SpO2 96% BMI 28.44 kg/m² Tmax/24hrs: Temp (24hrs), Av.6 °F (36.4 °C), Min:97 °F (36.1 °C), Max:97.9 °F (36.6 °C) No intake or output data in the 24 hours ending 20 1635 General: Nontoxic appearing, makes incomprehensible sounds, somnolent, lethargic, unresponsive today Cardiovascular: RRR Pulmonary: Accessory muscle use. GI:  Abdomen soft, nontender. Extremities: Warm, no edema or ischemia. Neuro: Somnolent, unresponsive today Labs:   
Recent Results (from the past 24 hour(s)) GLUCOSE, POC Collection Time: 20  5:02 PM  
Result Value Ref Range Glucose (POC) 151 (H) 70 - 110 mg/dL GLUCOSE, POC Collection Time: 20  8:41 PM  
Result Value Ref Range Glucose (POC) 174 (H) 70 - 110 mg/dL FERRITIN Collection Time: 20  5:15 AM  
Result Value Ref Range Ferritin 290 8 - 388 NG/ML  
C REACTIVE PROTEIN, QT Collection Time: 20  5:15 AM  
Result Value Ref Range C-Reactive protein 0.6 (H) 0 - 0.3 mg/dL PROCALCITONIN Collection Time: 20  5:15 AM  
Result Value Ref Range Procalcitonin <0.05 ng/mL D DIMER Collection Time: 20  5:15 AM  
Result Value Ref Range D DIMER 1.06 (H) <0.46 ug/ml(FEU) METABOLIC PANEL, BASIC Collection Time: 20  5:15 AM  
Result Value Ref Range Sodium 154 (H) 136 - 145 mmol/L Potassium 3.9 3.5 - 5.5 mmol/L Chloride 125 (H) 100 - 111 mmol/L  
 CO2 23 21 - 32 mmol/L Anion gap 6 3.0 - 18 mmol/L Glucose 115 (H) 74 - 99 mg/dL BUN 49 (H) 7.0 - 18 MG/DL Creatinine 1.87 (H) 0.6 - 1.3 MG/DL  
 BUN/Creatinine ratio 26 (H) 12 - 20 GFR est AA 31 (L) >60 ml/min/1.73m2 GFR est non-AA 25 (L) >60 ml/min/1.73m2 Calcium 8.9 8.5 - 10.1 MG/DL  
GLUCOSE, POC Collection Time: 20  7:33 AM  
Result Value Ref Range Glucose (POC) 123 (H) 70 - 110 mg/dL GLUCOSE, POC Collection Time: 20 11:34 AM  
Result Value Ref Range Glucose (POC) 139 (H) 70 - 110 mg/dL GLUCOSE, POC Collection Time: 07/30/20  3:48 PM  
Result Value Ref Range Glucose (POC) 141 (H) 70 - 110 mg/dL

## 2020-07-31 NOTE — PROGRESS NOTES
[facility] [phone] ([phone]) [facility]: [phone] Palliative Medicine follow up visit with Juana Escoto NP and this Gracie Godoy RN. Ms. Dave Yi is currently requiring high flow oxygen at 6 liters. Per BSRN, Pt had episodes of agitation overnight requiring use of Ativan which has allowed her to be able to rest. Pt was able to drink some juice but is pocketing some of her food in the side of her month (family confirmed this had been happening at home prior to admission). Palliative Team members returned call to pt. s granddaughter, Aunhardeep Burciaga. Updated on clinical condition and comfort measures was further explained. Conference call with family was set up and placed at 6 am. Family was offered opportunity to ask questions related to comfort measures and progression of care. They were informed that once comfort measures are started at the hospital, the goal would be to start planning for discharge from the acute setting. They expressed gratitude for the open and candid conversation around feeding tubes and next steps. At this time family stated they have a clear picture of Ms. Ames condition. They stated, Ms. Severiano Deiters (79years old), pts daughter who Ms. Dave Yi lives with, is not able to care for patient at home by self. The patient does not have Medicaid that would cover LTC at [facility]. They have requested 24 hrs. to speak as a group to determine the direction of care. Plan to continue current level of care. Palliative Medicine will continue to provide support. Imani HECKN, RN, [facility] Palliative Medicine Inpatient RN  [facility] Line: [phone] ([phone])

## 2020-07-31 NOTE — PROGRESS NOTES
Discharge planning Chart reviewed. Palliative following. CM will continue to assist with transitional needs. ALIDA Sharpe, RN Pager # [phone] Care Manager

## 2020-07-31 NOTE — PROGRESS NOTES
[facility] 
 
 
CARDIOLOGY PROGRESS NOTE 
RECS: 
 
 
 
1. NSTEMI- positive troponin-Continue medical management. Asa.statin. Not a candidate for any invasive cardiac work-up 2. Intermittent bradycardia-discontinued BB 3. Elevated D-dimer- on Lovenox sq daily. 4. COV-19 - rapid test 7/21/20 detected- being managed by medical team  
5. Hypertension-stable continue Norvasc and clonidine patch  May need to hold hydralazine as per parameters ordered. 6. Hx of nonsustained ventricular tachycardia- no recurrence on this admission will monitor on telemetry 7. Hyperlipidemia- continue statin 8. Hypernatremia- NS changed to D5% 0.45 NS 9. Severe deconditioning- in the setting of above- poor po intake and AMS 10. 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 Poor prognosis. Waiting  for family make final decision in goals of care. Palliative /hospice care consulted ASSESSMENT: 
Hospital Problems  Date Reviewed: 1/26/2017 Codes Class Noted POA * (Principal) Pneumonia due to 2019 novel coronavirus ICD-10-CM: U07.1, J12.89 ICD-9-CM: 480.8  7/26/2020 Unknown Goals of care, counseling/discussion ICD-10-CM: Z71.89 ICD-9-CM: V65.49  Unknown Unknown COVID-19 ICD-10-CM: U07.1 ICD-9-CM: 079.89  Unknown Unknown PNA (pneumonia) ICD-10-CM: J18.9 ICD-9-CM: 794  7/21/2020 Unknown NSTEMI (non-ST elevated myocardial infarction) ([facility] ICD-10-CM: I21.4 ICD-9-CM: 410.70  7/20/2020 Unknown SUBJECTIVE: 
Confused per nursing staff No respiratory distress. No edema OBJECTIVE: 
 
VS:  
Visit Vitals /65 (BP 1 Location: Right arm, BP Patient Position: At rest) Pulse 60 Temp (!) 96.6 °F (35.9 °C) Resp 21 Ht 5' 6\" (1.676 m) Wt 80 kg (176 lb 5.9 oz) SpO2 99% BMI 28.47 kg/m² Intake/Output Summary (Last 24 hours) at 7/31/2020 1245 Last data filed at 7/31/2020 0588 Gross per 24 hour Intake 4651.25 ml Output  Net 4651.25 ml TELE: sinus bradycardia Limited physical exam to preserve PPE. Spoke with nursing staff and Dr. Corry Sapp patient is confused and unresponsive to verbal commands PULM:  not using accessory muscles on O2 by NC Heart  NSR sinus bradycardia HR 48's EXT: no edema visible SKIN: no acute rash on limited visible skin exam 
 
 
Labs: Results:  
   
Chemistry Recent Labs  
  07/31/20 
0337 07/30/20 
0515 07/29/20 
6082 * 115* 145* * 154* 151*  
K 3.9 3.9 4.0  
* 125* 122* CO2 22 23 25 BUN 45* 49* 52* CREA 1.74* 1.87* 1.88* CA 8.8 8.9 9.4 AGAP 7 6 4 BUCR 26* 26* 28* CBC w/Diff Recent Labs  
  07/29/20 
0549 WBC 12.5 RBC 3.83* HGB 11.4* HCT 35.3  Cardiac Enzymes No results for input(s): CPK, CKND1, BETSEY in the last 72 hours. No lab exists for component: Koreen Nail Coagulation No results for input(s): PTP, INR, APTT, INREXT, INREXT in the last 72 hours. Lipid Panel Lab Results Component Value Date/Time Cholesterol, total 108 07/22/2020 11:41 AM  
 HDL Cholesterol 34 (L) 07/22/2020 11:41 AM  
 LDL, calculated 43.8 07/22/2020 11:41 AM  
 VLDL, calculated 30.2 07/22/2020 11:41 AM  
 Triglyceride 151 (H) 07/22/2020 11:41 AM  
 CHOL/HDL Ratio 3.2 07/22/2020 11:41 AM  
  
BNP No results for input(s): BNPP in the last 72 hours. Liver Enzymes No results for input(s): TP, ALB, TBIL, AP in the last 72 hours. No lab exists for component: SGOT, GPT, DBIL Thyroid Studies No results found for: T4, T3U, TSH, TSHEXT, TSHEXT [address], NP-C supervised I have independently evaluated and examined the patient. All relevant labs and testing data's are reviewed. Care plan discussed and updated after review.  
 
Genet Jacob MD

## 2020-07-31 NOTE — PROGRESS NOTES
[facility] Hospitalist Group Progress Note Patient: Yahir Yost Age: 80 y.o. : 2/15/1929 MR#: [id number] SSN: [id number] Date: 2020 Subjective/24-hour events: No significant change in pt condition Unarousable to voice Nurse mentions that she is not eating No significant change since yesterday Assessment:  
 
1. COVID-19 infection with COVID-19 pneumonia 2. Acute hypoxic respiratory failure 3. NSTEMI 4. ZION on CKD stage III- improved 5. Acute metabolic encephalopathy/delirium 6. Sinus bradycardia 7. Mild acute diastolic CHF 8. Type II DM 
9. Hypertension 10. Abnormal cognition, suspect underlying dementia 11. Advanced age Plan: 
 
Palliative care, cardiology and ID following CT head  negative for acute changes Cont IV abx per ID- zosyn Cont and wean solu-medrol, cont protonix Cont ASA, statin Cont clonidine patch, BB dc'ed Cont ativan prn for agitation Vitamin and mineral supplementation as ordered. Cont lantus and SSI w/accuchecks FU inflammatory markers PT, OT Aspiration precautions Palliative following with family and continuing discussions on By[address] -continue current care - family unable to decide on comfort measures at this time Discussed pt care, A&P, and very poor prognosis with family who was understanding but at this point would like aggressive care and is hopeful she will return home in previous functional condition Case discussed with:  [x]Patient  [x]Family  [x]Nursing  []Case Management DVT Prophylaxis:  [x]Lovenox  []Hep SQ  []SCDs  []Coumadin   []On Heparin gtt Diet: Cardiac, modified Code Status: DNR Contact: Rubennaomy Martin (daughter)   [phone] Disposition: Continue current care Bishop Brooklynn BEEBE 
2020 Objective:  
VS:  
Visit Vitals /65 (BP 1 Location: Right arm, BP Patient Position: At rest) Pulse 71 Temp 97 °F (36.1 °C) Resp 20 Ht 5' 6\" (1.676 m) Wt 80 kg (176 lb 5.9 oz) SpO2 97% BMI 28.47 kg/m² Tmax/24hrs: Temp (24hrs), Av.2 °F (36.2 °C), Min:96.6 °F (35.9 °C), Max:98.1 °F (36.7 °C) Intake/Output Summary (Last 24 hours) at 2020 1644 Last data filed at 2020 6898 Gross per 24 hour Intake 4651.25 ml Output  Net 4651.25 ml General: Nontoxic appearing, makes incomprehensible sounds, somnolent, lethargic, unresponsive today Cardiovascular: RRR Pulmonary: Accessory muscle use. GI:  Abdomen soft, nontender. Extremities: Warm, no edema or ischemia. Neuro: Somnolent, unresponsive today Labs:   
Recent Results (from the past 24 hour(s)) GLUCOSE, POC Collection Time: 20  8:55 PM  
Result Value Ref Range Glucose (POC) 224 (H) 70 - 110 mg/dL GLUCOSE, POC Collection Time: 20  8:58 PM  
Result Value Ref Range Glucose (POC) 192 (H) 70 - 110 mg/dL FERRITIN Collection Time: 20  3:37 AM  
Result Value Ref Range Ferritin 257 8 - 388 NG/ML  
C REACTIVE PROTEIN, QT Collection Time: 20  3:37 AM  
Result Value Ref Range C-Reactive protein 0.6 (H) 0 - 0.3 mg/dL PROCALCITONIN Collection Time: 20  3:37 AM  
Result Value Ref Range Procalcitonin <0.05 ng/mL D DIMER Collection Time: 20  3:37 AM  
Result Value Ref Range D DIMER 0.90 (H) <0.46 ug/ml(FEU) METABOLIC PANEL, BASIC Collection Time: 20  3:37 AM  
Result Value Ref Range Sodium 158 (H) 136 - 145 mmol/L Potassium 3.9 3.5 - 5.5 mmol/L Chloride 129 (H) 100 - 111 mmol/L  
 CO2 22 21 - 32 mmol/L Anion gap 7 3.0 - 18 mmol/L Glucose 111 (H) 74 - 99 mg/dL BUN 45 (H) 7.0 - 18 MG/DL Creatinine 1.74 (H) 0.6 - 1.3 MG/DL  
 BUN/Creatinine ratio 26 (H) 12 - 20 GFR est AA 33 (L) >60 ml/min/1.73m2 GFR est non-AA 27 (L) >60 ml/min/1.73m2 Calcium 8.8 8.5 - 10.1 MG/DL  
GLUCOSE, POC Collection Time: 20  7:44 AM  
Result Value Ref Range Glucose (POC) 89 70 - 110 mg/dL GLUCOSE, POC Collection Time: 07/31/20 11:29 AM  
Result Value Ref Range Glucose (POC) 100 70 - 110 mg/dL GLUCOSE, POC Collection Time: 07/31/20  3:44 PM  
Result Value Ref Range Glucose (POC) 98 70 - 110 mg/dL

## 2020-07-31 NOTE — PROGRESS NOTES
visited with the family of Gene Jones, who is a 80 y.o.,female. The  provided the following Interventions: 
Family member Kerry Jimenez) contacted [facility] to organize a video conference call. Plan: 
Chaplains will continue to follow and will provide pastoral care on an as needed/requested basis.  recommends bedside caregivers page  on duty if patient shows signs of acute spiritual or emotional distress. [address] [facility]  
[phone]

## 2020-07-31 NOTE — PROGRESS NOTES
Verbal shift change report given to Christopher RN (oncoming nurse) by Antonio Banda RN (offgoing nurse). Report included the following information SBAR and MAR.

## 2020-07-31 NOTE — PROGRESS NOTES
Problem: Falls - Risk of 
Goal: *Absence of Falls Description: Document Tamanna Dear Fall Risk and appropriate interventions in the flowsheet. Outcome: Progressing Towards Goal 
Note: Fall Risk Interventions: 
Mobility Interventions: Bed/chair exit alarm Mentation Interventions: Adequate sleep, hydration, pain control Medication Interventions: Bed/chair exit alarm Elimination Interventions: Toilet paper/wipes in reach, Toileting schedule/hourly rounds Problem: Patient Education: Go to Patient Education Activity Goal: Patient/Family Education Outcome: Progressing Towards Goal 
  
Problem: Diabetes Self-Management Goal: *Disease process and treatment process Description: Define diabetes and identify own type of diabetes; list 3 options for treating diabetes. Outcome: Progressing Towards Goal 
Goal: *Incorporating nutritional management into lifestyle Description: Describe effect of type, amount and timing of food on blood glucose; list 3 methods for planning meals. Outcome: Progressing Towards Goal 
Goal: *Incorporating physical activity into lifestyle Description: State effect of exercise on blood glucose levels. Outcome: Progressing Towards Goal 
Goal: *Developing strategies to promote health/change behavior Description: Define the ABC's of diabetes; identify appropriate screenings, schedule and personal plan for screenings. Outcome: Progressing Towards Goal 
Goal: *Using medications safely Description: State effect of diabetes medications on diabetes; name diabetes medication taking, action and side effects. Outcome: Progressing Towards Goal 
Goal: *Monitoring blood glucose, interpreting and using results Description: Identify recommended blood glucose targets  and personal targets. Outcome: Progressing Towards Goal 
Goal: *Prevention, detection, treatment of acute complications Description: List symptoms of hyper- and hypoglycemia; describe how to treat low blood sugar and actions for lowering  high blood glucose level. Outcome: Progressing Towards Goal 
Goal: *Prevention, detection and treatment of chronic complications Description: Define the natural course of diabetes and describe the relationship of blood glucose levels to long term complications of diabetes. Outcome: Progressing Towards Goal 
Goal: *Developing strategies to address psychosocial issues Description: Describe feelings about living with diabetes; identify support needed and support network Outcome: Progressing Towards Goal 
Goal: *Insulin pump training Outcome: Progressing Towards Goal 
Goal: *Sick day guidelines Outcome: Progressing Towards Goal 
Goal: *Patient Specific Goal (EDIT GOAL, INSERT TEXT) Outcome: Progressing Towards Goal 
  
Problem: Patient Education: Go to Patient Education Activity Goal: Patient/Family Education Outcome: Progressing Towards Goal 
  
Problem: Pressure Injury - Risk of 
Goal: *Prevention of pressure injury Description: Document Anthony Scale and appropriate interventions in the flowsheet. Outcome: Progressing Towards Goal 
Note: Pressure Injury Interventions: 
Sensory Interventions: Check visual cues for pain Moisture Interventions: Absorbent underpads Activity Interventions: PT/OT evaluation Mobility Interventions: HOB 30 degrees or less Nutrition Interventions: Document food/fluid/supplement intake Friction and Shear Interventions: HOB 30 degrees or less Problem: Patient Education: Go to Patient Education Activity Goal: Patient/Family Education Outcome: Progressing Towards Goal 
  
Problem: Non-Violent Restraints Goal: *Removal from restraints as soon as assessed to be safe Outcome: Progressing Towards Goal 
Goal: *No harm/injury to patient while restraints in use Outcome: Progressing Towards Goal 
Goal: *Patient's dignity will be maintained Outcome: Progressing Towards Goal 
 Goal: *Patient Specific Goal (EDIT GOAL, INSERT TEXT) Outcome: Progressing Towards Goal 
Goal: Non-violent Restaints:Standard Interventions Outcome: Progressing Towards Goal 
Goal: Non-violent Restraints:Patient Interventions Outcome: Progressing Towards Goal 
Goal: Patient/Family Education Outcome: Progressing Towards Goal 
  
Problem: Patient Education: Go to Patient Education Activity Goal: Patient/Family Education Outcome: Progressing Towards Goal 
  
Problem: Patient Education: Go to Patient Education Activity Goal: Patient/Family Education Outcome: Progressing Towards Goal 
  
Problem: Patient Education: Go to Patient Education Activity Goal: Patient/Family Education Outcome: Progressing Towards Goal 
  
Problem: Nutrition Deficit Goal: *Optimize nutritional status Outcome: Progressing Towards Goal

## 2020-07-31 NOTE — DIABETES MGMT
GLYCEMIC CONTROL: 
 
80 y.o. patient with history of type 2 diabetes mellitus. Noted: 
NSTEMI 
COVID-19 
T2DM. A1c 6.9% (7/21/2020) POC BG readings on 7/30/2020 : 224, 192. POC BG on 7/2312020 at time of review: 80 Fasting BG 7/31/2020: 111 Recommendation: cont current insulin orders: lantus and sliding scale lispro. Patient is currently on IV Solumedrol 40 mg every 12 hours. Current diabetes medications: 
Lantus insulin 10 untis daily at bedtime Sliding scale lispro insulin ACHS. Very resistant dose. Diabetes medications: Unverified. Attempted to contact patient in room but no answer. Also attempted to contact her daughter, Sallie Alcantar, ph: [phone], but no answer also SoloStar (lantus) pen insulin 10 units daily Novolog meal time insulin 6 units TID AC Januvia 100 mg daily 
  
 
Diet: cardiac pureed, consistent carb 1800kcal plus nutr suppl 
 
  
Jacqueline Goff RN [facility] Pager: [phone]

## 2020-07-31 NOTE — PROGRESS NOTES
Nutrition Assessment Type and Reason for Visit: Fani Merida Nutrition Recommendations/Plan: - Monitor ability to tolerate oral diet versus need for enteral nutrition support. Nutrition Assessment:  Requiring assistance eating, able to take po meds but refusing all meals and MD to address goals of care with the family. Receiving IVF NS with worsening hypernatermia, changed to D5 1/2NS at 75 mL/hr. Malnutrition Assessment: 
Malnutrition Status: At risk for malnutrition (specify)(advanced age, 10 lb, 5.5% weight loss x month) Estimated Daily Nutrient Needs: 
Energy (kcal):  9668-9533 Protein (g):  62-77 Fluid (ml/day):  9198-8778 Nutrition Related Findings:  BM PTA versus 7/26 per chart Current Nutrition Therapies: DIET NUTRITIONAL SUPPLEMENTS Breakfast, Lunch, Dinner; [employer] DIET CARDIAC Pureed; Consistent Carb 1800kcal 
 
Anthropometric Measures: 
· Height:  5' 6\" (167.6 cm) · Current Body Wt:  80 kg (176 lb 5.9 oz) · BMI: 28.5 Nutrition Diagnosis:  
· Inadequate oral intake related to cognitive or neurological impairment, swallowing difficulty as evidenced by intake 0-25% · Unintended weight loss related to cognitive or neurological impairment, inadequate protein-energy intake as evidenced by (10 lb, 5.5% weight loss x month) Nutrition Intervention: 
Food and/or Nutrient Delivery: (pending decision on goals of care) Coordination of Nutrition Care: Continued inpatient monitoring Goals: 
Nutritional needs will be met through adequate oral intake or nutrition support within the next 7 days. Nutrition Monitoring and Evaluation:  
Food/Nutrient Intake Outcomes: Food and nutrient intake, Supplement intake, Diet advancement/tolerance Physical Signs/Symptoms Outcomes: Chewing or swallowing, Nutrition focused physical findings, Meal time behavior Discharge Planning: Too soon to determine Electronically signed by Racheal Santana RD, [address]  on 7/31/2020 at 1:54 PM 
 
Contact Number: [phone]

## 2020-07-31 NOTE — PROGRESS NOTES
Infectious Disease progress Note Reason: COVID-19 pneumonia Current abx Prior abx Solumedrol since 7/22 Pip/tazo since 7/27 Ceftriaxone 7/21-7/24 
azithromycin since 7/21/20-7/27/20 Lines:  
 
 
Assessment : 
 
89 with a hx of HTN, MV disorder, hyperlipidemia, and DM type 2 who presented to the ED on 7/21/20 by her daughter because she was sleeping too much. Labs on 7/23-ferritin 656, CRP 8.6, procalcitonin 0.17, d-dimer 3.99 Labs on 7/24-ferritin 603, CRP 5, procalcitonin 0.25, d-dimer 2.15 Labs on 7/27-ferritin 408, CRP 1.5, procalcitonin 0.2, d-dimer 0.90 
 labs on 7/28-ferritin 308, CRP 1.1, procalcitonin 0.1, d-dimer 1.22 Labs on 7/29-ferritin 290, CRP 0.8, procalcitonin less than 0.05, d-dimer 1.0 Labs on 7/30- ferritin 290, procalcitonin <0.05, d-dimer 1.06 Labs on 7/31-ferritin 257, procalcitonin less than 0.05, CRP 0.6, d-dimer 0.90 Positive rapid COVID-19 test 7/21/2020 CT chest 7/19, cxr 7/19- Extensive patchy multifocal airspace disease Clinical presentation consistent with COVID-19 pneumonia (confirmed). Altered mental status on admission likely metabolic encephalopathy from COVID-19 infection Elevated D-dimer concerning for hypercoagulable state secondary to COVID-19 infection. Improved hypoxia. Improved mentation. CT head negative for acute stroke. Waxing and waning mental status may be secondary to delirium. Remains confused. Recommendations: 1.  continue piperacillin/tazobactam (d5/7) to cover for possible aspiration pneumonia 2. Continue Solumedrol-40 mg IV q 24 hour 3. Anticoagulation per primary team 
4. Monitor inflammatory markers daily-CRP, ferritin, procalcitonin, d-dimer 5. Titrate oxygen as tolerated 6. Await family's decision about further goals of care. Prog: poor Above plan was discussed in details with RN and dr Carson Maxwell. Will continue to participate in the care of this patient.  
 
 
HPI: 
 
 
 Patient was confused at the time of my evaluation. She did not answer any questions. Detailed review of system not feasible. 
 
 
home Medication List  
 Details  
doxazosin (CARDURA) 8 mg tablet Take 8 mg by mouth nightly. indapamide (LOZOL) 1.25 mg tablet Take  by mouth daily. escitalopram oxalate (LEXAPRO) 10 mg tablet Take 10 mg by mouth daily. losartan (COZAAR) 100 mg tablet Take 100 mg by mouth daily. hydrALAZINE (APRESOLINE) 50 mg tablet Take 50 mg by mouth three (3) times daily. atorvastatin (LIPITOR) 40 mg tablet Take 1 Tab by mouth daily. Qty: 90 Tab, Refills: 3  
  
ergocalciferol (ERGOCALCIFEROL) 50,000 unit capsule Take 50,000 Units by mouth every seven (7) days. atenolol (TENORMIN) 100 mg tablet Take 100 mg by mouth two (2) times a day. insulin glargine (LANTUS SOLOSTAR) 100 unit/mL (3 mL) pen 10 Units by SubCUTAneous route daily. insulin aspart (NOVOLOG) 100 unit/mL injection 6 Units by SubCUTAneous route Before breakfast, lunch, and dinner. aspirin 81 mg tablet Take 81 mg by mouth.  
  
sitaGLIPtin (JANUVIA) 100 mg tablet Take 100 mg by mouth daily. Current Facility-Administered Medications Medication Dose Route Frequency  hydrALAZINE (APRESOLINE) tablet 25 mg  25 mg Oral TID  methylPREDNISolone (PF) (SOLU-MEDROL) injection 40 mg  40 mg IntraVENous Q24H  
 doxazosin (CARDURA) tablet 8 mg  8 mg Oral BID  
 0.9% sodium chloride infusion  75 mL/hr IntraVENous CONTINUOUS  piperacillin-tazobactam (ZOSYN) 2.25 g in 0.9% sodium chloride (MBP/ADV) 50 mL MBP  2.25 g IntraVENous Q6H  
 enoxaparin (LOVENOX) injection 70 mg  70 mg SubCUTAneous Q24H  hydrALAZINE (APRESOLINE) 20 mg/mL injection 20 mg  20 mg IntraVENous Q6H PRN  
 cloNIDine (CATAPRES) 0.1 mg/24 hr patch 1 Patch  1 Patch TransDERmal every Monday  pantoprazole (PROTONIX) tablet 40 mg  40 mg Oral ACB  LORazepam (ATIVAN) injection 0.5 mg  0.5 mg IntraVENous Q6H PRN  
  acetaminophen (TYLENOL) tablet 650 mg  650 mg Oral Q6H PRN  
 amLODIPine (NORVASC) tablet 10 mg  10 mg Oral DAILY  aspirin chewable tablet 81 mg  81 mg Oral DAILY  atorvastatin (LIPITOR) tablet 40 mg  40 mg Oral DAILY  insulin glargine (LANTUS) injection 10 Units  10 Units SubCUTAneous QHS  insulin lispro (HUMALOG) injection   SubCUTAneous AC&HS  
 glucose chewable tablet 16 g  4 Tab Oral PRN  
 glucagon (GLUCAGEN) injection 1 mg  1 mg IntraMUSCular PRN  
 dextrose (D50W) injection syrg 12.5-25 g  25-50 mL IntraVENous PRN  zinc sulfate (ZINCATE) 220 (50) mg capsule 1 Cap  1 Cap Oral DAILY  cholecalciferol (VITAMIN D3) capsule 5,000 Units  5,000 Units Oral DAILY  ascorbic acid (vitamin C) (VITAMIN C) tablet 1,000 mg  1,000 mg Oral DAILY  melatonin tablet 3 mg  3 mg Oral QHS PRN  
 sodium chloride (NS) flush 5-40 mL  5-40 mL IntraVENous Q8H  
 sodium chloride (NS) flush 5-40 mL  5-40 mL IntraVENous PRN Allergies: Minoxidil Temp (24hrs), Av.5 °F (36.4 °C), Min:96.6 °F (35.9 °C), Max:98.1 °F (36.7 °C) Visit Vitals /60 (BP 1 Location: Right arm, BP Patient Position: At rest) Pulse 80 Temp (!) 96.6 °F (35.9 °C) Resp 20 Ht 5' 6\" (1.676 m) Wt 80 kg (176 lb 5.9 oz) SpO2 100% BMI 28.47 kg/m² ROS: Unable to obtain Physical Exam: 
 
General:   laying on the bed,  Not communicative Skin:   no rashes or skin lesions noted on limited exam  
HEENT:  Normocephalic, atraumatic, PERRL, EOMI, no scleral icterus or pallor; no conjunctival hemmohage;     Neck supple, no bruits. Lungs:   non-labored, bilaterally clear to aspiration- no crackles wheezes rales or rhonchi Heart:  RRR, s1 and s2; no  rubs or gallops, no edema, + pedal pulses Abdomen:  soft, non-distended,  Non-tender Genitourinary:  deferred Extremities:   no clubbing, cyanosis; no joint effusions or swelling;  no erythema of LE Neurologic:  Moves all 4 extremities Psychiatric:   unable to assess Labs: Results:  
Chemistry Recent Labs  
  07/31/20 
8481 07/30/20 
0515 07/29/20 
2356 * 115* 145* * 154* 151*  
K 3.9 3.9 4.0  
* 125* 122* CO2 22 23 25 BUN 45* 49* 52* CREA 1.74* 1.87* 1.88* CA 8.8 8.9 9.4 AGAP 7 6 4 BUCR 26* 26* 28* CBC w/Diff Recent Labs  
  07/29/20 
0549 WBC 12.5 RBC 3.83* HGB 11.4* HCT 35.3  Microbiology No results for input(s): CULT in the last 72 hours. RADIOLOGY: 
 
All available imaging studies/reports in [facility] for this admission were reviewed Total time spent >35 minutes. High complexity decision making was performed during the evaluation of this patient at high risk for decompensation with multiple organ involvement Above mentioned total time spent on reviewing the case/medical record/data/notes/EMR/patient examination/documentation/coordinating care with nurse/consultants, exclusive of procedures with complex decision making performed and > 50% time spent in face to face evaluation. Dr. Nadir España, Infectious Disease Specialist 
[phone] July 31, 2020 
10:28 AM

## 2020-07-31 NOTE — PROGRESS NOTES
Marian Cameron, who is a 80 y.o.,female. Patient's Primary Language is: [place]. According to the patient's EMR Episcopalian Affiliation is: Birdie Angulo provided staff with a [facility] for the patient. Provided leaflet about virtual visits. Chaplains can provide Spiritual Care to [facility] patients via phone conference. Patients can call Spiritual Care at [phone], or dial; or  dial 0  on their hospital phones and ask the  to page a . No physical visits will be initiated by Chaplains while patients are in isolation for [facility]. Connect care will be noted with DNV (do not visit) until patients are no longer in [address] isolation. If a COVID19 patient has a phone conference with a ; connect care will be noted IV-SA-DNV-(followed by the Chaplains initials). Plan: 
Chaplains will continue to follow and will provide pastoral care on an as needed/requested basis.  recommends bedside caregivers page  on duty if patient shows signs of acute spiritual or emotional distress. [address] [facility] Spiritual Care 
[phone]

## 2020-07-31 NOTE — ROUTINE PROCESS
Bedside shift change report given to Leonid (oncoming nurse) by Dagoberto Franklin  (offgoing nurse). Report included the following information SBAR and Kardex.

## 2020-07-31 NOTE — CONSULTS
Palliative Medicine Consult DR. CONTE[facility]: [phone] ([phone]) [facility]: [phone] [facility]: [phone] Patient Name: An Lucas YOB: 1929 Date of Initial Consult: 2020, follow up 2020, 2020, 2020, 2020, 2020 Reason for Consult: Care decisions Requesting Provider: Nolan Waters MD 
Primary Care Physician: Mic Ya MD 
  
 SUMMARY:  
An Lucas is a 80y.o. year old female with a past history of HTN, MV disorder, hyperlipidemia, and DM type 2 who was admitted on 2020 from home with a diagnosis of NSTEMI and COVID-19 Pneumonia. Current medical issues leading to Palliative Medicine involvement include: Care decisions. 2020 calm at time we saw. Spoke with nurse agitated last night with yelling out. Spoke with family including daughter Charli Hernandez and grand daughters. No decision on comfort measures. DNR/DNI  
 
2020 yelling out at times. Confused. Poor po intake 2020 agitated at times, calling for sister who is . Long conversation with daughter Charli Hernandez, granddaughter Yee Dodson. Discussed comfort option. They are discussing. 2020 seen this am around 1100. High flow donned. Alert eyes open. Spoke with daughter Bobbi Trejo and son Mau Thomas via phone. DNR/DNI  
 
2020 comfortable appearing. Calm, in NAD  
 PALLIATIVE DIAGNOSES:  
1.Goals of care 2. Non-ST elevation MI 
3. Multifocal pneumonia 4. COVID-19 infection PLAN:  
1 2020  Follow up on Ms Yong Huffman. She is lying in bed quiet this AM. Discussed with nurse darwin last night received ativan. Long conversation with daughter Bobbi Trejo grand daughters Brandyn Saldana and Ric Bustos. Clinical update given, including unfortunately despite best treatment her mentation is not improving nor is her appetite.  Discussed aggressive care moving forward, nutrition will need to be addressed including feeding tube placement. We shared the burdens of these efforts at some length with family. Comfort measures discussed at some length with family. We asked family to consider what Lacie Boogie would want and tell them to do if she were able and understanding her illness and how this affects her quality of life. Family did share they would not be able to care for patient at home as there is limited family available. No care decisions were made today. Goals of care DNR/DNI limited inventions, feeding tube to be decided. Further care decisions to be decided on by family. Family asked for time to re discuss with all family members. ( please see below for previous conversations)  
 
 2020 follow up this am. To preserve PPE viewed through monitor as she is COVID  19+ . Discussed with RN. Remains agitated at times. Yelling out. MAR reviewed Ativan 0.5 at 0100 this am, received x 2 yesterday. Despite best treatment she continues to be confused agitated not eating. Appreciate conversations by attending with daughter Shwetha Solis. She indicated to him she would like comfort measures here in hospital, but would be unable to care for her mother at home. Spoke with grand daughter Tod Perez ( at [facility] request I call her) Reviewed medical condition at some detail. During conversation attending spoke with patient's daughter Shwetha Solis who indicated she does not wish for her mother to suffer and wishes to embark on a more  comfort directed approach. Discussed with Tod Perez who asked we continue current level of care so she can speak with her mother to ensure she completley understood conversation. Message left with attending on above. Comfort measures discussed again in detail with Tod Perez. Goals of care DNR/DNI  
 
 
 
.2020 follow up along with Ms Dennis Hawkins RN.  Agitated, calling for her  sister Norma Sue, \" take my hand [place]\" Family was able to participate in video ( daughter Zoie Christensen, grand daughter Neris Sargent, and other family members) They were visibly upset and could be patient's distress. After video participated in family conference via phone with the above attendees. Updated on overall condition. Honest but compassionate discussion. Unfortunately despite best treatment and efforts she is declining, not eating, confused agitated. All likely due to the effects of COVD 19 and MI. All questions answered to the best of our ability. Discussed moving to comfort measures, symptom management, vs continuing current course. Hospice at home also discussed. Difficult discussions with family. We offered support in this difficult time. Shared with family unfortunately Ms Gutierrez's prognosis is poor and although difficult to predict her time maybe short. Family wishes to consider all options. Currently no change in care decisions, continue treatment. Goals of care DNR/DNI limited interventions. Continue current level of care. Attending updated on conversation. 7/27/2020 follow up on Ms Natacha Tobias. Noted on requiring high flow oxygen. Seen through window due to COVID 19 isolation. At time of our visit around 1100 she appeared calm, eyes open moving in bed. Discussed with BSRN no issues at that time. Poor po. Spoke with daughter Kenan Verma, and son Beata Sal via phone. Updated on clinical condition as of 1100. Re addressed goals of care discussed with both Sara and Jimmy DNR/DNI. Ms Leninbre Alexa and Ms Saul Barrera, ANIBAL were witnesses to conversation. Plan on video conference with family at 9:30 tomorrow. We also shared with daughter, with advanced age and COVID 23 clinical condition can be very fluid, meaning her condition can change rapidly. Family is very hopeful and prayerful she will recover. Goals of care DNR/DNI continue current medical treatment. Please note at time of our call to the family not aware of code stroke being called. Attending and BSRN aware of code change. 7/24/2020 follow up along with Ms Gunjan Hernandez RN. To preserve PPE patient evaluated via video monitor. She is calm. Spoke with RN calm for the most part. She remains confused. Spoke with another Ivan daughter Sisi Beaver. Ms Jacob Sanford also shared the call with her friend Adan Kristian who is palliative RN. We again addressed goals of care burdens of resuscitation, with advanced age  And the effect on her quality of life. All very understanding of information and they are continuing discussions which we highly encouraged. We have also shared with the family the possibility she may not return to her baseline level of functioning or cognition. Goals of care full code with full interventions. Goals of care: 
    Saw patient today, she is awake, alert, moving all extremities spontaneously and to purpose. She is trying to remove medical equipments and monitors which are attached to her. She appears to be confused. She is not in any distress. Called Francisco Germain, son of patient when unable to contact daughter, Tia Zambranodennischely. Francisco Germain said to talk to his sister, Tia Zambranodennischely about patient. We were able to talk to Catyyovani Brochchely and patient's granddaughter today. They said that patient is very active and independent with her ADL prior to this admission. They denied noticing confusion or any other mental issues with patient. They said that patient is mentally alert and very capable of deciding for herself prior to admission. They also said patient is able to walk and goes to the grocery store. Granddaughter said patient and her family has not had any discussion about AMD and goals of care. Discussed the benefits and burdens of goals of care with the granddaughter and with Ms. Weber Salvatore. Social: has multiple recent deaths in the family ( son, nephew); one of her son is sick and in the hospital, per granddaughter. 2. Non-ST elevation MI: 
    Patient is on telemetry monitor and on IV Heparin 3.  Multifocal pneumonia:  
 Patient is on O2 at 4 liters via NC with O2 saturation above 90%. She has no shortness of breath. 4. COVID-19 infection: 
    Rapid COVID-19 test on 7/21/2020 was detected. TREATMENT PREFERENCES:  
Code Status: DNR/DNI Advance Care Planning: 
[] The [facility] Interdisciplinary Team has updated the ACP Navigator with [address] and Patient Capacity Primary Decision Maker (Legal next of kin): Has no MPOA. Pt does not have an Advance Directive on file in EMR and is not able to complete one currently. Legal Next of Kin would remain as the medical decision maker at this time since Ms. Katelynn Campos is confused. Pt is a  and has two living adult children. Health care decision making will fall to a consensus of Maribel Antunez (daughter) and Ludwig Gutierrez(son) Call placed to Ludwig Myrick, he requested we contact his sister Sherry Pacheco she is the caregiver and makes all the decisions. Levar Nolan, daughter - home number: [phone], mobile number: [phone], e-mail: [email or web address] Medical Interventions: Limited additional interventions Other: As far as possible, the palliative care team has discussed with patient / health care proxy about goals of care / treatment preferences for patient. HISTORY:  
 
History obtained from: Chart, Daughter, Granddaughter CHIEF COMPLAINT: NSTEMI, COVID - 19 infection HPI/SUBJECTIVE: The patient is:  
[] Verbal and participatory [x] Non-participatory due to: confusion Clinical Pain Assessment (nonverbal scale for nonverbal patients):  
Patient doesn't appear to be having pain. She is focused on removing the medical equipments and monitors attached to her. Activity (Movement): Seeking attention through movement or slow, cautious movement Duration: for how long has pt been experiencing pain (e.g., 2 days, 1 month, years) Frequency: how often pain is an issue (e.g., several times per day, once every few days, constant) FUNCTIONAL ASSESSMENT:  
 
Palliative Performance Scale (PPS): PPS: 30 
 
ECOG 
ECOG Status : Completely disabled PSYCHOSOCIAL/SPIRITUAL SCREENING:  
  
Any spiritual / Catholic concerns: unable to assess 
[] Yes /  [] No 
 
Caregiver Burnout: 
[] Yes /  [] No /  [x] No Caregiver Present Anticipatory grief assessment: unable to assess 
[] Normal  / [] Maladaptive REVIEW OF SYSTEMS:  
 
Positive and pertinent negative findings in ROS are noted above in HPI. The following systems were [x] reviewed / [] unable to be reviewed as noted in HPI Other findings are noted below. Systems: constitutional, ears/nose/mouth/throat, respiratory, gastrointestinal, genitourinary, musculoskeletal, integumentary, neurologic, psychiatric, endocrine. Positive findings noted below. Modified ESAS Completed by: provider Fatigue: 0 Drowsiness: 0 Depression: 0 Pain: 0 Anxiety: 3 Dyspnea: 0 Stool Occurrence(s): 0 PHYSICAL EXAM:  
 
Wt Readings from Last 3 Encounters:  
07/31/20 80 kg (176 lb 5.9 oz) 01/26/17 82.6 kg (182 lb)  
08/25/16 83 kg (183 lb) Blood pressure 143/65, pulse 60, temperature (!) 96.6 °F (35.9 °C), resp. rate 21, height 5' 6\" (1.676 m), weight 80 kg (176 lb 5.9 oz), SpO2 99 %. Pain: 
Pain Scale 1: Visual 
Pain Intensity 1: 2 Pain Intervention(s) 1: Medication (see MAR) Last bowel movement: none recorded To preserve PPE as patient is COVID + PE is limited Constitutional: calm when we viewed face serene Respiratory: breathing not labored, on nasal cannula at 6 liters HISTORY:  
 
Principal Problem: 
  Pneumonia due to 2019 novel coronavirus (7/26/2020) Active Problems: 
  NSTEMI (non-ST elevated myocardial infarction) ([facility] [address] (7/20/2020) PNA (pneumonia) (7/21/2020) Goals of care, counseling/discussion () COVID-19 () Past Medical History:  
Diagnosis Date  Essential hypertension, benign  Mitral valve disorders(424.0) Moderate MR  
 Other and unspecified hyperlipidemia  Other specified cardiac dysrhythmias(427.89) Non-sustained VT on Holter in past  
 Type II or unspecified type diabetes mellitus without mention of complication, not stated as uncontrolled History reviewed. No pertinent surgical history. Family History Problem Relation Age of Onset  Heart Disease Other Positive family history of ischemic heart disease. History reviewed, no pertinent family history. Social History Tobacco Use  Smoking status: Never Smoker  Smokeless tobacco: Never Used Substance Use Topics  Alcohol use: No  
 
Allergies Allergen Reactions  Minoxidil Other (comments) edema Current Facility-Administered Medications Medication Dose Route Frequency  dextrose 5 % - 0.45% NaCl infusion  75 mL/hr IntraVENous CONTINUOUS  
 hydrALAZINE (APRESOLINE) tablet 25 mg  25 mg Oral TID  methylPREDNISolone (PF) (SOLU-MEDROL) injection 40 mg  40 mg IntraVENous Q24H  
 doxazosin (CARDURA) tablet 8 mg  8 mg Oral BID  piperacillin-tazobactam (ZOSYN) 2.25 g in 0.9% sodium chloride (MBP/ADV) 50 mL MBP  2.25 g IntraVENous Q6H  
 enoxaparin (LOVENOX) injection 70 mg  70 mg SubCUTAneous Q24H  hydrALAZINE (APRESOLINE) 20 mg/mL injection 20 mg  20 mg IntraVENous Q6H PRN  
 cloNIDine (CATAPRES) 0.1 mg/24 hr patch 1 Patch  1 Patch TransDERmal every Monday  pantoprazole (PROTONIX) tablet 40 mg  40 mg Oral ACB  LORazepam (ATIVAN) injection 0.5 mg  0.5 mg IntraVENous Q6H PRN  
 acetaminophen (TYLENOL) tablet 650 mg  650 mg Oral Q6H PRN  
 amLODIPine (NORVASC) tablet 10 mg  10 mg Oral DAILY  aspirin chewable tablet 81 mg  81 mg Oral DAILY  atorvastatin (LIPITOR) tablet 40 mg  40 mg Oral DAILY  insulin glargine (LANTUS) injection 10 Units  10 Units SubCUTAneous QHS  insulin lispro (HUMALOG) injection   SubCUTAneous AC&HS  
  glucose chewable tablet 16 g  4 Tab Oral PRN  
 glucagon (GLUCAGEN) injection 1 mg  1 mg IntraMUSCular PRN  
 dextrose (D50W) injection syrg 12.5-25 g  25-50 mL IntraVENous PRN  zinc sulfate (ZINCATE) 220 (50) mg capsule 1 Cap  1 Cap Oral DAILY  cholecalciferol (VITAMIN D3) capsule 5,000 Units  5,000 Units Oral DAILY  ascorbic acid (vitamin C) (VITAMIN C) tablet 1,000 mg  1,000 mg Oral DAILY  melatonin tablet 3 mg  3 mg Oral QHS PRN  
 sodium chloride (NS) flush 5-40 mL  5-40 mL IntraVENous Q8H  
 sodium chloride (NS) flush 5-40 mL  5-40 mL IntraVENous PRN  
 
 
 LAB AND IMAGING FINDINGS:  
 
Lab Results Component Value Date/Time WBC 12.5 07/29/2020 05:49 AM  
 HGB 11.4 (L) 07/29/2020 05:49 AM  
 PLATELET 786 68/94/4267 05:49 AM  
 
Lab Results Component Value Date/Time Sodium 158 (H) 07/31/2020 03:37 AM  
 Potassium 3.9 07/31/2020 03:37 AM  
 Chloride 129 (H) 07/31/2020 03:37 AM  
 CO2 22 07/31/2020 03:37 AM  
 BUN 45 (H) 07/31/2020 03:37 AM  
 Creatinine 1.74 (H) 07/31/2020 03:37 AM  
 Calcium 8.8 07/31/2020 03:37 AM  
 Magnesium 2.1 07/23/2020 05:29 AM  
  
Lab Results Component Value Date/Time Alk. phosphatase 64 07/26/2020 04:31 AM  
 Protein, total 6.7 07/26/2020 04:31 AM  
 Albumin 2.6 (L) 07/26/2020 04:31 AM  
 Globulin 4.1 (H) 07/26/2020 04:31 AM  
 
Lab Results Component Value Date/Time INR 1.4 (H) 07/23/2020 05:29 AM  
 Prothrombin time 16.8 (H) 07/23/2020 05:29 AM  
 aPTT 27.5 07/28/2020 03:14 AM  
  
Lab Results Component Value Date/Time Ferritin 257 07/31/2020 03:37 AM  
  
No results found for: PH, PCO2, PO2 No components found for: [place] Lab Results Component Value Date/Time  07/21/2020 02:03 AM  
 CK - MB 4.6 (H) 07/19/2020 08:10 PM  
  
 
   
 
Total time: 35 minutes Counseling / coordination time, spent as noted above: 30 minutes 
> 50% counseling / coordination: Yes with grand June Card  daughter Dulce Landeros Prolonged service was provided for  []30 min   []75 min in face to face time in the presence of the patient, spent as noted above. Time Start:  
Time End:  
Note: this can only be billed with 99784 (initial) or 52677 (follow up). If multiple start / stop times, list each separately.

## 2020-08-01 NOTE — PROGRESS NOTES
Problem: Falls - Risk of 
Goal: *Absence of Falls Description: Document Isaura Bridges Fall Risk and appropriate interventions in the flowsheet. Outcome: Progressing Towards Goal 
Note: Fall Risk Interventions: 
Mobility Interventions: Bed/chair exit alarm Mentation Interventions: Bed/chair exit alarm Medication Interventions: Bed/chair exit alarm Elimination Interventions: Toilet paper/wipes in reach Problem: Patient Education: Go to Patient Education Activity Goal: Patient/Family Education Outcome: Progressing Towards Goal 
  
Problem: Diabetes Self-Management Goal: *Disease process and treatment process Description: Define diabetes and identify own type of diabetes; list 3 options for treating diabetes. Outcome: Progressing Towards Goal 
Goal: *Incorporating nutritional management into lifestyle Description: Describe effect of type, amount and timing of food on blood glucose; list 3 methods for planning meals. Outcome: Progressing Towards Goal 
Goal: *Incorporating physical activity into lifestyle Description: State effect of exercise on blood glucose levels. Outcome: Progressing Towards Goal 
Goal: *Developing strategies to promote health/change behavior Description: Define the ABC's of diabetes; identify appropriate screenings, schedule and personal plan for screenings. Outcome: Progressing Towards Goal 
Goal: *Using medications safely Description: State effect of diabetes medications on diabetes; name diabetes medication taking, action and side effects. Outcome: Progressing Towards Goal 
Goal: *Monitoring blood glucose, interpreting and using results Description: Identify recommended blood glucose targets  and personal targets. Outcome: Progressing Towards Goal 
Goal: *Prevention, detection, treatment of acute complications Description: List symptoms of hyper- and hypoglycemia; describe how to treat low blood sugar and actions for lowering  high blood glucose level. Outcome: Progressing Towards Goal 
Goal: *Prevention, detection and treatment of chronic complications Description: Define the natural course of diabetes and describe the relationship of blood glucose levels to long term complications of diabetes. Outcome: Progressing Towards Goal 
Goal: *Developing strategies to address psychosocial issues Description: Describe feelings about living with diabetes; identify support needed and support network Outcome: Progressing Towards Goal 
Goal: *Insulin pump training Outcome: Progressing Towards Goal 
Goal: *Sick day guidelines Outcome: Progressing Towards Goal 
Goal: *Patient Specific Goal (EDIT GOAL, INSERT TEXT) Outcome: Progressing Towards Goal 
  
Problem: Patient Education: Go to Patient Education Activity Goal: Patient/Family Education Outcome: Progressing Towards Goal 
  
Problem: Pressure Injury - Risk of 
Goal: *Prevention of pressure injury Description: Document Anthony Scale and appropriate interventions in the flowsheet. Outcome: Progressing Towards Goal 
Note: Pressure Injury Interventions: 
Sensory Interventions: Assess changes in LOC Moisture Interventions: Absorbent underpads Activity Interventions: PT/OT evaluation Mobility Interventions: HOB 30 degrees or less Nutrition Interventions: Document food/fluid/supplement intake Friction and Shear Interventions: HOB 30 degrees or less Problem: Patient Education: Go to Patient Education Activity Goal: Patient/Family Education Outcome: Progressing Towards Goal 
  
Problem: Non-Violent Restraints Goal: *Removal from restraints as soon as assessed to be safe Outcome: Progressing Towards Goal 
Goal: *No harm/injury to patient while restraints in use Outcome: Progressing Towards Goal 
Goal: *Patient's dignity will be maintained Outcome: Progressing Towards Goal 
Goal: *Patient Specific Goal (EDIT GOAL, INSERT TEXT) Outcome: Progressing Towards Goal 
 Goal: Non-violent Restaints:Standard Interventions Outcome: Progressing Towards Goal 
Goal: Non-violent Restraints:Patient Interventions Outcome: Progressing Towards Goal 
Goal: Patient/Family Education Outcome: Progressing Towards Goal 
  
Problem: Patient Education: Go to Patient Education Activity Goal: Patient/Family Education Outcome: Progressing Towards Goal 
  
Problem: Patient Education: Go to Patient Education Activity Goal: Patient/Family Education Outcome: Progressing Towards Goal 
  
Problem: Patient Education: Go to Patient Education Activity Goal: Patient/Family Education Outcome: Progressing Towards Goal 
  
Problem: Nutrition Deficit Goal: *Optimize nutritional status Outcome: Progressing Towards Goal

## 2020-08-01 NOTE — ROUTINE PROCESS
Bedside shift change report given to Dk IZAGUIRRE (oncoming nurse) by Lakeshia Wallace RN (offgoing nurse). Report included the following information SBAR, Kardex, Procedure Summary, Intake/Output, MAR and Recent Results.

## 2020-08-01 NOTE — PROGRESS NOTES
[facility] Hospitalist Group Progress Note Patient: Luisa Rodriguez Age: 80 y.o. : 2/15/1929 MR#: [id number] SSN: [id number] Date: 2020 Subjective/24-hour events:  
  
Patient moaning at the time of my evaluation. Per nurse however patient moans intermittently and is comfortable at times. She seems to moan per nurse when she needs to be changed. She has refused both breakfast and lunch. Assessment:  
 
1. COVID-19 infection with COVID-19 pneumonia 2. Acute hypoxic respiratory failure requiring 02 supplementation 3. NSTEMI cardiology following 4. ZION on CKD stage III- improved 5. Acute metabolic encephalopathy/delirium 6. Sinus bradycardia 7. Mild acute diastolic CHF 8. Type II DM 
9. Hypertension 10. Abnormal cognition, suspect underlying dementia 11. Advanced age 12-Hypernatremia on D5 1/2 nl saline, sodium trending up, no labs today Plan: 
-Stat sodium and adjust fluids based on sodium result, last day of Zosyn tomorrow and this will decrease her sodium burden as well. 
-Palliative care, cardiology and ID following  
-Cont IV abx per ID- zosyn d6/7 
-Cont and wean solu-medrol, cont protonix 
-Cont ASA, statin  
-Cont clonidine patch, BB dc'ed     
-Cont ativan prn for agitation 
-Vitamin and mineral supplementation as ordered.   
-Cont lantus and SSI w/accuchecks  
-FU inflammatory markers  
-PT, OT 
-Aspiration precautions Palliative following with family and continuing discussions on [address] -continue current care - family unable to decide on comfort measures at this time 2020 discussed with family members over the phone including daughter, Ms. Rosalind Oglesby. Family members state that they are still in discussion about goals of care. They are to decide on Monday. Case discussed with:  [x]Patient  [x]Family  [x]Nursing  []Case Management DVT Prophylaxis:  [x]Lovenox  []Hep SQ  []SCDs  []Coumadin   []On Heparin gtt Diet: Cardiac, modified Code Status: DNR Contact: Rudi Grijalva (daughter)   [phone] Disposition: Continue current care Objective:  
VS:  
Visit Vitals /65 (BP 1 Location: Right arm, BP Patient Position: At rest) Pulse 61 Temp 97 °F (36.1 °C) Resp 19 Ht 5' 6\" (1.676 m) Wt 83 kg (182 lb 14.4 oz) SpO2 100% BMI 29.52 kg/m² Tmax/24hrs: Temp (24hrs), Av.2 °F (36.2 °C), Min:97 °F (36.1 °C), Max:97.8 °F (36.6 °C) Intake/Output Summary (Last 24 hours) at 2020 1758 Last data filed at 2020 0830 Gross per 24 hour Intake 1275 ml Output  Net 1275 ml General: Nontoxic appearing, makes incomprehensible sounds, somnolent, lethargic, unresponsive today Cardiovascular: RRR Pulmonary: Accessory muscle use. GI:  Abdomen soft, nontender. Extremities: Warm, no edema or ischemia. Neuro: Somnolent, unresponsive today Labs:   
Recent Results (from the past 24 hour(s)) GLUCOSE, POC Collection Time: 20  9:35 PM  
Result Value Ref Range Glucose (POC) 186 (H) 70 - 110 mg/dL FERRITIN Collection Time: 20  1:29 AM  
Result Value Ref Range Ferritin 243 8 - 388 NG/ML  
C REACTIVE PROTEIN, QT Collection Time: 20  1:29 AM  
Result Value Ref Range C-Reactive protein 0.5 (H) 0 - 0.3 mg/dL PROCALCITONIN Collection Time: 20  1:29 AM  
Result Value Ref Range Procalcitonin <0.05 ng/mL D DIMER Collection Time: 20  1:29 AM  
Result Value Ref Range D DIMER 1.04 (H) <0.46 ug/ml(FEU) GLUCOSE, POC Collection Time: 20  7:59 AM  
Result Value Ref Range Glucose (POC) 77 70 - 110 mg/dL GLUCOSE, POC Collection Time: 20 12:01 PM  
Result Value Ref Range Glucose (POC) 87 70 - 110 mg/dL GLUCOSE, POC Collection Time: 20  4:15 PM  
Result Value Ref Range Glucose (POC) 166 (H) 70 - 110 mg/dL

## 2020-08-01 NOTE — PROGRESS NOTES
attempted to help the family of the patient Mariam Escalante, who is a 80 y.o.,female, connect with the family with Zoom Video Connection. Not able to complete the request at this time. The  provided the following Interventions: The following outcomes were achieved: 
 
Assessment: 
There are no further spiritual or Worship issues which require Spiritual Care Services interventions at this time. Plan: 
Chaplains will continue to follow and will provide pastoral care on an as needed/requested basis.  recommends bedside caregivers page  on duty if patient shows signs of acute spiritual or emotional distress.

## 2020-08-01 NOTE — PROGRESS NOTES
Assumed care of female pt. Pt yells out. Sats 96% on RA. IVF infusing as ordered. Refuses telemetry. Bilateral wrist restraints on for safety.

## 2020-08-01 NOTE — PROGRESS NOTES
[facility] 
 
 
CARDIOLOGY PROGRESS NOTE 
RECS: 
 
 
 
1. NSTEMI- positive troponin-Continue medical management. Asa.statin. Not a candidate for any invasive cardiac work-up 2. Intermittent bradycardia-discontinued BB 3. Elevated D-dimer- on Lovenox sq daily. 4. COV-19 - rapid test 7/21/20 detected- being managed by medical team  
5. Hypertension-stable continue Norvasc and clonidine patch  May need to hold hydralazine as per parameters ordered. 6. Hx of nonsustained ventricular tachycardia- no recurrence on this admission will monitor on telemetry 7. Hyperlipidemia- continue statin 8. Hypernatremia- NS changed to D5% 0.45 NS 9. Severe deconditioning- in the setting of above- poor po intake and AMS 10. 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 Poor prognosis. Waiting  for family make final decision in goals of care. Palliative /hospice care consulted ASSESSMENT: 
Hospital Problems  Date Reviewed: 1/26/2017 Codes Class Noted POA * (Principal) Pneumonia due to 2019 novel coronavirus ICD-10-CM: U07.1, J12.89 ICD-9-CM: 480.8  7/26/2020 Unknown Goals of care, counseling/discussion ICD-10-CM: Z71.89 ICD-9-CM: V65.49  Unknown Unknown COVID-19 ICD-10-CM: U07.1 ICD-9-CM: 079.89  Unknown Unknown PNA (pneumonia) ICD-10-CM: J18.9 ICD-9-CM: 863  7/21/2020 Unknown NSTEMI (non-ST elevated myocardial infarction) ([facility] ICD-10-CM: I21.4 ICD-9-CM: 410.70  7/20/2020 Unknown SUBJECTIVE: 
Confused per nursing staff No respiratory distress. No edema. No eating OBJECTIVE: 
 
VS:  
Visit Vitals /67 (BP 1 Location: Right arm, BP Patient Position: At rest) Pulse 60 Temp 97 °F (36.1 °C) Resp 20 Ht 5' 6\" (1.676 m) Wt 83 kg (182 lb 14.4 oz) SpO2 99% BMI 29.52 kg/m² Intake/Output Summary (Last 24 hours) at 8/1/2020 1253 Last data filed at 8/1/2020 0830 Gross per 24 hour Intake 1275 ml Output  Net 1275 ml TELE: sinus bradycardia Limited physical exam to preserve PPE. Spoke with nursing staff and Dr. Patsy Ge patient is confused and unresponsive to verbal commands PULM:  not using accessory muscles on O2 by NC Heart  NSR sinus bradycardia HR 48's EXT: no edema visible SKIN: no acute rash on limited visible skin exam 
 
 
Labs: Results:  
   
Chemistry Recent Labs  
  07/31/20 
0337 07/30/20 
0515 * 115* * 154* K 3.9 3.9 * 125* CO2 22 23 BUN 45* 49* CREA 1.74* 1.87* CA 8.8 8.9 AGAP 7 6 BUCR 26* 26* CBC w/Diff No results for input(s): WBC, RBC, HGB, HCT, PLT, GRANS, LYMPH, EOS, HGBEXT, HCTEXT, PLTEXT, HGBEXT, HCTEXT, PLTEXT in the last 72 hours. Cardiac Enzymes No results for input(s): CPK, CKND1, BETSEY in the last 72 hours. No lab exists for component: Bettejane Heman Coagulation No results for input(s): PTP, INR, APTT, INREXT, INREXT in the last 72 hours. Lipid Panel Lab Results Component Value Date/Time Cholesterol, total 108 07/22/2020 11:41 AM  
 HDL Cholesterol 34 (L) 07/22/2020 11:41 AM  
 LDL, calculated 43.8 07/22/2020 11:41 AM  
 VLDL, calculated 30.2 07/22/2020 11:41 AM  
 Triglyceride 151 (H) 07/22/2020 11:41 AM  
 CHOL/HDL Ratio 3.2 07/22/2020 11:41 AM  
  
BNP No results for input(s): BNPP in the last 72 hours. Liver Enzymes No results for input(s): TP, ALB, TBIL, AP in the last 72 hours. No lab exists for component: SGOT, GPT, DBIL Thyroid Studies No results found for: T4, T3U, TSH, TSHEXT, TSHEXT MILLIE Lazar

## 2020-08-02 NOTE — PROGRESS NOTES
0725: Verbal and bedside shift change report given to Yumiko Weiss RN (oncoming nurse) by Jayde Taylor RN (offgoing nurse).  Report included the following information SBAR, Kardex, Intake/Output, MAR, Accordion, Med Rec Status and Cardiac Rhythm SR.

## 2020-08-02 NOTE — PROGRESS NOTES
Problem: Falls - Risk of 
Goal: *Absence of Falls Description: Document Carolina Borja Fall Risk and appropriate interventions in the flowsheet. Outcome: Progressing Towards Goal 
Note: Fall Risk Interventions: 
Mobility Interventions: Bed/chair exit alarm Mentation Interventions: Bed/chair exit alarm Medication Interventions: Bed/chair exit alarm Elimination Interventions: Bed/chair exit alarm Problem: Patient Education: Go to Patient Education Activity Goal: Patient/Family Education Outcome: Progressing Towards Goal 
  
Problem: Diabetes Self-Management Goal: *Disease process and treatment process Description: Define diabetes and identify own type of diabetes; list 3 options for treating diabetes. Outcome: Progressing Towards Goal 
Goal: *Incorporating nutritional management into lifestyle Description: Describe effect of type, amount and timing of food on blood glucose; list 3 methods for planning meals. Outcome: Progressing Towards Goal 
Goal: *Incorporating physical activity into lifestyle Description: State effect of exercise on blood glucose levels. Outcome: Progressing Towards Goal 
Goal: *Developing strategies to promote health/change behavior Description: Define the ABC's of diabetes; identify appropriate screenings, schedule and personal plan for screenings. Outcome: Progressing Towards Goal 
Goal: *Using medications safely Description: State effect of diabetes medications on diabetes; name diabetes medication taking, action and side effects. Outcome: Progressing Towards Goal 
Goal: *Monitoring blood glucose, interpreting and using results Description: Identify recommended blood glucose targets  and personal targets. Outcome: Progressing Towards Goal 
Goal: *Prevention, detection, treatment of acute complications Description: List symptoms of hyper- and hypoglycemia; describe how to treat low blood sugar and actions for lowering  high blood glucose level. Outcome: Progressing Towards Goal 
Goal: *Prevention, detection and treatment of chronic complications Description: Define the natural course of diabetes and describe the relationship of blood glucose levels to long term complications of diabetes. Outcome: Progressing Towards Goal 
Goal: *Developing strategies to address psychosocial issues Description: Describe feelings about living with diabetes; identify support needed and support network Outcome: Progressing Towards Goal 
Goal: *Sick day guidelines Outcome: Progressing Towards Goal 
  
Problem: Patient Education: Go to Patient Education Activity Goal: Patient/Family Education Outcome: Progressing Towards Goal 
  
Problem: Pressure Injury - Risk of 
Goal: *Prevention of pressure injury Description: Document Anthony Scale and appropriate interventions in the flowsheet. Outcome: Progressing Towards Goal 
Note: Pressure Injury Interventions: 
Sensory Interventions: Assess need for specialty bed Moisture Interventions: Check for incontinence Q2 hours and as needed Activity Interventions: Pressure redistribution bed/mattress(bed type) Mobility Interventions: HOB 30 degrees or less Nutrition Interventions: Document food/fluid/supplement intake Friction and Shear Interventions: HOB 30 degrees or less Problem: Patient Education: Go to Patient Education Activity Goal: Patient/Family Education Outcome: Progressing Towards Goal 
  
Problem: Non-Violent Restraints Goal: *Removal from restraints as soon as assessed to be safe Outcome: Progressing Towards Goal 
Goal: *No harm/injury to patient while restraints in use Outcome: Progressing Towards Goal 
Goal: *Patient's dignity will be maintained Outcome: Progressing Towards Goal 
Goal: *Patient Specific Goal (EDIT GOAL, INSERT TEXT) Outcome: Progressing Towards Goal 
Goal: Non-violent Restaints:Standard Interventions Outcome: Progressing Towards Goal 
 Goal: Non-violent Restraints:Patient Interventions Outcome: Progressing Towards Goal 
Goal: Patient/Family Education Outcome: Progressing Towards Goal 
  
Problem: Patient Education: Go to Patient Education Activity Goal: Patient/Family Education Outcome: Progressing Towards Goal 
  
Problem: Patient Education: Go to Patient Education Activity Goal: Patient/Family Education Outcome: Progressing Towards Goal 
  
Problem: Patient Education: Go to Patient Education Activity Goal: Patient/Family Education Outcome: Progressing Towards Goal 
  
Problem: Nutrition Deficit Goal: *Optimize nutritional status Outcome: Progressing Towards Goal 
  
Problem: Non-Violent Restraints Goal: *Removal from restraints as soon as assessed to be safe Outcome: Progressing Towards Goal 
Goal: *No harm/injury to patient while restraints in use Outcome: Progressing Towards Goal 
Goal: *Patient's dignity will be maintained Outcome: Progressing Towards Goal 
Goal: *Patient Specific Goal (EDIT GOAL, INSERT TEXT) Outcome: Progressing Towards Goal 
Goal: Non-violent Restaints:Standard Interventions Outcome: Progressing Towards Goal 
Goal: Non-violent Restraints:Patient Interventions Outcome: Progressing Towards Goal 
Goal: Patient/Family Education Outcome: Progressing Towards Goal

## 2020-08-02 NOTE — PROGRESS NOTES
conducted a Follow up consultation and Spiritual Assessment for Bertram Dunham, who is a 80 y.o.,female. The  provided the following Interventions: 
Continued the relationship of care and support with staff. Listened empathically. Chart reviewed. The following outcomes were achieved: Attempting to arrange a visoe conference. Assessment: 
There are no further spiritual or Jewish issues which require Spiritual Care Services interventions at this time. Plan: 
Chaplains will continue to follow and will provide pastoral care on an as needed/requested basis.  recommends bedside caregivers page  on duty if patient shows signs of acute spiritual or emotional distress. [address] [place] [address]  
[phone]

## 2020-08-02 NOTE — PROGRESS NOTES
[facility] Hospitalist Group Progress Note Patient: An Lucas Age: 80 y.o. : 2/15/1929 MR#: [id number] SSN: [id number] Date: 2020 Subjective/24-hour events:  
  
Patient moaning intermittently at the time of my evaluation but otherwise appears comfortable. She keeps her eyes closed and does not otherwise respond to verbal or tactile stimuli. Per nurse patient has been refusing to eat her meals. Assessment:  
 
1. COVID-19 infection with COVID-19 pneumonia 2. Acute hypoxic respiratory failure requiring 02 supplementation 3. NSTEMI cardiology following 4. ZION on CKD stage III- improved 5. Acute metabolic encephalopathy/delirium 6. Sinus bradycardia 7. Mild acute diastolic CHF 8. Type II DM 
9. Hypertension 10. Abnormal cognition, suspect underlying dementia 11. Advanced age 12-Hypernatremia on D5 1/2 nl saline, sodium trending up, no labs today Plan: 
-Continue to follow sodium and hydrate 
-Palliative care, cardiology and ID following  
-Status post Abx per ID- zosyn d7/7 
-Cont and wean solu-medrol, cont protonix 
-Cont ASA, statin  
-Cont clonidine patch, BB dc'ed     
-Cont ativan prn for agitation 
-Vitamin and mineral supplementation as ordered.   
-Cont lantus and SSI w/accuchecks  
-FU inflammatory markers  
-PT, OT 
-Aspiration precautions Palliative following with family and continuing discussions on [address] -continue current care - 
2020 discussed with family members over the phone including daughter, Ms. Lucía Salazar. Family members state that they are still in discussion about goals of care. They are to decide on Monday. Case discussed with:  [x]Patient  [x]Family  [x]Nursing  []Case Management DVT Prophylaxis:  [x]Lovenox  []Hep SQ  []SCDs  []Coumadin   []On Heparin gtt Diet: Cardiac, modified Code Status: DNR Contact: Bobbi Trejo (daughter)   [phone] Disposition: Continue current care Objective:  
VS:  
 Visit Vitals /62 (BP 1 Location: Right arm, BP Patient Position: At rest) Pulse (!) 56 Temp 98.1 °F (36.7 °C) Resp 20 Ht 5' 6\" (1.676 m) Wt 83 kg (182 lb 14.4 oz) SpO2 100% BMI 29.52 kg/m² Tmax/24hrs: Temp (24hrs), Av.8 °F (36.6 °C), Min:97.4 °F (36.3 °C), Max:98.1 °F (36.7 °C) Intake/Output Summary (Last 24 hours) at 2020 1739 Last data filed at 2020 1402 Gross per 24 hour Intake 1861.25 ml Output  Net 1861.25 ml General: Nontoxic appearing, makes incomprehensible sounds, somnolent, lethargic, unresponsive today Cardiovascular: RRR Pulmonary: wo increased wob. GI:  Abdomen soft, nontender. Extremities: Warm, no edema or ischemia. Neuro: Somnolent, unresponsive today Labs:   
Recent Results (from the past 24 hour(s)) GLUCOSE, POC Collection Time: 20  8:24 PM  
Result Value Ref Range Glucose (POC) 187 (H) 70 - 110 mg/dL FERRITIN Collection Time: 20  3:16 AM  
Result Value Ref Range Ferritin 205 8 - 388 NG/ML  
C REACTIVE PROTEIN, QT Collection Time: 20  3:16 AM  
Result Value Ref Range C-Reactive protein 0.4 (H) 0 - 0.3 mg/dL PROCALCITONIN Collection Time: 20  3:16 AM  
Result Value Ref Range Procalcitonin <0.05 ng/mL D DIMER Collection Time: 20  3:16 AM  
Result Value Ref Range D DIMER 0.79 (H) <0.46 ug/ml(FEU) METABOLIC PANEL, BASIC Collection Time: 20  3:16 AM  
Result Value Ref Range Sodium 151 (H) 136 - 145 mmol/L Potassium 3.4 (L) 3.5 - 5.5 mmol/L Chloride 124 (H) 100 - 111 mmol/L  
 CO2 23 21 - 32 mmol/L Anion gap 4 3.0 - 18 mmol/L Glucose 367 (H) 74 - 99 mg/dL BUN 28 (H) 7.0 - 18 MG/DL Creatinine 1.61 (H) 0.6 - 1.3 MG/DL  
 BUN/Creatinine ratio 17 12 - 20 GFR est AA 36 (L) >60 ml/min/1.73m2 GFR est non-AA 30 (L) >60 ml/min/1.73m2 Calcium 7.7 (L) 8.5 - 10.1 MG/DL  
GLUCOSE, POC  Collection Time: 20  8:11 AM  
 Result Value Ref Range Glucose (POC) 115 (H) 70 - 110 mg/dL GLUCOSE, POC Collection Time: 08/02/20 12:07 PM  
Result Value Ref Range Glucose (POC) 178 (H) 70 - 110 mg/dL GLUCOSE, POC Collection Time: 08/02/20  4:02 PM  
Result Value Ref Range Glucose (POC) 229 (H) 70 - 110 mg/dL

## 2020-08-02 NOTE — PROGRESS NOTES
Infectious Disease progress Note Reason: COVID-19 pneumonia Current abx Prior abx Solumedrol since 7/22 Pip/tazo since 7/27 Ceftriaxone 7/21-7/24 
azithromycin since 7/21/20-7/27/20 Lines:  
 
 
Assessment : 
 
89 with a hx of HTN, MV disorder, hyperlipidemia, and DM type 2 who presented to the ED on 7/21/20 by her daughter because she was sleeping too much. Labs on 7/23-ferritin 656, CRP 8.6, procalcitonin 0.17, d-dimer 3.99 Labs on 7/24-ferritin 603, CRP 5, procalcitonin 0.25, d-dimer 2.15 Labs on 7/27-ferritin 408, CRP 1.5, procalcitonin 0.2, d-dimer 0.90 
 labs on 7/28-ferritin 308, CRP 1.1, procalcitonin 0.1, d-dimer 1.22 Labs on 7/29-ferritin 290, CRP 0.8, procalcitonin less than 0.05, d-dimer 1.0 Labs on 7/30- ferritin 290, procalcitonin <0.05, d-dimer 1.06 Labs on 7/31-ferritin 257, procalcitonin less than 0.05, CRP 0.6, d-dimer 0.90 Labs on 8/2-ferritin 205, procalcitonin less than 0.05, CRP 0.4, d-dimer 0.7 Positive rapid COVID-19 test 7/21/2020 CT chest 7/19, cxr 7/19- Extensive patchy multifocal airspace disease Clinical presentation consistent with COVID-19 pneumonia (confirmed). Altered mental status on admission likely metabolic encephalopathy from COVID-19 infection Elevated D-dimer concerning for hypercoagulable state secondary to COVID-19 infection. Improved hypoxia. Improved mentation. CT head negative for acute stroke. Waxing and waning mental status may be secondary to delirium. Remains confused. Recommendations: 
 
1. D/c pip/tazo after pm dose 2. Continue Solumedrol-40 mg IV q 24 hour -may switch to p.o. prednisone taper when patient can take p.o. 
3.  Anticoagulation per primary team 
4. Monitor inflammatory markers daily-CRP, ferritin, procalcitonin, d-dimer 5. Titrate oxygen as tolerated 6. Await family's decision about further goals of care. Prog: poor. We will sign off. I will be on vacation till 8/9/20. Dr. Laura Jefferson will be covering for me during this time. She can be reached @ [phone] Above plan was discussed in details with primary team.  
 
 
HPI: 
 
 
Patient was confused at the time of my evaluation. She did not answer any questions. Detailed review of system not feasible. 
 
 
home Medication List  
 Details  
doxazosin (CARDURA) 8 mg tablet Take 8 mg by mouth nightly. indapamide (LOZOL) 1.25 mg tablet Take  by mouth daily. escitalopram oxalate (LEXAPRO) 10 mg tablet Take 10 mg by mouth daily. losartan (COZAAR) 100 mg tablet Take 100 mg by mouth daily. hydrALAZINE (APRESOLINE) 50 mg tablet Take 50 mg by mouth three (3) times daily. atorvastatin (LIPITOR) 40 mg tablet Take 1 Tab by mouth daily. Qty: 90 Tab, Refills: 3  
  
ergocalciferol (ERGOCALCIFEROL) 50,000 unit capsule Take 50,000 Units by mouth every seven (7) days. atenolol (TENORMIN) 100 mg tablet Take 100 mg by mouth two (2) times a day. insulin glargine (LANTUS SOLOSTAR) 100 unit/mL (3 mL) pen 10 Units by SubCUTAneous route daily. insulin aspart (NOVOLOG) 100 unit/mL injection 6 Units by SubCUTAneous route Before breakfast, lunch, and dinner. aspirin 81 mg tablet Take 81 mg by mouth.  
  
sitaGLIPtin (JANUVIA) 100 mg tablet Take 100 mg by mouth daily. Current Facility-Administered Medications Medication Dose Route Frequency  LORazepam (ATIVAN) injection 1 mg  1 mg IntraVENous Q6H PRN  
 dextrose 5 % - 0.45% NaCl infusion  75 mL/hr IntraVENous CONTINUOUS  
 hydrALAZINE (APRESOLINE) tablet 25 mg  25 mg Oral TID  methylPREDNISolone (PF) (SOLU-MEDROL) injection 40 mg  40 mg IntraVENous Q24H  
 doxazosin (CARDURA) tablet 8 mg  8 mg Oral BID  piperacillin-tazobactam (ZOSYN) 2.25 g in 0.9% sodium chloride (MBP/ADV) 50 mL MBP  2.25 g IntraVENous Q6H  
 enoxaparin (LOVENOX) injection 70 mg  70 mg SubCUTAneous Q24H  hydrALAZINE (APRESOLINE) 20 mg/mL injection 20 mg  20 mg IntraVENous Q6H PRN  
 cloNIDine (CATAPRES) 0.1 mg/24 hr patch 1 Patch  1 Patch TransDERmal every Monday  pantoprazole (PROTONIX) tablet 40 mg  40 mg Oral ACB  acetaminophen (TYLENOL) tablet 650 mg  650 mg Oral Q6H PRN  
 amLODIPine (NORVASC) tablet 10 mg  10 mg Oral DAILY  aspirin chewable tablet 81 mg  81 mg Oral DAILY  atorvastatin (LIPITOR) tablet 40 mg  40 mg Oral DAILY  insulin glargine (LANTUS) injection 10 Units  10 Units SubCUTAneous QHS  insulin lispro (HUMALOG) injection   SubCUTAneous AC&HS  
 glucose chewable tablet 16 g  4 Tab Oral PRN  
 glucagon (GLUCAGEN) injection 1 mg  1 mg IntraMUSCular PRN  
 dextrose (D50W) injection syrg 12.5-25 g  25-50 mL IntraVENous PRN  zinc sulfate (ZINCATE) 220 (50) mg capsule 1 Cap  1 Cap Oral DAILY  cholecalciferol (VITAMIN D3) capsule 5,000 Units  5,000 Units Oral DAILY  ascorbic acid (vitamin C) (VITAMIN C) tablet 1,000 mg  1,000 mg Oral DAILY  melatonin tablet 3 mg  3 mg Oral QHS PRN  
 sodium chloride (NS) flush 5-40 mL  5-40 mL IntraVENous Q8H  
 sodium chloride (NS) flush 5-40 mL  5-40 mL IntraVENous PRN Allergies: Minoxidil Temp (24hrs), Av.8 °F (36.6 °C), Min:97.4 °F (36.3 °C), Max:98.1 °F (36.7 °C) Visit Vitals /58 (BP 1 Location: Right arm, BP Patient Position: At rest) Pulse 77 Temp 98.1 °F (36.7 °C) Resp 22 Ht 5' 6\" (1.676 m) Wt 83 kg (182 lb 14.4 oz) SpO2 97% BMI 29.52 kg/m² ROS: Unable to obtain Physical Exam: 
 
General:   laying on the bed,  Not communicative Skin:   no rashes or skin lesions noted on limited exam  
HEENT:  Normocephalic, atraumatic, PERRL, EOMI, no scleral icterus or pallor; no conjunctival hemmohage;     Neck supple, no bruits. Lungs:   non-labored, bilaterally clear to aspiration- no crackles wheezes rales or rhonchi Heart:  RRR, s1 and s2; no  rubs or gallops, no edema, + pedal pulses Abdomen:  soft, non-distended,  Non-tender Genitourinary:  deferred Extremities:   no clubbing, cyanosis; no joint effusions or swelling;  no erythema of LE Neurologic:  Moves all 4 extremities Psychiatric:   unable to assess Labs: Results:  
Chemistry Recent Labs 08/02/20 
4318 08/01/20 
0129 07/31/20 
3842 * 113* 111* * 157* 158* K 3.4* 3.6 3.9 * 129* 129* CO2 23 19* 22 BUN 28* 41* 45* CREA 1.61* 1.79* 1.74* CA 7.7* 8.5 8.8 AGAP 4 9 7 BUCR 17 23* 26* CBC w/Diff No results for input(s): WBC, RBC, HGB, HCT, PLT, GRANS, LYMPH, EOS, HGBEXT, HCTEXT, PLTEXT, HGBEXT, HCTEXT, PLTEXT in the last 72 hours. Microbiology No results for input(s): CULT in the last 72 hours. RADIOLOGY: 
 
All available imaging studies/reports in [facility] for this admission were reviewed Total time spent >35 minutes. High complexity decision making was performed during the evaluation of this patient at high risk for decompensation with multiple organ involvement Above mentioned total time spent on reviewing the case/medical record/data/notes/EMR/patient examination/documentation/coordinating care with nurse/consultants, exclusive of procedures with complex decision making performed and > 50% time spent in face to face evaluation. Dr. Luana Mark, Infectious Disease Specialist 
[phone] August 2, 2020 
10:28 AM

## 2020-08-02 NOTE — PROGRESS NOTES
Problem: Falls - Risk of 
Goal: *Absence of Falls Description: Document Anatoly Vivas Fall Risk and appropriate interventions in the flowsheet. Outcome: Progressing Towards Goal 
Note: Fall Risk Interventions: 
Mobility Interventions: Bed/chair exit alarm Mentation Interventions: Bed/chair exit alarm Medication Interventions: Bed/chair exit alarm Elimination Interventions: Bed/chair exit alarm Problem: Patient Education: Go to Patient Education Activity Goal: Patient/Family Education Outcome: Progressing Towards Goal 
  
Problem: Diabetes Self-Management Goal: *Disease process and treatment process Description: Define diabetes and identify own type of diabetes; list 3 options for treating diabetes. Outcome: Progressing Towards Goal 
Goal: *Incorporating nutritional management into lifestyle Description: Describe effect of type, amount and timing of food on blood glucose; list 3 methods for planning meals. Outcome: Progressing Towards Goal 
Goal: *Incorporating physical activity into lifestyle Description: State effect of exercise on blood glucose levels. Outcome: Progressing Towards Goal 
Goal: *Developing strategies to promote health/change behavior Description: Define the ABC's of diabetes; identify appropriate screenings, schedule and personal plan for screenings. Outcome: Progressing Towards Goal 
Goal: *Using medications safely Description: State effect of diabetes medications on diabetes; name diabetes medication taking, action and side effects. Outcome: Progressing Towards Goal 
Goal: *Monitoring blood glucose, interpreting and using results Description: Identify recommended blood glucose targets  and personal targets. Outcome: Progressing Towards Goal 
Goal: *Prevention, detection, treatment of acute complications Description: List symptoms of hyper- and hypoglycemia; describe how to treat low blood sugar and actions for lowering  high blood glucose level. Outcome: Progressing Towards Goal 
Goal: *Prevention, detection and treatment of chronic complications Description: Define the natural course of diabetes and describe the relationship of blood glucose levels to long term complications of diabetes. Outcome: Progressing Towards Goal 
Goal: *Developing strategies to address psychosocial issues Description: Describe feelings about living with diabetes; identify support needed and support network Outcome: Progressing Towards Goal 
Goal: *Sick day guidelines Outcome: Progressing Towards Goal 
  
Problem: Patient Education: Go to Patient Education Activity Goal: Patient/Family Education Outcome: Progressing Towards Goal 
  
Problem: Pressure Injury - Risk of 
Goal: *Prevention of pressure injury Description: Document Anthony Scale and appropriate interventions in the flowsheet. Outcome: Progressing Towards Goal 
Note: Pressure Injury Interventions: 
Sensory Interventions: Assess need for specialty bed Moisture Interventions: Check for incontinence Q2 hours and as needed Activity Interventions: Pressure redistribution bed/mattress(bed type) Mobility Interventions: HOB 30 degrees or less Nutrition Interventions: Document food/fluid/supplement intake Friction and Shear Interventions: HOB 30 degrees or less Problem: Patient Education: Go to Patient Education Activity Goal: Patient/Family Education Outcome: Progressing Towards Goal 
  
Problem: Non-Violent Restraints Goal: *Removal from restraints as soon as assessed to be safe Outcome: Progressing Towards Goal 
Goal: *No harm/injury to patient while restraints in use Outcome: Progressing Towards Goal 
Goal: *Patient's dignity will be maintained Outcome: Progressing Towards Goal 
Goal: *Patient Specific Goal (EDIT GOAL, INSERT TEXT) Outcome: Progressing Towards Goal 
Goal: Non-violent Restaints:Standard Interventions Outcome: Progressing Towards Goal 
 Goal: Non-violent Restraints:Patient Interventions Outcome: Progressing Towards Goal 
Goal: Patient/Family Education Outcome: Progressing Towards Goal 
  
Problem: Patient Education: Go to Patient Education Activity Goal: Patient/Family Education Outcome: Progressing Towards Goal 
  
Problem: Patient Education: Go to Patient Education Activity Goal: Patient/Family Education Outcome: Progressing Towards Goal 
  
Problem: Patient Education: Go to Patient Education Activity Goal: Patient/Family Education Outcome: Progressing Towards Goal 
  
Problem: Nutrition Deficit Goal: *Optimize nutritional status Outcome: Progressing Towards Goal 
  
Problem: Non-Violent Restraints Goal: *Removal from restraints as soon as assessed to be safe Outcome: Progressing Towards Goal 
Goal: *No harm/injury to patient while restraints in use Outcome: Progressing Towards Goal 
Goal: *Patient's dignity will be maintained Outcome: Progressing Towards Goal 
Goal: *Patient Specific Goal (EDIT GOAL, INSERT TEXT) Outcome: Progressing Towards Goal 
Goal: Non-violent Restaints:Standard Interventions Outcome: Progressing Towards Goal 
Goal: Non-violent Restraints:Patient Interventions Outcome: Progressing Towards Goal 
Goal: Patient/Family Education Outcome: Progressing Towards Goal

## 2020-08-02 NOTE — PROGRESS NOTES
conducted a Follow up consultation and Spiritual Assessment for Smooth Avendano, who is a 80 y.o.,female. The  provided the following Interventions: 
Continued the relationship of care and support. Listened empathically. Offered assurance of continued prayer on patients behalf. Chart reviewed. The following outcomes were achieved: 
Patient expressed gratitude for 's visit. Assessment: 
There are no further spiritual or Taoism issues which require Spiritual Care Services interventions at this time. Plan: 
Chaplains will continue to follow and will provide pastoral care on an as needed/requested basis.  recommends bedside caregivers page  on duty if patient shows signs of acute spiritual or emotional distress. [address] [place] [address] Spiritual Care  
[phone]

## 2020-08-03 NOTE — CONSULTS
Palliative Medicine Consult DR. CONTE'S [facility]: [phone] ([phone]) [facility]: [phone] [facility]: [phone] Patient Name: Audrey Christianson YOB: 1929 Date of Initial Consult: 2020, follow up 2020, 2020, 2020, 2020, 2020, 8/3/2020 Reason for Consult: Care decisions Requesting Provider: Carla Nicolas MD 
Primary Care Physician: Shelby Antunez MD 
  
 SUMMARY:  
Audrey Christianson is a 80y.o. year old female with a past history of HTN, MV disorder, hyperlipidemia, and DM type 2 who was admitted on 2020 from home with a diagnosis of NSTEMI and COVID-19 Pneumonia. Current medical issues leading to Palliative Medicine involvement include: Care decisions. 8/3/2020 To preserve PPE, update on patient's condition was obtained from the bedside RN. She said patient is not eating, confused, yelling at times, with soft wrist restraints in place on both hands. DNR/DNI with tube feedings 2020 calm at time we saw. Spoke with nurse agitated last night with yelling out. Spoke with family including daughter Jena Waite and grand daughters. No decision on comfort measures. DNR/DNI  
 
2020 yelling out at times. Confused. Poor po intake 2020 agitated at times, calling for sister who is . Long conversation with daughter Jena Waite, granddaughter [facility]. Discussed comfort option. They are discussing. 2020 seen this am around 1100. High flow donned. Alert eyes open. Spoke with daughter Katerin Servin and son Chinedu Juarez via phone. DNR/DNI  
 
2020 comfortable appearing. Calm, in NAD  
 PALLIATIVE DIAGNOSES:  
1.Goals of care 2. Non-ST elevation MI 
3. Multifocal pneumonia 4. COVID-19 infection PLAN:  
8/3/2020 Follow up: To preserve PPE, patient's current condition was discussed with bedside RN.  She said patient remains confused, not eating, yelling at times, with soft wrist restraints in place on both hands. Patient's family Jermaine Ware, granddaughter and Sagrario Cano, daughter) called to let us know of their decision about tube feeding. They want to start  feeding her via the naso-gastric tube temporarily. They are hoping that patient's confusion will clear up if she is able to get nutrition. They said they had a video  call yesterday and heard patient yelling and they thought that patient is responding to them. The benefits and burdens of tube feeding were discussed with the family. Information on tube feeding will be emailed to the family by POOJA Otto RN. Dr. Lien Farr was made aware of the family's decision on tube feeding. DNR/DNI with tube feedings. 
 
(please see below for previous conversations) 7/31/2020  Follow up on Ms Trudy Jalloh. She is lying in bed quiet this AM. Discussed with nurse agitated last night received ativan. Long conversation with daughter Sagrario Cano grand daughters Meek Peterson and Nasir Guadarrama. Clinical update given, including unfortunately despite best treatment her mentation is not improving nor is her appetite. Discussed aggressive care moving forward, nutrition will need to be addressed including feeding tube placement. We shared the burdens of these efforts at some length with family. Comfort measures discussed at some length with family. We asked family to consider what Clint Davis would want and tell them to do if she were able and understanding her illness and how this affects her quality of life. Family did share they would not be able to care for patient at home as there is limited family available. No care decisions were made today. Goals of care DNR/DNI limited inventions, feeding tube to be decided. Further care decisions to be decided on by family. Family asked for time to re discuss with all family members. ( please see below for previous conversations) 2020 follow up this am. To preserve PPE viewed through monitor as she is COVID  19+ . Discussed with RN. Remains agitated at times. Yelling out. MAR reviewed Ativan 0.5 at 0100 this am, received x 2 yesterday. Despite best treatment she continues to be confused agitated not eating. Appreciate conversations by attending with oscar Chase. She indicated to him she would like comfort measures here in hospital, but would be unable to care for her mother at home. Spoke with grand daughter Bhakti Washburn ( at [facility] request I call her) Reviewed medical condition at some detail. During conversation attending spoke with patient's daughter Tia Zambranochchely who indicated she does not wish for her mother to suffer and wishes to embark on a more  comfort directed approach. Discussed with Bhakti Washburn who asked we continue current level of care so she can speak with her mother to ensure she completley understood conversation. Message left with attending on above. Comfort measures discussed again in detail with Bhakti Wasbhurn. Goals of care DNR/DNI  
 
 
 
.2020 follow up along with Ms Gunjan Hernandez RN. Agitated, calling for her  sister Shaji Chavis, \" take my hand Sakshi\" Family was able to participate in video ( daughter Fabby Mcclellan, grand daughter Bhakti Washburn, and other family members) They were visibly upset and could be patient's distress. After video participated in family conference via phone with the above attendees. Updated on overall condition. Honest but compassionate discussion. Unfortunately despite best treatment and efforts she is declining, not eating, confused agitated. All likely due to the effects of COVD 19 and MI. All questions answered to the best of our ability. Discussed moving to comfort measures, symptom management, vs continuing current course. Hospice at home also discussed. Difficult discussions with family. We offered support in this difficult time.  Shared with family unfortunately Ms Gutierrez's prognosis is poor and although difficult to predict her time maybe short. Family wishes to consider all options. Currently no change in care decisions, continue treatment. Goals of care DNR/DNI limited interventions. Continue current level of care. Attending updated on conversation. 7/27/2020 follow up on Ms Alfredito Bragg. Noted on requiring high flow oxygen. Seen through window due to COVID 19 isolation. At time of our visit around 1100 she appeared calm, eyes open moving in bed. Discussed with BSRN no issues at that time. Poor po. Spoke with daughter Joseph Durán, and son Susan Guerrero via phone. Updated on clinical condition as of 1100. Re addressed goals of care discussed with both Sara and Jimmy DNR/DNI. Ms Triana Ed and Ms Elisabeth Winston, ANIBAL were witnesses to conversation. Plan on video conference with family at 9:30 tomorrow. We also shared with daughter, with advanced age and COVID 23 clinical condition can be very fluid, meaning her condition can change rapidly. Family is very hopeful and prayerful she will recover. Goals of care DNR/DNI continue current medical treatment. Please note at time of our call to the family not aware of code stroke being called. Attending and BSRN aware of code change. 7/24/2020 follow up along with Ms Elaine Quinn RN. To preserve PPE patient evaluated via video monitor. She is calm. Spoke with RN calm for the most part. She remains confused. Spoke with another [place] daughter Qi Doshi. Ms Kathleen Block also shared the call with her friend Sofia Ruffin who is palliative RN. We again addressed goals of care burdens of resuscitation, with advanced age  And the effect on her quality of life. All very understanding of information and they are continuing discussions which we highly encouraged. We have also shared with the family the possibility she may not return to her baseline level of functioning or cognition. Goals of care full code with full interventions. Goals of care: 
    Saw patient today, she is awake, alert, moving all extremities spontaneously and to purpose. She is trying to remove medical equipments and monitors which are attached to her. She appears to be confused. She is not in any distress. Called Chelsey Dubon, son of patient when unable to contact daughter, Kolton Robins. Chelsey Dubon said to talk to his sister, Kolton Robins about patient. We were able to talk to Kolton Robins and patient's granddaughter today. They said that patient is very active and independent with her ADL prior to this admission. They denied noticing confusion or any other mental issues with patient. They said that patient is mentally alert and very capable of deciding for herself prior to admission. They also said patient is able to walk and goes to the grocery store. Granddaughter said patient and her family has not had any discussion about AMD and goals of care. Discussed the benefits and burdens of goals of care with the granddaughter and with Ms. Kolton Robins. Social: has multiple recent deaths in the family ( son, nephew); one of her son is sick and in the hospital, per granddaughter. 2. Non-ST elevation MI: 
    Patient is on telemetry monitor and on IV Heparin 3. Multifocal pneumonia:  
    Patient is on O2 at 4 liters via NC with O2 saturation above 90%. She has no shortness of breath. 4. COVID-19 infection: 
    Rapid COVID-19 test on 7/21/2020 was detected. TREATMENT PREFERENCES:  
Code Status: DNR/DNI Advance Care Planning: 
[] [facility] Interdisciplinary Team has updated the ACP Navigator with [address] and Patient Capacity Primary Decision Maker (Legal next of kin): Has no MPOA. Pt does not have an Advance Directive on file in EMR and is not able to complete one currently. Legal Next of Kin would remain as the medical decision maker at this time since Ms. Davis Price is confused.   Pt is a  and has two living adult children. Health care decision making will fall to a consensus of Maribel Antunez (daughter) and Ludwig Gutierrez(son) Call placed to Ludwig Myrick, he requested we contact his sister Sherry Pacheco she is the caregiver and makes all the decisions. Levar Nolan, daughter - home number: [phone], mobile number: [phone], e-mail: [email or web address]. Medical Interventions: Limited additional interventions Other: As far as possible, the palliative care team has discussed with patient / health care proxy about goals of care / treatment preferences for patient. HISTORY:  
 
History obtained from: Chart, Daughter, Granddaughter CHIEF COMPLAINT: NSTEMI, COVID - 19 infection HPI/SUBJECTIVE: The patient is:  
[] Verbal and participatory [x] Non-participatory due to: confusion Clinical Pain Assessment (nonverbal scale for nonverbal patients):  
Patient doesn't appear to be having pain. She is focused on removing the medical equipments and monitors attached to her. Activity (Movement): Lying quietly, normal position Duration: for how long has pt been experiencing pain (e.g., 2 days, 1 month, years) Frequency: how often pain is an issue (e.g., several times per day, once every few days, constant) FUNCTIONAL ASSESSMENT:  
 
Palliative Performance Scale (PPS):  30% PPS: 30 
 
ECOG 
ECOG Status : Completely disabled PSYCHOSOCIAL/SPIRITUAL SCREENING:  
  
Any spiritual / Congregational concerns: unable to assess 
[] Yes /  [] No 
 
Caregiver Burnout: 
[] Yes /  [] No /  [x] No Caregiver Present Anticipatory grief assessment: unable to assess 
[] Normal  / [] Maladaptive REVIEW OF SYSTEMS:  
 
Positive and pertinent negative findings in ROS are noted above in HPI. The following systems were [x] reviewed / [] unable to be reviewed as noted in HPI Other findings are noted below.  
Systems: constitutional, ears/nose/mouth/throat, respiratory, gastrointestinal, genitourinary, musculoskeletal, integumentary, neurologic, psychiatric, endocrine. Positive findings noted below. Modified ESAS Completed by: provider Fatigue: 0 Drowsiness: 0 Depression: 0 Pain: 0 Anxiety: 3 Dyspnea: 0 Stool Occurrence(s): 2 PHYSICAL EXAM:  
 
Wt Readings from Last 3 Encounters:  
08/03/20 84.2 kg (185 lb 9.6 oz) 01/26/17 82.6 kg (182 lb)  
08/25/16 83 kg (183 lb) Blood pressure 130/57, pulse 72, temperature 98.7 °F (37.1 °C), resp. rate 20, height 5' 6\" (1.676 m), weight 84.2 kg (185 lb 9.6 oz), SpO2 100 %. Pain: 
Pain Scale 1: PAINAD (Advanced Dementia) Pain Intensity 1: 1 Pain Intervention(s) 1: Medication (see MAR) Last bowel movement: none recorded To preserve PPE as patient is COVID + PE is limited Constitutional: calm when we viewed face serene Respiratory: breathing not labored, on nasal cannula at 6 liters HISTORY:  
 
Principal Problem: 
  Pneumonia due to 2019 novel coronavirus (7/26/2020) Active Problems: 
  NSTEMI (non-ST elevated myocardial infarction) ([facility] [address] (7/20/2020) PNA (pneumonia) (7/21/2020) Goals of care, counseling/discussion () COVID-19 () Past Medical History:  
Diagnosis Date  Essential hypertension, benign  Mitral valve disorders(424.0) Moderate MR  
 Other and unspecified hyperlipidemia  Other specified cardiac dysrhythmias(427.89) Non-sustained VT on Holter in past  
 Type II or unspecified type diabetes mellitus without mention of complication, not stated as uncontrolled History reviewed. No pertinent surgical history. Family History Problem Relation Age of Onset  Heart Disease Other Positive family history of ischemic heart disease. History reviewed, no pertinent family history. Social History Tobacco Use  Smoking status: Never Smoker  Smokeless tobacco: Never Used Substance Use Topics  Alcohol use: No  
 
Allergies Allergen Reactions  Minoxidil Other (comments) edema Current Facility-Administered Medications Medication Dose Route Frequency  LORazepam (ATIVAN) injection 1 mg  1 mg IntraVENous Q6H PRN  
 dextrose 5 % - 0.45% NaCl infusion  50 mL/hr IntraVENous CONTINUOUS  
 hydrALAZINE (APRESOLINE) tablet 25 mg  25 mg Oral TID  methylPREDNISolone (PF) (SOLU-MEDROL) injection 40 mg  40 mg IntraVENous Q24H  
 doxazosin (CARDURA) tablet 8 mg  8 mg Oral BID  enoxaparin (LOVENOX) injection 70 mg  70 mg SubCUTAneous Q24H  hydrALAZINE (APRESOLINE) 20 mg/mL injection 20 mg  20 mg IntraVENous Q6H PRN  
 cloNIDine (CATAPRES) 0.1 mg/24 hr patch 1 Patch  1 Patch TransDERmal every Monday  pantoprazole (PROTONIX) tablet 40 mg  40 mg Oral ACB  acetaminophen (TYLENOL) tablet 650 mg  650 mg Oral Q6H PRN  
 amLODIPine (NORVASC) tablet 10 mg  10 mg Oral DAILY  aspirin chewable tablet 81 mg  81 mg Oral DAILY  atorvastatin (LIPITOR) tablet 40 mg  40 mg Oral DAILY  insulin glargine (LANTUS) injection 10 Units  10 Units SubCUTAneous QHS  insulin lispro (HUMALOG) injection   SubCUTAneous AC&HS  
 glucose chewable tablet 16 g  4 Tab Oral PRN  
 glucagon (GLUCAGEN) injection 1 mg  1 mg IntraMUSCular PRN  
 dextrose (D50W) injection syrg 12.5-25 g  25-50 mL IntraVENous PRN  zinc sulfate (ZINCATE) 220 (50) mg capsule 1 Cap  1 Cap Oral DAILY  cholecalciferol (VITAMIN D3) capsule 5,000 Units  5,000 Units Oral DAILY  ascorbic acid (vitamin C) (VITAMIN C) tablet 1,000 mg  1,000 mg Oral DAILY  melatonin tablet 3 mg  3 mg Oral QHS PRN  
 sodium chloride (NS) flush 5-40 mL  5-40 mL IntraVENous Q8H  
 sodium chloride (NS) flush 5-40 mL  5-40 mL IntraVENous PRN  
 
 
 LAB AND IMAGING FINDINGS:  
 
Lab Results Component Value Date/Time WBC 12.5 07/29/2020 05:49 AM  
 HGB 11.4 (L) 07/29/2020 05:49 AM  
 PLATELET 900 30/38/1976 05:49 AM  
 
Lab Results Component Value Date/Time Sodium 154 (H) 08/03/2020 03:16 AM  
 Potassium 3.3 (L) 08/03/2020 03:16 AM  
 Chloride 127 (H) 08/03/2020 03:16 AM  
 CO2 22 08/03/2020 03:16 AM  
 BUN 28 (H) 08/03/2020 03:16 AM  
 Creatinine 1.65 (H) 08/03/2020 03:16 AM  
 Calcium 8.3 (L) 08/03/2020 03:16 AM  
 Magnesium 2.1 07/23/2020 05:29 AM  
  
Lab Results Component Value Date/Time Alk. phosphatase 64 07/26/2020 04:31 AM  
 Protein, total 6.7 07/26/2020 04:31 AM  
 Albumin 2.6 (L) 07/26/2020 04:31 AM  
 Globulin 4.1 (H) 07/26/2020 04:31 AM  
 
Lab Results Component Value Date/Time INR 1.4 (H) 07/23/2020 05:29 AM  
 Prothrombin time 16.8 (H) 07/23/2020 05:29 AM  
 aPTT 27.5 07/28/2020 03:14 AM  
  
Lab Results Component Value Date/Time Ferritin 184 08/03/2020 03:16 AM  
  
No results found for: PH, PCO2, PO2 No components found for: [place] Lab Results Component Value Date/Time  07/21/2020 02:03 AM  
 CK - MB 4.6 (H) 07/19/2020 08:10 PM  
  
 
   
 
Total time: 15 minutes Counseling / coordination time, spent as noted above: 10 minutes 
> 50% counseling / coordination: Yes with grand William Rojo and  daughter Corinne Mcelroy Prolonged service was provided for  []30 min   []75 min in face to face time in the presence of the patient, spent as noted above. Time Start:  
Time End:  
Note: this can only be billed with 43904 (initial) or 32779 (follow up). If multiple start / stop times, list each separately.

## 2020-08-03 NOTE — DIABETES MGMT
GLYCEMIC CONTROL: 
 
80 y.o. patient with history of type 2 diabetes mellitus. Noted: 
NSTEMI 
COVID-19 
T2DM. A1c 6.9% (7/21/2020) POC BG readings on 8/02/2020: 229, 232 POC BG on 8/03/2020 at time of review: 100, 105 Fasting BG 8/03/2020: 136 Recommendation: cont current insulin orders: lantus and sliding scale lispro. Patient is currently on IV Solumedrol 40 mg every 12 hours. Current diabetes medications: 
Lantus insulin 10 untis daily at bedtime Sliding scale lispro insulin ACHS. Very resistant dose. Diabetes medications: Unverified. Attempted to contact patient in room but no answer. Also attempted to contact her daughter, Liu Temple, ph: [phone], but no answer also SoloStar (lantus) pen insulin 10 units daily Novolog meal time insulin 6 units TID AC Januvia 100 mg daily 
  
Diet: cardiac pureed, consistent carb 1800kcal plus nutr suppl Chapincito Sarabia RN [facility] Pager: [phone]

## 2020-08-03 NOTE — PROGRESS NOTES
Patient unable to understand and/or complete the \"Important Message from [facility]". Reviewed document with patient's daughter Oumar Baum) at phone number [phone] telephonically and addressed questions. Mrs. Syed Kim states she understand her mother's Medicare right to appeal.  She was given [employer]'s number [phone] for future reference. Original, with verbal signature noted, placed in patient's chart.

## 2020-08-03 NOTE — PROGRESS NOTES
Attempted times 2 for NGT placement. Patient uncooperative and resisting. MD notified no new orders given.

## 2020-08-03 NOTE — PROGRESS NOTES
Bedside and Verbal shift change report given to ZINA Jaquez (oncoming nurse) by Meli Simpson RN (offgoing nurse). Report included the following information SBAR and Kardex.

## 2020-08-03 NOTE — PROGRESS NOTES
Nutrition Assessment Type and Reason for Visit: [facility] Nutrition Recommendations/Plan: - Monitor ability to tolerate oral diet versus need for enteral nutrition support.     
 
Nutrition Assessment:  Not eating, no plan for feeding tube placement per MD. Family to decide on goals of care per MD. 
 
Malnutrition Assessment: 
Malnutrition Status: Severe malnutrition Estimated Daily Nutrient Needs: 
Energy (kcal):  4484-9794 Protein (g):  62-77 Fluid (ml/day):  6456-9066 Nutrition Related Findings:  BM PTA versus 7/26 per chart Current Nutrition Therapies: DIET NUTRITIONAL SUPPLEMENTS Breakfast, Lunch, Dinner; [employer] DIET CARDIAC Pureed; Consistent Carb 1800kcal 
 
Anthropometric Measures: 
· Height:  5' 6\" (167.6 cm) · Current Body Wt:  84 kg (185 lb 3 oz) · BMI: 29.9 Nutrition Diagnosis:  
· Inadequate oral intake related to cognitive or neurological impairment, swallowing difficulty as evidenced by intake 0-25% · Unintended weight loss related to cognitive or neurological impairment, inadequate protein-energy intake as evidenced by (10 lb, 5.5% weight loss x month) Nutrition Intervention: 
Food and/or Nutrient Delivery: Continue current diet(pending decision on goals of care) Nutrition Education and Counseling: No recommendations at this time Coordination of Nutrition Care: Continued inpatient monitoring Goals: 
Nutritional needs will be met through adequate oral intake or nutrition support within the next 7 days. Nutrition Monitoring and Evaluation:  
Food/Nutrient Intake Outcomes: Food and nutrient intake, Supplement intake, Diet advancement/tolerance Physical Signs/Symptoms Outcomes: Chewing or swallowing, Nutrition focused physical findings, Meal time behavior Discharge Planning: Too soon to determine Electronically signed by Mikel Parson RD, [address]  on 8/3/2020 at 2:12 PM 
 
Contact Number: [phone]

## 2020-08-03 NOTE — PROGRESS NOTES
Discharge planning Chart reviewed. Palliative following. CM will continue to assist with transitional needs ALIDA Maki, RN Pager # [phone] Care Manager

## 2020-08-03 NOTE — PROGRESS NOTES
[facility] Patient: Ki Aguirre Age: 80 y.o. : 2/15/1929 MR#: [id number] SSN: [id number] Date: 8/3/2020 Subjective/24-hour events:  
  
Patient with eyes closed, per nurse, not eating. Assessment:  
 
1. COVID-19 infection with COVID-19 pneumonia 2. Acute hypoxic respiratory failure requiring 02 supplementation 3. NSTEMI cardiology following 4. ZION on CKD stage III- improved 5. Acute metabolic encephalopathy/delirium 6. Sinus bradycardia 7. Mild acute diastolic CHF 8. Type II DM 
9. Hypertension 10. Abnormal cognition, suspect underlying dementia 11. Advanced age 12-Hypernatremia on D5 1/2 nl saline, sodium trending up Plan: 
-Continue to follow sodium and hydrate, change to D5 
-Palliative care, cardiology and ID following  
-Status post Abx per ID- zosyn d7/7 
-Change steroid to oral, cont protonix 
-Cont ASA, statin  
-Cont clonidine patch, BB dc'ed     
-Cont ativan prn for agitation 
-Vitamin and mineral supplementation as ordered.   
-Cont lantus and SSI w/accuchecks  
-FU inflammatory markers  
-PT, OT 
-Aspiration precautions Palliative following with family and continuing discussions on [address], discussed with palliative this afternoon, decision made by family to give NGT trial. NGT order placed. Case discussed with:  [x]Patient  [x]Family  [x]Nursing  []Case Management DVT Prophylaxis:  [x]Lovenox  []Hep SQ  []SCDs  []Coumadin   []On Heparin gtt Diet: Cardiac, modified Code Status: DNR Contact: Ronal Barnett (daughter)   [phone] Disposition: Continue current care Objective:  
VS:  
Visit Vitals /57 (BP 1 Location: Left arm, BP Patient Position: At rest) Pulse 72 Temp 98.7 °F (37.1 °C) Resp 20 Ht 5' 6\" (1.676 m) Wt 84.2 kg (185 lb 9.6 oz) SpO2 100% BMI 29.96 kg/m² Tmax/24hrs: Temp (24hrs), Av.3 °F (36.8 °C), Min:98 °F (36.7 °C), Max:98.7 °F (37.1 °C) Intake/Output Summary (Last 24 hours) at 8/3/2020 1625 Last data filed at 8/3/2020 6023 Gross per 24 hour Intake 1822.5 ml Output  Net 1822.5 ml  
 
 
General: Nontoxic appearing, makes incomprehensible sounds, somnolent, lethargic, unresponsive today Cardiovascular: RRR Pulmonary: wo increased wob. GI:  Abdomen soft, nontender. Extremities: Warm, no edema or ischemia. Neuro: Somnolent, unresponsive today Labs:   
Recent Results (from the past 24 hour(s)) GLUCOSE, POC Collection Time: 08/02/20  8:36 PM  
Result Value Ref Range Glucose (POC) 232 (H) 70 - 110 mg/dL FERRITIN Collection Time: 08/03/20  3:16 AM  
Result Value Ref Range Ferritin 184 8 - 388 NG/ML  
C REACTIVE PROTEIN, QT Collection Time: 08/03/20  3:16 AM  
Result Value Ref Range C-Reactive protein 0.7 (H) 0 - 0.3 mg/dL PROCALCITONIN Collection Time: 08/03/20  3:16 AM  
Result Value Ref Range Procalcitonin <0.05 ng/mL D DIMER Collection Time: 08/03/20  3:16 AM  
Result Value Ref Range D DIMER 0.82 (H) <0.46 ug/ml(FEU) METABOLIC PANEL, BASIC Collection Time: 08/03/20  3:16 AM  
Result Value Ref Range Sodium 154 (H) 136 - 145 mmol/L Potassium 3.3 (L) 3.5 - 5.5 mmol/L Chloride 127 (H) 100 - 111 mmol/L  
 CO2 22 21 - 32 mmol/L Anion gap 5 3.0 - 18 mmol/L Glucose 136 (H) 74 - 99 mg/dL BUN 28 (H) 7.0 - 18 MG/DL Creatinine 1.65 (H) 0.6 - 1.3 MG/DL  
 BUN/Creatinine ratio 17 12 - 20 GFR est AA 35 (L) >60 ml/min/1.73m2 GFR est non-AA 29 (L) >60 ml/min/1.73m2 Calcium 8.3 (L) 8.5 - 10.1 MG/DL  
GLUCOSE, POC Collection Time: 08/03/20  7:55 AM  
Result Value Ref Range Glucose (POC) 100 70 - 110 mg/dL GLUCOSE, POC Collection Time: 08/03/20 12:15 PM  
Result Value Ref Range Glucose (POC) 105 70 - 110 mg/dL

## 2020-08-03 NOTE — PROGRESS NOTES
helped the patient Misty Anaya, who is a 80 y.o.,female, connect with her family with Zoom Video Connection. Patient did not respond to the family during the video. Assessment: 
There are no further spiritual or Worship issues which require Spiritual Care Services interventions at this time. Plan: 
Chaplains will continue to follow and will provide pastoral care on an as needed/requested basis. Jose Zurita

## 2020-08-03 NOTE — PROGRESS NOTES
Notified patient's daughter Peggy Valerio) about NGT placement being unsuccessful. Daughter was understanding and not wanting to put her mom through any trauma.

## 2020-08-03 NOTE — PROGRESS NOTES
Pt remains in restraints thru the shift. Pt constantly moaning out without sleep and pulling against restraints. PRN ativan administered to reduce agitation with no effect. Will cont to monitor Problem: Falls - Risk of 
Goal: *Absence of Falls Description: Document Ang Anthony Fall Risk and appropriate interventions in the flowsheet. Outcome: Progressing Towards Goal 
Note: Fall Risk Interventions: 
Mobility Interventions: Bed/chair exit alarm Mentation Interventions: Bed/chair exit alarm Medication Interventions: Bed/chair exit alarm Elimination Interventions: Bed/chair exit alarm Problem: Patient Education: Go to Patient Education Activity Goal: Patient/Family Education Outcome: Progressing Towards Goal 
  
Problem: Diabetes Self-Management Goal: *Disease process and treatment process Description: Define diabetes and identify own type of diabetes; list 3 options for treating diabetes. Outcome: Progressing Towards Goal 
Goal: *Incorporating nutritional management into lifestyle Description: Describe effect of type, amount and timing of food on blood glucose; list 3 methods for planning meals. Outcome: Progressing Towards Goal 
Goal: *Incorporating physical activity into lifestyle Description: State effect of exercise on blood glucose levels. Outcome: Progressing Towards Goal 
Goal: *Developing strategies to promote health/change behavior Description: Define the ABC's of diabetes; identify appropriate screenings, schedule and personal plan for screenings. Outcome: Progressing Towards Goal 
Goal: *Using medications safely Description: State effect of diabetes medications on diabetes; name diabetes medication taking, action and side effects. Outcome: Progressing Towards Goal 
Goal: *Monitoring blood glucose, interpreting and using results Description: Identify recommended blood glucose targets  and personal targets.  
Outcome: Progressing Towards Goal 
 Goal: *Prevention, detection, treatment of acute complications Description: List symptoms of hyper- and hypoglycemia; describe how to treat low blood sugar and actions for lowering  high blood glucose level. Outcome: Progressing Towards Goal 
Goal: *Prevention, detection and treatment of chronic complications Description: Define the natural course of diabetes and describe the relationship of blood glucose levels to long term complications of diabetes. Outcome: Progressing Towards Goal 
Goal: *Developing strategies to address psychosocial issues Description: Describe feelings about living with diabetes; identify support needed and support network Outcome: Progressing Towards Goal 
Goal: *Sick day guidelines Outcome: Progressing Towards Goal 
  
Problem: Patient Education: Go to Patient Education Activity Goal: Patient/Family Education Outcome: Progressing Towards Goal 
  
Problem: Pressure Injury - Risk of 
Goal: *Prevention of pressure injury Description: Document Anthony Scale and appropriate interventions in the flowsheet. Outcome: Progressing Towards Goal 
Note: Pressure Injury Interventions: 
Sensory Interventions: Check visual cues for pain Moisture Interventions: Absorbent underpads Activity Interventions: Pressure redistribution bed/mattress(bed type) Mobility Interventions: Pressure redistribution bed/mattress (bed type) Nutrition Interventions: Document food/fluid/supplement intake Friction and Shear Interventions: HOB 30 degrees or less Problem: Patient Education: Go to Patient Education Activity Goal: Patient/Family Education Outcome: Progressing Towards Goal 
  
Problem: Non-Violent Restraints Goal: *Removal from restraints as soon as assessed to be safe Outcome: Progressing Towards Goal 
Goal: *No harm/injury to patient while restraints in use Outcome: Progressing Towards Goal 
Goal: *Patient's dignity will be maintained Outcome: Progressing Towards Goal 
 Goal: *Patient Specific Goal (EDIT GOAL, INSERT TEXT) Outcome: Progressing Towards Goal 
Goal: Non-violent Restaints:Standard Interventions Outcome: Progressing Towards Goal 
Goal: Non-violent Restraints:Patient Interventions Outcome: Progressing Towards Goal 
Goal: Patient/Family Education Outcome: Progressing Towards Goal 
  
Problem: Patient Education: Go to Patient Education Activity Goal: Patient/Family Education Outcome: Progressing Towards Goal 
  
Problem: Patient Education: Go to Patient Education Activity Goal: Patient/Family Education Outcome: Progressing Towards Goal 
  
Problem: Patient Education: Go to Patient Education Activity Goal: Patient/Family Education Outcome: Progressing Towards Goal 
  
Problem: Nutrition Deficit Goal: *Optimize nutritional status Outcome: Progressing Towards Goal 
  
Problem: Non-Violent Restraints Goal: *Removal from restraints as soon as assessed to be safe Outcome: Progressing Towards Goal 
Goal: *No harm/injury to patient while restraints in use Outcome: Progressing Towards Goal 
Goal: *Patient's dignity will be maintained Outcome: Progressing Towards Goal 
Goal: *Patient Specific Goal (EDIT GOAL, INSERT TEXT) Outcome: Progressing Towards Goal 
Goal: Non-violent Restaints:Standard Interventions Outcome: Progressing Towards Goal 
Goal: Non-violent Restraints:Patient Interventions Outcome: Progressing Towards Goal 
Goal: Patient/Family Education Outcome: Progressing Towards Goal

## 2020-08-03 NOTE — PROGRESS NOTES
[facility]: [phone] ([phone] [facility]: [phone] 
  
Palliative Medicine follow up phone call with team members Yonas Lange RN and Thalia Lewis NP to pt's family members; Jose Martin, Debra Forde and Shelley-granddaughter.  Ms. Kassi Conway is currently requiring high flow oxygen at 5 liters. Per BSRN, Pt had episodes of agitation overnight requiring use of Ativan, still moaning at times. Family was able to engage in a video conference with patient. Per family she was still not able to clearly speak with them but was responding to their voice appropriately but making moaning sounds. Family has decided to try a NG feeding tube in order to provide nutrition with the goal of improved cogitation. Palliative team members clarified the families care decision to  Proceed with the use of the NG tube for nutrition. They are aware of the benefits and burdens of the NG Tube including aspiration and patient removal. They are also aware the Ms. Kassi Conway will required continue soft restraints to prevent this. Family expressed that they believe Ms. Kassi Conway is grieving the loss of her son and brother and she will \"clear\" with time and nutrition. Palliative team provided very brief explanation of PEG tube procedure to family. Also explored the next steps if pt does not respond to NG nutrition. The benefits and burdens of  PEG tube were offered. Emailed Granddaughter, Debra Forde  Information including [employer] Facts and article \" Choosing Wisely\". Attending Updated on family's choice to proceed with NG tube feedings. Plan to continue current level of care. Palliative Medicine will continue to provide support.  
  
Yonas IRWIN, RN, [facility] Palliative Medicine Inpatient RN  [facility] Palliative COPE Line: [phone] ([phone])

## 2020-08-04 NOTE — PROGRESS NOTES
Problem: Falls - Risk of 
Goal: *Absence of Falls Description: Document Nilesh Lovett Fall Risk and appropriate interventions in the flowsheet. Outcome: Not Progressing Towards Goal 
Note: Fall Risk Interventions: 
Mobility Interventions: Patient to call before getting OOB Mentation Interventions: Bed/chair exit alarm, Reorient patient, Room close to nurse's station Medication Interventions: Patient to call before getting OOB, Teach patient to arise slowly Elimination Interventions: Call light in reach Problem: Patient Education: Go to Patient Education Activity Goal: Patient/Family Education Outcome: Not Progressing Towards Goal 
  
Problem: Diabetes Self-Management Goal: *Disease process and treatment process Description: Define diabetes and identify own type of diabetes; list 3 options for treating diabetes. Outcome: Not Progressing Towards Goal 
Goal: *Incorporating nutritional management into lifestyle Description: Describe effect of type, amount and timing of food on blood glucose; list 3 methods for planning meals. Outcome: Not Progressing Towards Goal 
Goal: *Incorporating physical activity into lifestyle Description: State effect of exercise on blood glucose levels. Outcome: Not Progressing Towards Goal 
Goal: *Developing strategies to promote health/change behavior Description: Define the ABC's of diabetes; identify appropriate screenings, schedule and personal plan for screenings. Outcome: Not Progressing Towards Goal 
Goal: *Using medications safely Description: State effect of diabetes medications on diabetes; name diabetes medication taking, action and side effects. Outcome: Not Progressing Towards Goal 
Goal: *Monitoring blood glucose, interpreting and using results Description: Identify recommended blood glucose targets  and personal targets. Outcome: Not Progressing Towards Goal 
Goal: *Prevention, detection, treatment of acute complications Description: List symptoms of hyper- and hypoglycemia; describe how to treat low blood sugar and actions for lowering  high blood glucose level. Outcome: Not Progressing Towards Goal 
Goal: *Prevention, detection and treatment of chronic complications Description: Define the natural course of diabetes and describe the relationship of blood glucose levels to long term complications of diabetes. Outcome: Not Progressing Towards Goal 
Goal: *Developing strategies to address psychosocial issues Description: Describe feelings about living with diabetes; identify support needed and support network Outcome: Not Progressing Towards Goal 
Goal: *Sick day guidelines Outcome: Not Progressing Towards Goal 
  
Problem: Patient Education: Go to Patient Education Activity Goal: Patient/Family Education Outcome: Not Progressing Towards Goal 
  
Problem: Pressure Injury - Risk of 
Goal: *Prevention of pressure injury Description: Document Anthony Scale and appropriate interventions in the flowsheet. Outcome: Not Progressing Towards Goal 
Note: Pressure Injury Interventions: 
Sensory Interventions: Assess changes in LOC Moisture Interventions: Absorbent underpads, Minimize layers Activity Interventions: PT/OT evaluation, Increase time out of bed Mobility Interventions: PT/OT evaluation Nutrition Interventions: Discuss nutritional consult with provider Friction and Shear Interventions: Minimize layers Problem: Patient Education: Go to Patient Education Activity Goal: Patient/Family Education Outcome: Not Progressing Towards Goal 
  
Problem: Non-Violent Restraints Goal: *Removal from restraints as soon as assessed to be safe Outcome: Not Progressing Towards Goal 
Goal: *No harm/injury to patient while restraints in use Outcome: Not Progressing Towards Goal 
Goal: *Patient's dignity will be maintained Outcome: Not Progressing Towards Goal 
 Goal: *Patient Specific Goal (EDIT GOAL, INSERT TEXT) Outcome: Not Progressing Towards Goal 
Goal: Non-violent Restaints:Standard Interventions Outcome: Not Progressing Towards Goal 
Goal: Non-violent Restraints:Patient Interventions Outcome: Not Progressing Towards Goal 
Goal: Patient/Family Education Outcome: Not Progressing Towards Goal 
  
Problem: Patient Education: Go to Patient Education Activity Goal: Patient/Family Education Outcome: Not Progressing Towards Goal 
  
Problem: Patient Education: Go to Patient Education Activity Goal: Patient/Family Education Outcome: Not Progressing Towards Goal 
  
Problem: Patient Education: Go to Patient Education Activity Goal: Patient/Family Education Outcome: Not Progressing Towards Goal 
  
Problem: Nutrition Deficit Goal: *Optimize nutritional status Outcome: Not Progressing Towards Goal 
  
Problem: Non-Violent Restraints Goal: *Removal from restraints as soon as assessed to be safe Outcome: Not Progressing Towards Goal 
Goal: *No harm/injury to patient while restraints in use Outcome: Not Progressing Towards Goal 
Goal: *Patient's dignity will be maintained Outcome: Not Progressing Towards Goal 
Goal: *Patient Specific Goal (EDIT GOAL, INSERT TEXT) Outcome: Not Progressing Towards Goal 
Goal: Non-violent Restaints:Standard Interventions Outcome: Not Progressing Towards Goal 
Goal: Non-violent Restraints:Patient Interventions Outcome: Not Progressing Towards Goal 
Goal: Patient/Family Education Outcome: Not Progressing Towards Goal

## 2020-08-04 NOTE — PROGRESS NOTES
Problem: Falls - Risk of 
Goal: *Absence of Falls Description: Document Manan Lacy Fall Risk and appropriate interventions in the flowsheet. Outcome: Progressing Towards Goal 
Note: Fall Risk Interventions: 
Mobility Interventions: Communicate number of staff needed for ambulation/transfer Mentation Interventions: Bed/chair exit alarm Medication Interventions: Bed/chair exit alarm Elimination Interventions: Toileting schedule/hourly rounds Problem: Patient Education: Go to Patient Education Activity Goal: Patient/Family Education Outcome: Progressing Towards Goal 
  
Problem: Diabetes Self-Management Goal: *Disease process and treatment process Description: Define diabetes and identify own type of diabetes; list 3 options for treating diabetes. Outcome: Progressing Towards Goal 
Goal: *Incorporating nutritional management into lifestyle Description: Describe effect of type, amount and timing of food on blood glucose; list 3 methods for planning meals. Outcome: Progressing Towards Goal 
Goal: *Incorporating physical activity into lifestyle Description: State effect of exercise on blood glucose levels. Outcome: Progressing Towards Goal 
Goal: *Developing strategies to promote health/change behavior Description: Define the ABC's of diabetes; identify appropriate screenings, schedule and personal plan for screenings. Outcome: Progressing Towards Goal 
Goal: *Using medications safely Description: State effect of diabetes medications on diabetes; name diabetes medication taking, action and side effects. Outcome: Progressing Towards Goal 
Goal: *Monitoring blood glucose, interpreting and using results Description: Identify recommended blood glucose targets  and personal targets. Outcome: Progressing Towards Goal 
Goal: *Prevention, detection, treatment of acute complications Description: List symptoms of hyper- and hypoglycemia; describe how to treat low blood sugar and actions for lowering  high blood glucose level. Outcome: Progressing Towards Goal 
Goal: *Prevention, detection and treatment of chronic complications Description: Define the natural course of diabetes and describe the relationship of blood glucose levels to long term complications of diabetes. Outcome: Progressing Towards Goal 
Goal: *Developing strategies to address psychosocial issues Description: Describe feelings about living with diabetes; identify support needed and support network Outcome: Progressing Towards Goal 
Goal: *Sick day guidelines Outcome: Progressing Towards Goal 
  
Problem: Patient Education: Go to Patient Education Activity Goal: Patient/Family Education Outcome: Progressing Towards Goal 
  
Problem: Pressure Injury - Risk of 
Goal: *Prevention of pressure injury Description: Document Anthony Scale and appropriate interventions in the flowsheet. Outcome: Progressing Towards Goal 
Note: Pressure Injury Interventions: 
Sensory Interventions: Assess changes in LOC Moisture Interventions: Absorbent underpads Activity Interventions: Assess need for specialty bed Mobility Interventions: Pressure redistribution bed/mattress (bed type) Nutrition Interventions: Document food/fluid/supplement intake Friction and Shear Interventions: HOB 30 degrees or less Problem: Patient Education: Go to Patient Education Activity Goal: Patient/Family Education Outcome: Progressing Towards Goal 
  
Problem: Non-Violent Restraints Goal: *Removal from restraints as soon as assessed to be safe Outcome: Progressing Towards Goal 
Goal: *No harm/injury to patient while restraints in use Outcome: Progressing Towards Goal 
Goal: *Patient's dignity will be maintained Outcome: Progressing Towards Goal 
Goal: *Patient Specific Goal (EDIT GOAL, INSERT TEXT) Outcome: Progressing Towards Goal 
Goal: Non-violent Restaints:Standard Interventions Outcome: Progressing Towards Goal 
Goal: Non-violent Restraints:Patient Interventions Outcome: Progressing Towards Goal 
Goal: Patient/Family Education Outcome: Progressing Towards Goal 
  
Problem: Patient Education: Go to Patient Education Activity Goal: Patient/Family Education Outcome: Progressing Towards Goal 
  
Problem: Patient Education: Go to Patient Education Activity Goal: Patient/Family Education Outcome: Progressing Towards Goal 
  
Problem: Patient Education: Go to Patient Education Activity Goal: Patient/Family Education Outcome: Progressing Towards Goal 
  
Problem: Nutrition Deficit Goal: *Optimize nutritional status Outcome: Progressing Towards Goal 
  
Problem: Non-Violent Restraints Goal: *Removal from restraints as soon as assessed to be safe Outcome: Progressing Towards Goal 
Goal: *No harm/injury to patient while restraints in use Outcome: Progressing Towards Goal 
Goal: *Patient's dignity will be maintained Outcome: Progressing Towards Goal 
Goal: *Patient Specific Goal (EDIT GOAL, INSERT TEXT) Outcome: Progressing Towards Goal 
Goal: Non-violent Restaints:Standard Interventions Outcome: Progressing Towards Goal 
Goal: Non-violent Restraints:Patient Interventions Outcome: Progressing Towards Goal 
Goal: Patient/Family Education Outcome: Progressing Towards Goal

## 2020-08-04 NOTE — PROGRESS NOTES
[facility] 
 
 
CARDIOLOGY PROGRESS NOTE 
RECS: 
 
 
 
1. NSTEMI- positive troponin-Continue medical management. Asa.statin. Not a candidate for any invasive cardiac work-up 2. Intermittent bradycardia-discontinued BB 3. Elevated D-dimer- on Lovenox sq daily. 4. COV-19 - rapid test 7/21/20 detected- being managed by medical team  
5. Hypertension-blood pressure is falling. Reduce medications per orders. May have to hold some medications depending on the pressures as per orders. 6. Hx of nonsustained ventricular tachycardia- no recurrence on this admission will monitor on telemetry 7. Hyperlipidemia- continue statin 8. Hypernatremia- NS changed to D5% 0.45 NS 9. Severe deconditioning- in the setting of above- poor po intake and AMS 10. 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 Poor prognosis. Waiting  for family to make final decision in goals of care. Palliative /hospice care consulted ASSESSMENT: 
Hospital Problems  Date Reviewed: 1/26/2017 Codes Class Noted POA * (Principal) Pneumonia due to 2019 novel coronavirus ICD-10-CM: U07.1, J12.89 ICD-9-CM: 480.8  7/26/2020 Unknown Goals of care, counseling/discussion ICD-10-CM: Z71.89 ICD-9-CM: V65.49  Unknown Unknown COVID-19 ICD-10-CM: U07.1 ICD-9-CM: 079.89  Unknown Unknown PNA (pneumonia) ICD-10-CM: J18.9 ICD-9-CM: 585  7/21/2020 Unknown NSTEMI (non-ST elevated myocardial infarction) ([facility] ICD-10-CM: I21.4 ICD-9-CM: 410.70  7/20/2020 Unknown SUBJECTIVE: 
Confused per nursing staff No respiratory distress. No edema. No eating OBJECTIVE: 
 
VS:  
Visit Vitals /63 Pulse 74 Temp (!) 94 °F (34.4 °C) Resp 18 Ht 5' 6\" (1.676 m) Wt 84.2 kg (185 lb 9.6 oz) SpO2 94% BMI 29.96 kg/m² Intake/Output Summary (Last 24 hours) at 8/4/2020 1300 Last data filed at 8/3/2020 2040 Gross per 24 hour Intake 1575 ml Output  Net 1575 ml TELE: sinus rhythm Limited physical exam to preserve PPE. Spoke with nursing staff 
 patient is confused and unresponsive to verbal commands PULM:  not using accessory muscles on O2 by NC 
 
EXT: Mild edema on upper and lower extremities per nurses Labs: Results:  
   
Chemistry Recent Labs 08/04/20 
7378 08/03/20 
0138 08/02/20 
2510 * 136* 367* * 154* 151*  
K 3.7 3.3* 3.4*  
* 127* 124* CO2 18* 22 23 BUN 26* 28* 28* CREA 1.69* 1.65* 1.61* CA 7.2* 8.3* 7.7* AGAP 10 5 4 BUCR 15 17 17 CBC w/Diff Recent Labs 08/04/20 
3984 WBC 8.3  
RBC 2.03* HGB 6.1*  
HCT 19.5* * Cardiac Enzymes No results for input(s): CPK, CKND1, BETSEY in the last 72 hours. No lab exists for component: Abelardo Rakers Coagulation No results for input(s): PTP, INR, APTT, INREXT, INREXT in the last 72 hours. Lipid Panel Lab Results Component Value Date/Time Cholesterol, total 108 07/22/2020 11:41 AM  
 HDL Cholesterol 34 (L) 07/22/2020 11:41 AM  
 LDL, calculated 43.8 07/22/2020 11:41 AM  
 VLDL, calculated 30.2 07/22/2020 11:41 AM  
 Triglyceride 151 (H) 07/22/2020 11:41 AM  
 CHOL/HDL Ratio 3.2 07/22/2020 11:41 AM  
  
BNP No results for input(s): BNPP in the last 72 hours. Liver Enzymes No results for input(s): TP, ALB, TBIL, AP in the last 72 hours. No lab exists for component: SGOT, GPT, DBIL Thyroid Studies No results found for: T4, T3U, TSH, TSHEXT, TSHEXT JEANNIE BowersC

## 2020-08-04 NOTE — PROGRESS NOTES
Spoke with daughter Ronal Barnett [phone] in regard to the needs for transfusion of her new drop Hgb/Hct. Also discussed about the possibility of COMFORT MEASURE ONLY. Toya Goodell asked to hold off on blood transfusion but she will have to take with her brothers whether or not to start comfort measure today. She asked to call back around 3PM today for further discussion.   
 
Man Pollock MD

## 2020-08-04 NOTE — PROGRESS NOTES
Upon assessment, Pt eyes are open for the first time that this nurse has observed. Pt moaning out. Attempted to try and give pt pudding. Pt opened her mouth at consumed cup of pudding. Pt did not fair well with jello as she was pocketing. Immediatly after consumption, pt stopped moaning and closed her eyes. This is the first time that this RN has observed pt actively sleeping. Will cont to monitor

## 2020-08-04 NOTE — PROGRESS NOTES
Hospitalist Progress Note Patient: Lisandro Andrew Age: 80 y.o. : 2/15/1929 MR#: [id number] SSN: [id number] Date/Time: 2020 2:53 PM 
 
DOA: 2020 PCP: Moses Wright MD 
 
Subjective: She has been hospitalized since her visit to ER on 2020. I spoke with her daughter today in regard to goal of care as patient has developed acute decrease in Hgb/Hct. Family has discussed among themselves has agreed to forgo transfusion. They has confirmed with Palliative team for Comfort measure only at this point. Interval Hospital Course: 
80 y.o female with HTN, hyperlipidemia, MV disorder, DM2 who initially brought to ER because she was more somnolent and sleeping more. In the ER, she was noted to have hyperglycemia with vague abdominal complaint. Further lab workup was concern for NSTEMI and she was started on heparin gtt. She was also tested for COVID-19 infection which was positive. She was admitted for acute hypoxia with COVID-19 pneumonia, NSTEMI. Cardiology was consulted for further care. ID was also consulted for further care. She continued on azithromycin, solumedrol. During her stay, she developed worsening hypoxia and agitation. Code S was called. Her antibiotic was switched to Zosyn due to concern for aspiration. CT head was negative for stroke or bleed. She continued to be somnolent, unable to maintain oral intake. She remained at poor prognosis. Palliative care team was consulted for further care. ROS: unable due to altered mentation Assessment/Plan:  
1. COVID-19 pneumonia 2. Acute respiratory failure with hypoxia due to COVID-19 infection 3. Acute metabolic encephalopathy associate with COVID-19 infection and metabolic change 4. ZION on CKD3, improved 5. Hypernatremia due to lack of free water intake 6. NSTEMI 7. Mild acute diastolic CHF 8. Bradycardia 9. Hyperglycemia with T2DM 10. Acute anemia, possible blood loss in the setting of Lovenox use 6.  Advanced age with underlying dementia 12. Hypokalemia Hold of all medications. Transition her to comfort measure only. Continue to have comfort feed. Comfort order set placed Spoke with family with clinical update, they asked to recheck COVID-19 test.  
Spoke with nursing for further care plan Palliative care team appreciated DNR/DNI Additional Notes:   
Time spent >30 minutes Case discussed with:  [x]Patient  [x]Family  [x]Nursing  [x]Case Management DVT Prophylaxis:  []Lovenox  []Hep SQ  []SCDs  []Coumadin   []On Heparin gtt Signed By: Mynor Shabazz MD   
 2020 2:53 PM  
  
 
 
 
 
Objective:  
VS:  
Visit Vitals /63 Pulse 74 Temp (!) 94 °F (34.4 °C) Resp 18 Ht 5' 6\" (1.676 m) Wt 84.2 kg (185 lb 9.6 oz) SpO2 94% BMI 29.96 kg/m² Tmax/24hrs: Temp (24hrs), Av.1 °F (35.6 °C), Min:94 °F (34.4 °C), Max:97.8 °F (36.6 °C) Intake/Output Summary (Last 24 hours) at 2020 1453 Last data filed at 8/3/2020 2040 Gross per 24 hour Intake 1575 ml Output  Net 1575 ml Tele:  
General:  Cooperative, Not in acute distress HEENT: PERRL, EOMI, supple neck, no JVD, dry oral mucosa Cardiovascular: S1S2 regular, no rub/gallop Pulmonary: air entry bilaterally, no wheezing, ++crackle GI:  Soft, non tender, non distended, +bs, no guarding Extremities:  No pedal edema, +distal pulses appreciated Neuro: unable to participate Additional:  
 
 
Current Facility-Administered Medications Medication Dose Route Frequency  ondansetron (ZOFRAN) injection 4 mg  4 mg IntraVENous Q4H PRN  
 LORazepam (INTENSOL) 2 mg/mL oral concentrate 0.5 mg  0.5 mg SubLINGual Q4H PRN  
 acetaminophen (TYLENOL) suppository 650 mg  650 mg Rectal Q4H PRN  
 bisacodyL (DULCOLAX) suppository 10 mg  10 mg Rectal DAILY PRN  
 morphine (ROXANOL) concentrated oral syringe 2.6 mg  2.6 mg SubLINGual Q4H PRN Lab/Data Review: 
Labs: Results: Chemistry Recent Labs 08/04/20 
9526 08/03/20 
5392 08/02/20 
7659 * 136* 367* * 154* 151*  
K 3.7 3.3* 3.4*  
* 127* 124* CO2 18* 22 23 BUN 26* 28* 28* CREA 1.69* 1.65* 1.61* BUCR 15 17 17 AGAP 10 5 4 CA 7.2* 8.3* 7.7* No results for input(s): TBIL, ALT, ALKP, TP, ALB, GLOB, AGRAT in the last 72 hours. No lab exists for component: SGOT  
CBC w/Diff Recent Labs 08/04/20 
7673 WBC 8.3  
RBC 2.03* HGB 6.1*  
HCT 19.5* MCV 96.1 MCH 30.0 MCHC 31.3  
RDW 16.9*  
* Coagulation No results for input(s): PTP, INR, APTT, INREXT in the last 72 hours. Iron/Ferritin Lab Results Component Value Date/Time Ferritin 223 08/04/2020 02:28 AM  
   
BNP Cardiac Enzymes Lab Results Component Value Date/Time  07/21/2020 02:03 AM  
 CK - MB 4.6 (H) 07/19/2020 08:10 PM  
 CK-MB Index 1.2 07/19/2020 08:10 PM  
 Troponin-I, QT 0.20 (H) 07/21/2020 07:45 AM  
 
  
Lactic Acid Thyroid Studies All Micro Results None Images: 
 
CT (Most Recent). CT Results (most recent): 
Results from [facility] Encounter encounter on 07/19/20 CT HEAD WO CONT Narrative CT of the head without contrast 
 
HISTORY:Code S. Altered level of consciousness. COMPARISON: None. TECHNIQUE: Helical axial scan to the head was performed from the skull base to 
the vertex without IV contrast administration. All CT scans at this facility performed using dose optimization techniques as 
appreciated to a performed exam, to include automated exposure control, 
adjustment of the mA and or KU according to patient size (including appropriate 
matching for site specific examination), or use of iterative reconstruction 
technique. FINDINGS: 
 
Motion artifact noted. Mild age-related global atrophy with mild ventricular 
prominence identified. There is normal gray-white matter differentiation. There is no evidence of acute intracranial hemorrhage, mass effect or midline shift 
identified. No hyperdense MCA sign. No skull fracture or extra axial fluid 
collections identified. Visualized sinuses and mastoid air cells appear 
unremarkable. Impression IMPRESSION: 
 
No acute intracranial hemorrhage or mass effect. Note: If clinical concern of acute stroke, CTA or MRI with diffusion weighted 
images is recommended for further evaluation. The wet read was provided to the floor nurse, Ms. Greta Giang, for the ordering 
physician by me upon the initial interpretation at approximately 1431 hours, 
confirmation received. Thank you for your referral.  
 
 
CT abd/pelv: 
FINDINGS:  
Limitations: The evaluation of the solid organs is limited due to the lack of IV 
contrast. Evaluation of the abdomen and pelvis is limited due to patient motion. 
  
Lower Chest: Multifocal groundglass opacities noted in the lower lobes. There is 
an area of consolidation in the right middle lobe abutting the major fissure. Cardiomegaly and small pericardial effusion is present. 
  
Peritoneum: No free air appreciated. No free fluid present. No fluid collections 
present. 
  
Liver: No focal lesion appreciated. 
  
Biliary/Gallbladder: No intrahepatic or extrahepatic biliary ductal dilatation 
appreciated. The gallbladder is partially obscured due to motion. No 
pericholecystic fluid or inflammation. 
  
Spleen: Unremarkable. 
  
Pancreas: No focal lesion appreciated. The pancreatic duct is unremarkable. No 
peripancreatic inflammation or adenopathy. 
  
: The adrenal glands are unremarkable. No urolithiasis. No perinephric fat 
stranding appreciated. No hydroureteronephrosis seen. No acute pathology in the 
bladder. 
  
GI: The stomach is unremarkable. The small bowel is without evidence of 
obstruction. No small bowel wall thickening. The mesentery is without inflammation or adenopathy. The appendix is not definitively identified. No 
pericolonic inflammation or wall thickening appreciated. 
  
Aorta and retroperitoneum: No aortic aneurysm seen. No periaortic adenopathy 
seen. No fluid collections in the retroperitoneum appreciated. 
  
Abdominal wall/Inguinal: Unremarkable  
  
Musculoskeletal: Degenerative changes most pronounced at L5-S1 are noted. 
  
IMPRESSION IMPRESSION:  
1.   Multifocal groundglass opacities in the lower lobes and consolidation in 
the right middle lobe. This could be seen in [place] as well as other pulmonary 
infections. Interstitial pneumonitis is also in the differential. 
  
No acute pathology appreciated in the abdomen or pelvis. However, evaluation is 
mildly limited due to patient motion. 
   
 
XRAY (Most Recent) EKG No results found for this or any previous visit. 2D ECHO 07/19/20 ECHO ADULT FOLLOW-UP OR LIMITED 07/24/2020 7/24/2020 Narrative · LV: Normal cavity size and systolic function (ejection fraction normal). Moderately increased wall thickness. Estimated left ventricular ejection  
fraction is 60 - 65%. Visually measured ejection fraction.  Wall motion:  
normal. 
   
  Signed by: Cristobal Hernández MD

## 2020-08-04 NOTE — CONSULTS
Palliative Medicine Consult DR. [facility]: [phone] ([phone]) [facility]: [phone] [facility]: [phone] Patient Name: Swetha Crespo YOB: 1929 Date of Initial Consult: 2020, follow up 2020, 2020, 2020, 2020, 2020, 8/3/2020, 2020 Reason for Consult: Care decisions Requesting Provider: Mckinley Hawkins MD 
Primary Care Physician: Junior Chávez MD 
  
 SUMMARY:  
Swetha Crespo is a 80y.o. year old female with a past history of HTN, MV disorder, hyperlipidemia, and DM type 2 who was admitted on 2020 from home with a diagnosis of NSTEMI and COVID-19 Pneumonia. Current medical issues leading to Palliative Medicine involvement include: Care decisions. 2020 To preserve PPE, patient was seen on the video camera and spoke with the bedside RN. Patient seems to be awake, moving her upper extremities spontaneously,  
moving her head from side to side spontaneously. The bedside RN said the patient remains confused and moaning at times; remains on high flow O2 at 5 liters; temperature is 94°F. H&H 6.1 g/dL/19.5% on 8/3/2020. DNR/DNI, comfort measures, no tube feedings, no blood transfusion. 8/3/2020 To preserve PPE, update on patient's condition was obtained from the bedside RN. She said patient is not eating, confused, yelling at times, with soft wrist restraints in place on both hands. DNR/DNI with tube feedings 2020 calm at time we saw. Spoke with nurse agitated last night with yelling out. Spoke with family including daughter Valeria Beltran and grand daughters. No decision on comfort measures. DNR/DNI  
 
2020 yelling out at times. Confused. Poor po intake 2020 agitated at times, calling for sister who is . Long conversation with daughter Valeria Beltran, granddaughter Alex Radcliffe. Discussed comfort option. They are discussing. 7/27/2020 seen this am around 1100. High flow donned. Alert eyes open. Spoke with daughter Carlo Sanchez and son Arun Guzman via phone. DNR/DNI  
 
7/24/2020 comfortable appearing. Calm, in NAD  
 PALLIATIVE DIAGNOSES:  
1.Goals of care 2. Non-ST elevation MI 
3. Multifocal pneumonia 4. COVID-19 infection PLAN:  
8/4/2020 Follow up: To preserve PPE, patient was seen via the video camera and spoke with the bedside RN. Patient seems to be awake, moving her upper extremities spontaneously, moving her head from side to side spontaneously. The bedside RN said the patient remains confused and moaning at times; remains on high flow O2 at 5 liters; temperature is 94°F. H&H 6.1 g/dL/19.5% on 8/3/2020. Spoke with Kierra Starkey, granddaughter and Guzman Nichole, daughter of patient via the telephone today. They were updated on patient's condition. Reassurance and emotional support given to family. Discussed and explained the goals, benefits and burdens of comfort care. POST form was discussed; that Ms. Carlo Sanchez, daughter of patient needs to sign it. The form will be emailed to Kierra Starkey, granddaughter and she will forward it to her mother for signature via docu-sign, then she will e-mail it back to us. I confirmed with the family that NO blood transfusion will be given. Dr. Rianna Broussard was made aware that family said NO to the blood transfusion; that they want patient to be on comfort care. He was also made aware that family is requesting for a repeat COVID-19 testing. Comfort care orders and IP Hospice referral were placed. BSRN and the  were notified of comfort care order. GOC: DNR/DNI, COMFORT CARE, no blood transfusion, no tube feedings. 
 
(please see below for previous conversations) 8/3/2020 Follow up: To preserve PPE, patient's current condition was discussed with bedside RN. She said patient remains confused, not eating, yelling at times, with soft wrist restraints in place on both hands. Patient's family Nona Wells, granddaughter and Olivia Davis, daughter) called to let us know of their decision about tube feeding. They want to start  feeding her via the naso-gastric tube temporarily. They are hoping that patient's confusion will clear up if she is able to get nutrition. They said they had a video  call yesterday and heard patient yelling and they thought that patient is responding to them. The benefits and burdens of tube feeding were discussed with the family. Information on tube feeding will be emailed to the family by POOJA Rueda RN. Dr. Pieter Love was made aware of the family's decision on tube feeding. DNR/DNI with tube feedings. 
 
(please see below for previous conversations) 7/31/2020  Follow up on Ms Alma Delia Tyson. She is lying in bed quiet this AM. Discussed with nurse agitated last night received ativan. Long conversation with daughter Amira Aggarwal grand daughters Balaji Carr and Yoselin Schroeder. Clinical update given, including unfortunately despite best treatment her mentation is not improving nor is her appetite. Discussed aggressive care moving forward, nutrition will need to be addressed including feeding tube placement. We shared the burdens of these efforts at some length with family. Comfort measures discussed at some length with family. We asked family to consider what Carlota Dailey would want and tell them to do if she were able and understanding her illness and how this affects her quality of life. Family did share they would not be able to care for patient at home as there is limited family available. No care decisions were made today. Goals of care DNR/DNI limited inventions, feeding tube to be decided. Further care decisions to be decided on by family. Family asked for time to re discuss with all family members. ( please see below for previous conversations) 7/30/2020 follow up this am. To preserve PPE viewed through monitor as she is COVID  19+ . Discussed with RN. Remains agitated at times. Yelling out. MAR reviewed Ativan 0.5 at 0100 this am, received x 2 yesterday. Despite best treatment she continues to be confused agitated not eating. Appreciate conversations by attending with daughter Carlo Sanchez. She indicated to him she would like comfort measures here in hospital, but would be unable to care for her mother at home. Spoke with grand daughter [facility] ( at [facility] request I call her) Reviewed medical condition at some detail. During conversation attending spoke with patient's daughter Carlo Sanchez who indicated she does not wish for her mother to suffer and wishes to embark on a more  comfort directed approach. Discussed with [facility] who asked we continue current level of care so she can speak with her mother to ensure she completley understood conversation. Message left with attending on above. [facility] Goals of care DNR/DNI .2020 follow up along with Ms [facility], RN. Agitated, calling for her  sister Za Hodgson, \" take my hand Sakshi\" Family was able to participate in video ( daughter Alida Conteh, grand [facility], and other family members) They were visibly upset and could be patient's distress. After video participated in family conference via phone with the above attendees. Updated on overall condition. Honest but compassionate discussion. Unfortunately despite best treatment and efforts she is declining, not eating, confused agitated. All likely due to the effects of COVD 19 and MI. All questions answered to the best of our ability. Discussed moving to comfort measures, symptom management, vs continuing current course. Hospice at home also discussed. Difficult discussions with family. We offered support in this difficult time. Shared with family unfortunately Ms Gutierrez's prognosis is poor and although difficult to predict her time maybe short. Family wishes to consider all options. Currently no change in care decisions, continue treatment. Goals of care DNR/DNI limited interventions. Continue current level of care. Attending updated on conversation. 7/27/2020 follow up on Ms Dave Yi. Noted on requiring high flow oxygen. Seen through window due to COVID 19 isolation. At time of our visit around 1100 she appeared calm, eyes open moving in bed. Discussed with BSRN no issues at that time. Poor po. Spoke with daughter Severiano Deiters, and son Bartolo Maldonado via phone. Updated on clinical condition as of 1100. Re addressed goals of care discussed with both Sara and Jimmy DNR/DNI. Ms Tenzin Acuna and Ms Jesica Hunter NP were witnesses to conversation. Plan on video conference with family at 9:30 tomorrow. We also shared with daughter, with advanced age and COVID 23 clinical condition can be very fluid, meaning her condition can change rapidly. Family is very hopeful and prayerful she will recover. Goals of care DNR/DNI continue current medical treatment. Please note at time of our call to the family not aware of code stroke being called. Attending and BSRN aware of code change. 7/24/2020 follow up along with Ms Chely Christiansen RN. To preserve PPE patient evaluated via video monitor. She is calm. Spoke with RN calm for the most part. She remains confused. Spoke with another Kitzmiller daughter Radha Burciaga. Ms Hui Light also shared the call with her friend Oksana Cartagena who is palliative RN. We again addressed goals of care burdens of resuscitation, with advanced age  And the effect on her quality of life. All very understanding of information and they are continuing discussions which we highly encouraged. We have also shared with the family the possibility she may not return to her baseline level of functioning or cognition. Goals of care full code with full interventions. Goals of care: Saw patient today, she is awake, alert, moving all extremities spontaneously and to purpose. She is trying to remove medical equipments and monitors which are attached to her. She appears to be confused. She is not in any distress. Called Francisco Germain, son of patient when unable to contact daughter, Tia Chase. Francisco Germain said to talk to his sister, Tia Zambranochchely about patient. We were able to talk to Tia Brochchely and patient's granddaughter today. They said that patient is very active and independent with her ADL prior to this admission. They denied noticing confusion or any other mental issues with patient. They said that patient is mentally alert and very capable of deciding for herself prior to admission. They also said patient is able to walk and goes to the grocery store. Granddaughter said patient and her family has not had any discussion about AMD and goals of care. Discussed the benefits and burdens of goals of care with the granddaughter and with Ms. Tia Chase. Social: has multiple recent deaths in the family ( son, nephew); one of her son is sick and in the hospital, per granddaughter. 2. Non-ST elevation MI: 
    Patient is on telemetry monitor and on IV Heparin 3. Multifocal pneumonia:  
    Patient is on O2 at 4 liters via NC with O2 saturation above 90%. She has no shortness of breath. 4. COVID-19 infection: 
    Rapid COVID-19 test on 7/21/2020 was detected. TREATMENT PREFERENCES:  
Code Status: DNR/DNI Advance Care Planning: 
[] [employer] Interdisciplinary Team has updated the ACP Navigator with [address] and Patient Capacity Primary Decision Maker (Legal next of kin): Has no MPOA. Pt does not have an Advance Directive on file in EMR and is not able to complete one currently. Legal Next of Kin would remain as the medical decision maker at this time since Ms. Demetra Cerna is confused. Pt is a  and has two living adult children. Health care decision making will fall to a consensus of Jayy Meyer (daughter) and Boris Gutierrez(son) Call placed to Boris Pierre, he requested we contact his sister Bianca Amaro she is the caregiver and makes all the decisions. Calvin Daugherty, daughter - home number: [phone], mobile number: [phone], e-mail: [email or web address]. Medical Interventions: Comfort measures Artificially Administered Nutrition: No feeding tube Other: As far as possible, the palliative care team has discussed with patient / health care proxy about goals of care / treatment preferences for patient. HISTORY:  
 
History obtained from: Chart, Daughter, Granddaughter CHIEF COMPLAINT: NSTEMI, COVID - 19 infection HPI/SUBJECTIVE: The patient is:  
[] Verbal and participatory [x] Non-participatory due to: confusion Clinical Pain Assessment (nonverbal scale for nonverbal patients):  
Patient moans at times per bedside RN Activity (Movement): Seeking attention through movement or slow, cautious movement Duration: for how long has pt been experiencing pain (e.g., 2 days, 1 month, years) Frequency: how often pain is an issue (e.g., several times per day, once every few days, constant) FUNCTIONAL ASSESSMENT:  
 
Palliative Performance Scale (PPS):  20% PPS: 20 
 
ECOG 
ECOG Status : Completely disabled PSYCHOSOCIAL/SPIRITUAL SCREENING:  
  
Any spiritual / Spiritism concerns: unable to assess 
[] Yes /  [] No 
 
Caregiver Burnout: 
[] Yes /  [] No /  [x] No Caregiver Present Anticipatory grief assessment: unable to assess 
[] Normal  / [] Maladaptive  REVIEW OF SYSTEMS:  
 
 Positive and pertinent negative findings in ROS are noted above in HPI. The following systems were [x] reviewed / [] unable to be reviewed as noted in HPI Other findings are noted below. Systems: constitutional, ears/nose/mouth/throat, respiratory, gastrointestinal, genitourinary, musculoskeletal, integumentary, neurologic, psychiatric, endocrine. Positive findings noted below. Modified ESAS Completed by: provider Fatigue: 4 Drowsiness: 0 Depression: 0 Pain: 3 Anxiety: 2 Dyspnea: 3 Stool Occurrence(s): 1 PHYSICAL EXAM:  
 
Wt Readings from Last 3 Encounters:  
08/03/20 84.2 kg (185 lb 9.6 oz) 01/26/17 82.6 kg (182 lb)  
08/25/16 83 kg (183 lb) Blood pressure 103/63, pulse 74, temperature (!) 94 °F (34.4 °C), resp. rate 18, height 5' 6\" (1.676 m), weight 84.2 kg (185 lb 9.6 oz), SpO2 94 %. Pain: 
Pain Scale 1: Numeric (0 - 10) Pain Intensity 1: 0 Pain Intervention(s) 1: Medication (see MAR) Last bowel movement: x 1 To preserve PPE as patient is COVID + PE is limited Constitutional: moving both upper extremities spontaneously, moving head from side to side Respiratory: breathing not labored, on high flow O2 at 5 liters HISTORY:  
 
Principal Problem: 
  Pneumonia due to 2019 novel coronavirus (7/26/2020) Active Problems: 
  NSTEMI (non-ST elevated myocardial infarction) ([facility] [address] (7/20/2020) PNA (pneumonia) (7/21/2020) Goals of care, counseling/discussion () COVID-19 () Past Medical History:  
Diagnosis Date  Essential hypertension, benign  Mitral valve disorders(424.0) Moderate MR  
 Other and unspecified hyperlipidemia  Other specified cardiac dysrhythmias(427.89) Non-sustained VT on Holter in past  
 Type II or unspecified type diabetes mellitus without mention of complication, not stated as uncontrolled History reviewed. No pertinent surgical history. Family History Problem Relation Age of Onset  Heart Disease Other Positive family history of ischemic heart disease. History reviewed, no pertinent family history. Social History Tobacco Use  Smoking status: Never Smoker  Smokeless tobacco: Never Used Substance Use Topics  Alcohol use: No  
 
Allergies Allergen Reactions  Minoxidil Other (comments) edema Current Facility-Administered Medications Medication Dose Route Frequency  ondansetron (ZOFRAN) injection 4 mg  4 mg IntraVENous Q4H PRN  
 LORazepam (INTENSOL) 2 mg/mL oral concentrate 0.5 mg  0.5 mg SubLINGual Q4H PRN  
 acetaminophen (TYLENOL) suppository 650 mg  650 mg Rectal Q4H PRN  
 bisacodyL (DULCOLAX) suppository 10 mg  10 mg Rectal DAILY PRN  
 morphine (ROXANOL) concentrated oral syringe 2.6 mg  2.6 mg SubLINGual Q4H PRN  
 
 
 LAB AND IMAGING FINDINGS:  
 
Lab Results Component Value Date/Time WBC 8.3 08/04/2020 02:28 AM  
 HGB 6.1 (L) 08/04/2020 02:28 AM  
 PLATELET 239 (L) 68/00/9934 02:28 AM  
 
Lab Results Component Value Date/Time Sodium 153 (H) 08/04/2020 02:28 AM  
 Potassium 3.7 08/04/2020 02:28 AM  
 Chloride 125 (H) 08/04/2020 02:28 AM  
 CO2 18 (L) 08/04/2020 02:28 AM  
 BUN 26 (H) 08/04/2020 02:28 AM  
 Creatinine 1.69 (H) 08/04/2020 02:28 AM  
 Calcium 7.2 (L) 08/04/2020 02:28 AM  
 Magnesium 2.1 07/23/2020 05:29 AM  
  
Lab Results Component Value Date/Time Alk. phosphatase 64 07/26/2020 04:31 AM  
 Protein, total 6.7 07/26/2020 04:31 AM  
 Albumin 2.6 (L) 07/26/2020 04:31 AM  
 Globulin 4.1 (H) 07/26/2020 04:31 AM  
 
Lab Results Component Value Date/Time INR 1.4 (H) 07/23/2020 05:29 AM  
 Prothrombin time 16.8 (H) 07/23/2020 05:29 AM  
 aPTT 27.5 07/28/2020 03:14 AM  
  
Lab Results Component Value Date/Time Ferritin 223 08/04/2020 02:28 AM  
  
No results found for: PH, PCO2, PO2 No components found for: [place] Lab Results Component Value Date/Time   07/21/2020 02:03 AM  
 CK - MB 4.6 (H) 07/19/2020 08:10 PM  
  
 
   
 
Total time: 25 minutes Counseling / coordination time, spent as noted above: 20 minutes 
> 50% counseling / coordination: Yes with granddaughter,Anabela Block and  Daughter, Joseph Durán Prolonged service was provided for  []30 min   []75 min in face to face time in the presence of the patient, spent as noted above. Time Start:  
Time End:  
Note: this can only be billed with 73301 (initial) or 13857 (follow up). If multiple start / stop times, list each separately.

## 2020-08-04 NOTE — PROGRESS NOTES
helped the patient Phyllis Gosselin, who is a 80 y.o.,female, connect with the family with Zoom Video Connection. Assessment: 
There are no further spiritual or Restorationist issues which require Spiritual Care Services interventions at this time. Plan: 
Chaplains will continue to follow and will provide pastoral care on an as needed/requested basis. Jake Shankar

## 2020-08-04 NOTE — PROGRESS NOTES
Bedside shift change report given to Rox Pak (oncoming nurse) by Nikolay Ley (offgoing nurse). Report included the following information SBAR, Kardex, Procedure Summary, Intake/Output and MAR.

## 2020-08-04 NOTE — PROGRESS NOTES
Palliative Medicine Consult [facility]: [phone] ([phone]) [facility]: [phone] 
  
Palliative Medicine follow up phone call with team members Eder Preciado RN and Carline Ramirez NP to pt's family members; Yee Barbour, Iman Sony and Shelley-granddaughter.  Ms. Terrance Khoury is currently requiring high flow oxygen at 5 liters.  Low Body temp on bear hugger,  and low HR50-40. Two unsuccessful attempts for NG tube placement were made, Due to this situation, Family has now decided to move pt to comfort measures here at the hospital. The interventions for comfort care at the hospital were explained and questions were answered. Comfort orders placed. Attending and BSRN informed. Family requested retest for COVID 19. Attending made aware. Palliative team members clarified the families care decision to move to comfort measures at the hospital.  
   
 
  Plan to transition to comfort measures at the hospital. Palliative Medicine will continue to provide support.  
  
Advanced Steps Advance Care Planning [facility] (Physician Orders for Scope of Treatment) Date of conversation: 8/4/2020   [address] [place] [address] Length (minutes): 45  
 
Participants: 
 [] Patient [x] Healthcare agent (already designated in existing ACP document) Name: Yee Barbour Relationship to Patient:Daughter Advanced Steps® ACP Facilitator: Lien Osborne RN Cardiopulmonary Resuscitation Order Elected for CPR:  []  Attempt Resuscitation [x]  Do Not Attempt Resuscitation When NOT in Cardiopulmonary Arrest, Order Elected:   
 
[x] Comfort Measures 
[] Limited Additional Interventions [] Full Interventions Artificially Administered Nutrition, Order Elected: 
 
[x] No Feeding Tube  
[] Feeding Tube for a defined trial period 
[] Feeding Tube long-term if indicated The following was provided (check all that apply): Healthcare Decision Information Cards: [] Help with Breathing Facts [x] Tube Feeding Facts [] CPR Facts  
  
[] Review of existing Advance Directive 
[] Assistance with Completion of New Advance Directive [x] Review of [place] POST Form Meeting Outcomes: 
 [] ACP discussion completed 
 [x] [place] POST form completed  Pending e-signature [x] [place] POST prepared for Provider review and signature 
 [x] Original placed on Chart, if in facility (form to be sent with patient at discharge) [x] Copy given to healthcare agent Pending e-signature [x] Copy scanned to electronic medical record Pending e-signature If ACP discussion not completed, last interview topic discussed: Referral to hospice Follow-up plan:   
 [x] Schedule follow-up conversation to continue planning 
 [] Referred individual to Provider for additional questions/concerns  
[] Advised patient/agent/surrogate to review completed POST form and update if needed with changes in condition, patient preferences or care setting  
 
[] This note routed to one or more involved healthcare providers Eder HECKN, RN, [facility] Palliative Medicine Inpatient RN [address]      
Palliative COPE Line: [phone] ([phone])

## 2020-08-05 NOTE — PROGRESS NOTES
Hospitalist Progress Note Patient: Eloy Mancilla Age: 80 y.o. : 2/15/1929 MR#: [id number] SSN: [id number] Date/Time: 2020 1:37 PM 
 
DOA: 2020 PCP: Alfred Mendoza MD 
 
Subjective:  
 
Patient is comfortable without respiratory distress. She remains awake and track this MD while in her room but no verbal response No overnight event. Interval Hospital Course: 
80 y.o female with HTN, hyperlipidemia, MV disorder, DM2 who initially brought to ER because she was more somnolent and sleeping more. In the ER, she was noted to have hyperglycemia with vague abdominal complaint. Further lab workup was concern for NSTEMI and she was started on heparin gtt. She was also tested for COVID-19 infection which was positive. She was admitted for acute hypoxia with COVID-19 pneumonia, NSTEMI. Cardiology was consulted for further care. ID was also consulted for further care. She continued on azithromycin, solumedrol. During her stay, she developed worsening hypoxia and agitation. Code S was called. Her antibiotic was switched to Zosyn due to concern for aspiration. CT head was negative for stroke or bleed. She continued to be somnolent, unable to maintain oral intake. She remained at poor prognosis. Palliative care team was consulted for further care. ROS: unable due to altered mentation Assessment/Plan:  
 
1. COVID-19 pneumonia 2. Acute respiratory failure with hypoxia due to COVID-19 infection 3. Acute metabolic encephalopathy associate with COVID-19 infection and metabolic change 4. ZION on CKD3, improved 5. Hypernatremia due to lack of free water intake 6. NSTEMI 7. Mild acute diastolic CHF 8. Bradycardia 9. Hyperglycemia with T2DM 10. Acute anemia, possible blood loss in the setting of Lovenox use 11. Advanced age with underlying dementia 12. Hypokalemia ON COMFORT MEASURE ONLY Hold of all medications. Spoke with family for daily update Continue to have comfort feed. Comfort order set placed Follow up on COVID-19 test.  
Spoke with nursing for further care plan Palliative care team appreciated DNR/DNI Additional Notes:   
Time spent >30 minutes Case discussed with:  [x]Patient  [x]Family  [x]Nursing  [x]Case Management DVT Prophylaxis:  []Lovenox  []Hep SQ  []SCDs  []Coumadin   []On Heparin gtt Signed By: Madi Moreno MD   
 2020 1:37 PM  
  
 
 
 
 
Objective:  
VS:  
Visit Vitals /82 (BP 1 Location: Left arm, BP Patient Position: At rest) Pulse 89 Temp (!) 94.3 °F (34.6 °C) Resp 18 Ht 5' 6\" (1.676 m) Wt 84.2 kg (185 lb 9.6 oz) SpO2 94% BMI 29.96 kg/m² Tmax/24hrs: Temp (24hrs), Av.3 °F (34.6 °C), Min:94.2 °F (34.6 °C), Max:94.3 °F (34.6 °C) Intake/Output Summary (Last 24 hours) at 2020 1337 Last data filed at 2020 0630 Gross per 24 hour Intake  Output 1 ml Net -1 ml Tele:  
General:  Cooperative, Not in acute distress HEENT: PERRL, EOMI, supple neck, no JVD, dry oral mucosa Cardiovascular: S1S2 regular, no rub/gallop Pulmonary: air entry bilaterally, no wheezing, ++crackle GI:  Soft, non tender, non distended, +bs, no guarding Extremities:  No pedal edema, +distal pulses appreciated Neuro: unable to participate Additional:  
 
 
Current Facility-Administered Medications Medication Dose Route Frequency  ondansetron (ZOFRAN) injection 4 mg  4 mg IntraVENous Q4H PRN  
 LORazepam (INTENSOL) 2 mg/mL oral concentrate 0.5 mg  0.5 mg SubLINGual Q4H PRN  
 acetaminophen (TYLENOL) suppository 650 mg  650 mg Rectal Q4H PRN  
 bisacodyL (DULCOLAX) suppository 10 mg  10 mg Rectal DAILY PRN  
 morphine (ROXANOL) concentrated oral syringe 2.6 mg  2.6 mg SubLINGual Q4H PRN Lab/Data Review: 
Labs: Results:  
   
Chemistry Recent Labs 20 
4865 20 
5761 * 136* * 154* K 3.7 3.3*  
* 127* CO2 18* 22  
 BUN 26* 28* CREA 1.69* 1.65* BUCR 15 17 AGAP 10 5 CA 7.2* 8.3* No results for input(s): TBIL, ALT, ALKP, TP, ALB, GLOB, AGRAT in the last 72 hours. No lab exists for component: SGOT  
CBC w/Diff Recent Labs 08/04/20 
2412 WBC 8.3  
RBC 2.03* HGB 6.1*  
HCT 19.5* MCV 96.1 MCH 30.0 MCHC 31.3  
RDW 16.9*  
* Coagulation No results for input(s): PTP, INR, APTT, INREXT, INREXT in the last 72 hours. Iron/Ferritin Lab Results Component Value Date/Time Ferritin 223 08/04/2020 02:28 AM  
   
BNP Cardiac Enzymes Lab Results Component Value Date/Time  07/21/2020 02:03 AM  
 CK - MB 4.6 (H) 07/19/2020 08:10 PM  
 CK-MB Index 1.2 07/19/2020 08:10 PM  
 Troponin-I, QT 0.20 (H) 07/21/2020 07:45 AM  
 
  
Lactic Acid Thyroid Studies All Micro Results None Images: 
 
CT (Most Recent). CT Results (most recent): 
Results from [facility] Encounter encounter on 07/19/20 CT HEAD WO CONT Narrative CT of the head without contrast 
 
HISTORY:Code S. Altered level of consciousness. COMPARISON: None. TECHNIQUE: Helical axial scan to the head was performed from the skull base to 
the vertex without IV contrast administration. All CT scans at this facility performed using dose optimization techniques as 
appreciated to a performed exam, to include automated exposure control, 
adjustment of the mA and or KU according to patient size (including appropriate 
matching for site specific examination), or use of iterative reconstruction 
technique. FINDINGS: 
 
Motion artifact noted. Mild age-related global atrophy with mild ventricular 
prominence identified. There is normal gray-white matter differentiation. There 
is no evidence of acute intracranial hemorrhage, mass effect or midline shift 
identified. No hyperdense MCA sign. No skull fracture or extra axial fluid 
collections identified. Visualized sinuses and mastoid air cells appear unremarkable. Impression IMPRESSION: 
 
No acute intracranial hemorrhage or mass effect. Note: If clinical concern of acute stroke, CTA or MRI with diffusion weighted 
images is recommended for further evaluation. The wet read was provided to the floor nurse, Ms. Surinder Blake, for the ordering 
physician by me upon the initial interpretation at approximately 1431 hours, 
confirmation received. Thank you for your referral.  
 
 
CT abd/pelv: 
FINDINGS:  
Limitations: The evaluation of the solid organs is limited due to the lack of IV 
contrast. Evaluation of the abdomen and pelvis is limited due to patient motion. 
  
Lower Chest: Multifocal groundglass opacities noted in the lower lobes. There is 
an area of consolidation in the right middle lobe abutting the major fissure. Cardiomegaly and small pericardial effusion is present. 
  
Peritoneum: No free air appreciated. No free fluid present. No fluid collections 
present. 
  
Liver: No focal lesion appreciated. 
  
Biliary/Gallbladder: No intrahepatic or extrahepatic biliary ductal dilatation 
appreciated. The gallbladder is partially obscured due to motion. No 
pericholecystic fluid or inflammation. 
  
Spleen: Unremarkable. 
  
Pancreas: No focal lesion appreciated. The pancreatic duct is unremarkable. No 
peripancreatic inflammation or adenopathy. 
  
: The adrenal glands are unremarkable. No urolithiasis. No perinephric fat 
stranding appreciated. No hydroureteronephrosis seen. No acute pathology in the 
bladder. 
  
GI: The stomach is unremarkable. The small bowel is without evidence of 
obstruction. No small bowel wall thickening. The mesentery is without 
inflammation or adenopathy. The appendix is not definitively identified. No 
pericolonic inflammation or wall thickening appreciated. 
  
Aorta and retroperitoneum: No aortic aneurysm seen. No periaortic adenopathy 
seen.   No fluid collections in the retroperitoneum appreciated. 
  
 Abdominal wall/Inguinal: Unremarkable  
  
Musculoskeletal: Degenerative changes most pronounced at L5-S1 are noted. 
  
IMPRESSION IMPRESSION:  
1.   Multifocal groundglass opacities in the lower lobes and consolidation in 
the right middle lobe. This could be seen in [place] as well as other pulmonary 
infections. Interstitial pneumonitis is also in the differential. 
  
No acute pathology appreciated in the abdomen or pelvis. However, evaluation is 
mildly limited due to patient motion. 
   
 
XRAY (Most Recent) EKG No results found for this or any previous visit. 2D ECHO 07/19/20 ECHO ADULT FOLLOW-UP OR LIMITED 07/24/2020 7/24/2020 Narrative · LV: Normal cavity size and systolic function (ejection fraction normal). Moderately increased wall thickness. Estimated left ventricular ejection  
fraction is 60 - 65%. Visually measured ejection fraction.  Wall motion:  
normal. 
   
  Signed by: Lluvia Mesa MD

## 2020-08-05 NOTE — HOSPICE
Hospice referral received. Chart review in process. Thank you for the referral to [facility]. If we can be of further assistance please contact [phone] Morgan Chacon RN [place] [address] [place], [facility] [address] 
[phone] Email: [email or web address]

## 2020-08-05 NOTE — PROGRESS NOTES
Problem: Falls - Risk of 
Goal: *Absence of Falls Description: Document Manan Lacy Fall Risk and appropriate interventions in the flowsheet. Outcome: Progressing Towards Goal 
Note: Fall Risk Interventions: 
Mobility Interventions: Bed/chair exit alarm, Patient to call before getting OOB, Utilize walker, cane, or other assistive device Mentation Interventions: Adequate sleep, hydration, pain control, Bed/chair exit alarm, More frequent rounding, Room close to nurse's station, Update white board Medication Interventions: Assess postural VS orthostatic hypotension, Patient to call before getting OOB, Teach patient to arise slowly Elimination Interventions: Call light in reach, Bed/chair exit alarm, Toileting schedule/hourly rounds Problem: Patient Education: Go to Patient Education Activity Goal: Patient/Family Education Outcome: Progressing Towards Goal 
  
Problem: Diabetes Self-Management Goal: *Disease process and treatment process Description: Define diabetes and identify own type of diabetes; list 3 options for treating diabetes. Outcome: Progressing Towards Goal 
Goal: *Incorporating nutritional management into lifestyle Description: Describe effect of type, amount and timing of food on blood glucose; list 3 methods for planning meals. Outcome: Progressing Towards Goal 
Goal: *Incorporating physical activity into lifestyle Description: State effect of exercise on blood glucose levels. Outcome: Progressing Towards Goal 
Goal: *Developing strategies to promote health/change behavior Description: Define the ABC's of diabetes; identify appropriate screenings, schedule and personal plan for screenings. Outcome: Progressing Towards Goal 
Goal: *Using medications safely Description: State effect of diabetes medications on diabetes; name diabetes medication taking, action and side effects.  
Outcome: Progressing Towards Goal 
 Goal: *Monitoring blood glucose, interpreting and using results Description: Identify recommended blood glucose targets  and personal targets. Outcome: Progressing Towards Goal 
Goal: *Prevention, detection, treatment of acute complications Description: List symptoms of hyper- and hypoglycemia; describe how to treat low blood sugar and actions for lowering  high blood glucose level. Outcome: Progressing Towards Goal 
Goal: *Prevention, detection and treatment of chronic complications Description: Define the natural course of diabetes and describe the relationship of blood glucose levels to long term complications of diabetes. Outcome: Progressing Towards Goal 
Goal: *Developing strategies to address psychosocial issues Description: Describe feelings about living with diabetes; identify support needed and support network Outcome: Progressing Towards Goal 
Goal: *Sick day guidelines Outcome: Progressing Towards Goal 
  
Problem: Patient Education: Go to Patient Education Activity Goal: Patient/Family Education Outcome: Progressing Towards Goal 
  
Problem: Pressure Injury - Risk of 
Goal: *Prevention of pressure injury Description: Document Anthony Scale and appropriate interventions in the flowsheet. Outcome: Progressing Towards Goal 
Note: Pressure Injury Interventions: 
Sensory Interventions: Assess changes in LOC, Assess need for specialty bed, Keep linens dry and wrinkle-free, Minimize linen layers Moisture Interventions: Absorbent underpads, Check for incontinence Q2 hours and as needed, Minimize layers Activity Interventions: Assess need for specialty bed, Pressure redistribution bed/mattress(bed type) Mobility Interventions: Assess need for specialty bed, Pressure redistribution bed/mattress (bed type) Nutrition Interventions: Document food/fluid/supplement intake, Offer support with meals,snacks and hydration Friction and Shear Interventions: Apply protective barrier, creams and emollients, Lift team/patient mobility team, Minimize layers Problem: Patient Education: Go to Patient Education Activity Goal: Patient/Family Education Outcome: Progressing Towards Goal 
  
Problem: Nutrition Deficit Goal: *Optimize nutritional status Outcome: Progressing Towards Goal

## 2020-08-05 NOTE — ROUTINE PROCESS
Bedside shift change report given to Milagros Santiago RN (oncoming nurse) by Joyceann Halsted, RN (offgoing nurse). Report included the following information SBAR, Kardex, Intake/Output, MAR, Recent Results and Med Rec Status.

## 2020-08-05 NOTE — PROGRESS NOTES
Palliative Medicine Consult DR. CONTES [facility]: [phone] [facility]: [phone] Palliative Medicine follow up with Darien Molina, and this writer. Ms. Candis Soto is lying in bed, awake, eyes open and picking at sheets. Pt appears restless currently. Spoke with BSRN and suggested a dose of Ativan at this time for restlessness. Comfort POST awaiting E-signature from family. Remains on comfort measures. DNR/DNI Comfort measures. Tanja IRWIN, RN, [facility] Palliative Medicine Inpatient DR. PELAYO [facility] Palliative [facility] Line: [phone])

## 2020-08-05 NOTE — ROUTINE PROCESS
Bedside shift change report given to Yimi Kramer RN (oncoming nurse) by Lacey Chua RN (offgoing nurse). Report included the following information SBAR, Kardex, Intake/Output, MAR, Recent Results and Med Rec Status.

## 2020-08-06 NOTE — PROGRESS NOTES
helped the patient Harmeet Freeman, who is a 80 y.o.,female, connect with the family with [employer] Connection. Assessment: 
There are no further spiritual or Gnosticist issues which require Spiritual Care Services interventions at this time. Plan: 
Chaplains will continue to follow and will provide pastoral care on an as needed/requested basis.  recommends bedside caregivers page  on duty if patient shows signs of acute spiritual or emotional distress. Ren[place] Chu

## 2020-08-06 NOTE — ROUTINE PROCESS
Received patient in bed, lethargic. oxygen /salter at 4 lpm via nasal cannula will continue to monitor. Report  Given by Kalyn Inman RN. 
 
 
4:42 PM 
Lorazepam not given. Patient is very lethargic, not waking up. Pharmacist informed. 7:32 PM 
Repositioned. Verbal shift change report given to Heidy Joseph RN (oncoming nurse) by Ric Mitchell (offgoing nurse). Report included the following information Kardex, ED Summary, Intake/Output and MAR.

## 2020-08-06 NOTE — PROGRESS NOTES
Problem: Falls - Risk of 
Goal: *Absence of Falls Description: Document [place] Fall Risk and appropriate interventions in the flowsheet. Outcome: Progressing Towards Goal 
Note: Fall Risk Interventions: 
Mobility Interventions: Bed/chair exit alarm, Patient to call before getting OOB, Utilize walker, cane, or other assistive device Mentation Interventions: Adequate sleep, hydration, pain control, Bed/chair exit alarm, More frequent rounding, Room close to nurse's station, Update white board Medication Interventions: Assess postural VS orthostatic hypotension, Patient to call before getting OOB, Teach patient to arise slowly Elimination Interventions: Call light in reach, Bed/chair exit alarm, Toileting schedule/hourly rounds Problem: Patient Education: Go to Patient Education Activity Goal: Patient/Family Education Outcome: Progressing Towards Goal 
  
Problem: Diabetes Self-Management Goal: *Disease process and treatment process Description: Define diabetes and identify own type of diabetes; list 3 options for treating diabetes. Outcome: Progressing Towards Goal 
Goal: *Incorporating nutritional management into lifestyle Description: Describe effect of type, amount and timing of food on blood glucose; list 3 methods for planning meals. Outcome: Progressing Towards Goal 
Goal: *Incorporating physical activity into lifestyle Description: State effect of exercise on blood glucose levels. Outcome: Progressing Towards Goal 
Goal: *Developing strategies to promote health/change behavior Description: Define the ABC's of diabetes; identify appropriate screenings, schedule and personal plan for screenings. Outcome: Progressing Towards Goal 
Goal: *Using medications safely Description: State effect of diabetes medications on diabetes; name diabetes medication taking, action and side effects.  
Outcome: Progressing Towards Goal 
 Goal: *Monitoring blood glucose, interpreting and using results Description: Identify recommended blood glucose targets  and personal targets. Outcome: Progressing Towards Goal 
Goal: *Prevention, detection, treatment of acute complications Description: List symptoms of hyper- and hypoglycemia; describe how to treat low blood sugar and actions for lowering  high blood glucose level. Outcome: Progressing Towards Goal 
Goal: *Prevention, detection and treatment of chronic complications Description: Define the natural course of diabetes and describe the relationship of blood glucose levels to long term complications of diabetes. Outcome: Progressing Towards Goal 
Goal: *Developing strategies to address psychosocial issues Description: Describe feelings about living with diabetes; identify support needed and support network Outcome: Progressing Towards Goal 
Goal: *Sick day guidelines Outcome: Progressing Towards Goal 
  
Problem: Pressure Injury - Risk of 
Goal: *Prevention of pressure injury Description: Document Anthony Scale and appropriate interventions in the flowsheet. Outcome: Progressing Towards Goal 
Note: Pressure Injury Interventions: 
Sensory Interventions: Assess changes in LOC, Assess need for specialty bed, Keep linens dry and wrinkle-free, Minimize linen layers Moisture Interventions: Absorbent underpads, Check for incontinence Q2 hours and as needed, Minimize layers Activity Interventions: Assess need for specialty bed, Pressure redistribution bed/mattress(bed type) Mobility Interventions: Assess need for specialty bed, Pressure redistribution bed/mattress (bed type) Nutrition Interventions: Document food/fluid/supplement intake, Offer support with meals,snacks and hydration Friction and Shear Interventions: Apply protective barrier, creams and emollients, Lift team/patient mobility team, Minimize layers Problem: Patient Education: Go to Patient Education Activity Goal: Patient/Family Education Outcome: Progressing Towards Goal 
  
Problem: Patient Education: Go to Patient Education Activity Goal: Patient/Family Education Outcome: Progressing Towards Goal 
  
Problem: Nutrition Deficit Goal: *Optimize nutritional status Outcome: Progressing Towards Goal

## 2020-08-06 NOTE — PROGRESS NOTES
Hospitalist Progress Note Patient: An Lucas Age: 80 y.o. : 2/15/1929 MR#: [id number] SSN: [id number] Date/Time: 2020 5:56 PM 
 
DOA: 2020 PCP: Mic Ya MD 
 
Subjective: She remains comfortable without respiratory distress. No overnight event. COVID-19 test is still positive Interval Hospital Course: 
80 y.o female with HTN, hyperlipidemia, MV disorder, DM2 who initially brought to ER because she was more somnolent and sleeping more. In the ER, she was noted to have hyperglycemia with vague abdominal complaint. Further lab workup was concern for NSTEMI and she was started on heparin gtt. She was also tested for COVID-19 infection which was positive. She was admitted for acute hypoxia with COVID-19 pneumonia, NSTEMI. Cardiology was consulted for further care. ID was also consulted for further care. She continued on azithromycin, solumedrol. During her stay, she developed worsening hypoxia and agitation. Code S was called. Her antibiotic was switched to Zosyn due to concern for aspiration. CT head was negative for stroke or bleed. She continued to be somnolent, unable to maintain oral intake. She remained at poor prognosis. Palliative care team was consulted for further care. ROS: unable due to altered mentation Assessment/Plan:  
 
1. COVID-19 pneumonia 2. Acute respiratory failure with hypoxia due to COVID-19 infection 3. Acute metabolic encephalopathy associate with COVID-19 infection and metabolic change 4. ZION on CKD3, improved 5. Hypernatremia due to lack of free water intake 6. NSTEMI 7. Mild acute diastolic CHF 8. Bradycardia 9. Hyperglycemia with T2DM 10. Acute anemia, possible blood loss in the setting of Lovenox use 11. Advanced age with underlying dementia 12. Hypokalemia ON COMFORT MEASURE ONLY Continue to have comfort feed. Comfort order set placed Spoke with nursing for further care plan Palliative care team appreciated DNR/DNI Additional Notes:   
Time spent >30 minutes Case discussed with:  [x]Patient  []Family  [x]Nursing  [x]Case Management DVT Prophylaxis:  []Lovenox  []Hep SQ  []SCDs  []Coumadin   []On Heparin gtt Signed By: Royce Escalante MD   
 2020 5:56 PM  
  
 
 
 
 
Objective:  
VS:  
Visit Vitals /58 (BP 1 Location: Right arm, BP Patient Position: At rest) Pulse 100 Temp 98.2 °F (36.8 °C) Resp 18 Ht 5' 6\" (1.676 m) Wt 83.1 kg (183 lb 4.8 oz) SpO2 100% BMI 29.59 kg/m² Tmax/24hrs: Temp (24hrs), Av.2 °F (36.2 °C), Min:95 °F (35 °C), Max:98.2 °F (36.8 °C) No intake or output data in the 24 hours ending 20 0102 Tele:  
General:  Cooperative, Not in acute distress HEENT: PERRL, EOMI, supple neck, no JVD, dry oral mucosa Cardiovascular: S1S2 regular, no rub/gallop Pulmonary: air entry bilaterally, no wheezing, ++crackle GI:  Soft, non tender, non distended, +bs, no guarding Extremities:  No pedal edema, +distal pulses appreciated Neuro: unable to participate Additional:  
 
 
Current Facility-Administered Medications Medication Dose Route Frequency  LORazepam (INTENSOL) 2 mg/mL oral concentrate 0.5 mg  0.5 mg SubLINGual TID  ondansetron (ZOFRAN) injection 4 mg  4 mg IntraVENous Q4H PRN  
 LORazepam (INTENSOL) 2 mg/mL oral concentrate 0.5 mg  0.5 mg SubLINGual Q4H PRN  
 acetaminophen (TYLENOL) suppository 650 mg  650 mg Rectal Q4H PRN  
 bisacodyL (DULCOLAX) suppository 10 mg  10 mg Rectal DAILY PRN  
 morphine (ROXANOL) concentrated oral syringe 2.6 mg  2.6 mg SubLINGual Q4H PRN Lab/Data Review: 
Labs: Results:  
   
Chemistry Recent Labs 20 
9428 * * K 3.7 * CO2 18*  
BUN 26* CREA 1.69* BUCR 15 AGAP 10  
CA 7.2* No results for input(s): TBIL, ALT, ALKP, TP, ALB, GLOB, AGRAT in the last 72 hours. No lab exists for component: SGOT CBC w/Diff Recent Labs 08/04/20 
7857 WBC 8.3  
RBC 2.03* HGB 6.1*  
HCT 19.5* MCV 96.1 MCH 30.0 MCHC 31.3  
RDW 16.9*  
* Coagulation No results for input(s): PTP, INR, APTT, INREXT, INREXT in the last 72 hours. Iron/Ferritin Lab Results Component Value Date/Time Ferritin 223 08/04/2020 02:28 AM  
   
BNP Cardiac Enzymes Lab Results Component Value Date/Time  07/21/2020 02:03 AM  
 CK - MB 4.6 (H) 07/19/2020 08:10 PM  
 CK-MB Index 1.2 07/19/2020 08:10 PM  
 Troponin-I, QT 0.20 (H) 07/21/2020 07:45 AM  
 
  
Lactic Acid Thyroid Studies All Micro Results None Images: 
 
CT (Most Recent). CT Results (most recent): 
Results from [facility] [place] encounter on 07/19/20 CT HEAD WO CONT Narrative CT of the head without contrast 
 
HISTORY:Code S. Altered level of consciousness. COMPARISON: None. TECHNIQUE: Helical axial scan to the head was performed from the skull base to 
the vertex without IV contrast administration. All CT scans at this facility performed using dose optimization techniques as 
appreciated to a performed exam, to include automated exposure control, 
adjustment of the mA and or KU according to patient size (including appropriate 
matching for site specific examination), or use of iterative reconstruction 
technique. FINDINGS: 
 
Motion artifact noted. Mild age-related global atrophy with mild ventricular 
prominence identified. There is normal gray-white matter differentiation. There 
is no evidence of acute intracranial hemorrhage, mass effect or midline shift 
identified. No hyperdense MCA sign. No skull fracture or extra axial fluid 
collections identified. Visualized sinuses and mastoid air cells appear 
unremarkable. Impression IMPRESSION: 
 
No acute intracranial hemorrhage or mass effect. Note: If clinical concern of acute stroke, CTA or MRI with diffusion weighted images is recommended for further evaluation. The wet read was provided to the floor nurse, Ms. Ward, for the ordering 
physician by me upon the initial interpretation at approximately 1431 hours, 
confirmation received. Thank you for your referral.  
 
 
CT abd/pelv: 
FINDINGS:  
Limitations: The evaluation of the solid organs is limited due to the lack of IV 
contrast. Evaluation of the abdomen and pelvis is limited due to patient motion. 
  
Lower Chest: Multifocal groundglass opacities noted in the lower lobes. There is 
an area of consolidation in the right middle lobe abutting the major fissure. Cardiomegaly and small pericardial effusion is present. 
  
Peritoneum: No free air appreciated. No free fluid present. No fluid collections 
present. 
  
Liver: No focal lesion appreciated. 
  
Biliary/Gallbladder: No intrahepatic or extrahepatic biliary ductal dilatation 
appreciated. The gallbladder is partially obscured due to motion. No 
pericholecystic fluid or inflammation. 
  
Spleen: Unremarkable. 
  
Pancreas: No focal lesion appreciated. The pancreatic duct is unremarkable. No 
peripancreatic inflammation or adenopathy. 
  
: The adrenal glands are unremarkable. No urolithiasis. No perinephric fat 
stranding appreciated. No hydroureteronephrosis seen. No acute pathology in the 
bladder. 
  
GI: The stomach is unremarkable. The small bowel is without evidence of 
obstruction. No small bowel wall thickening. The mesentery is without 
inflammation or adenopathy. The appendix is not definitively identified. No 
pericolonic inflammation or wall thickening appreciated. 
  
Aorta and retroperitoneum: No aortic aneurysm seen. No periaortic adenopathy 
seen. No fluid collections in the retroperitoneum appreciated. 
  
Abdominal wall/Inguinal: Unremarkable  
  
Musculoskeletal: Degenerative changes most pronounced at L5-S1 are noted. 
  
IMPRESSION IMPRESSION:  
 1.   Multifocal groundglass opacities in the lower lobes and consolidation in 
the right middle lobe. This could be seen in [place] as well as other pulmonary 
infections. Interstitial pneumonitis is also in the differential. 
  
No acute pathology appreciated in the abdomen or pelvis. However, evaluation is 
mildly limited due to patient motion. 
   
 
XRAY (Most Recent) EKG No results found for this or any previous visit. 2D ECHO 07/19/20 ECHO ADULT FOLLOW-UP OR LIMITED 07/24/2020 7/24/2020 Narrative · LV: Normal cavity size and systolic function (ejection fraction normal). Moderately increased wall thickness. Estimated left ventricular ejection  
fraction is 60 - 65%. Visually measured ejection fraction.  Wall motion:  
normal. 
   
  Signed by: Andrew Malave MD

## 2020-08-06 NOTE — PROGRESS NOTES
Palliative Medicine Consult DR. PELAYO [facility]: [phone] ([phone]) [facility]: [phone] 
  
Palliative Medicine follow up with Benton Boxer, and this writer. Juliann Zhao. Davis Price is lying in bed, awake, eyes open and appears rested. Spoke with [facility] concerning need to manage patients agitation with ativan to prevent escalation of restlessness.     
  
Comfort POST returned via [place], signed pt daughter. Copies placed on chart for scanning into EMR. Consulted with Hospice concerning hospice referral note appreciated. Call placed to pt's granddaughter, Randall Rodriguez to provide results of positive COVID 19 testing. Explained that due to positive test, no visitor. DNR/DNI Comfort measures. Remains on comfort measures.   
 
  
Mary HECKN, RN, [facility] Palliative Medicine Inpatient DR. PELAYO [facility]      
Palliative [facility] Line: [phone] ([phone])

## 2020-08-06 NOTE — CONSULTS
[facility] [phone] [facility]: [phone] [facility]: [phone] Patient Name: Bertram Dunham YOB: 1929 Date of Initial Consult: 2020, follow up 2020, 2020, 2020, 2020, 2020, 8/3/2020, 2020, 2020 Reason for Consult: Care decisions Requesting Provider: Марина Mishra MD 
Primary Care Physician: Derek Tran MD 
  
 SUMMARY:  
Bertram Dunham is a 80y.o. year old female with a past history of HTN, MV disorder, hyperlipidemia, and DM type 2 who was admitted on 2020 from home with a diagnosis of NSTEMI and COVID-19 Pneumonia. Current medical issues leading to Palliative Medicine involvement include: Care decisions. 2020: To preserve PPE, patient was seen from [facility]. Patient seems to be awake, moving upper extrimities. Seems to be in NAD. Continues on comfort measures. 2020 To preserve PPE, patient was seen on the video camera and spoke with the bedside RN. Patient seems to be awake, moving her upper extremities spontaneously,  
moving her head from side to side spontaneously. The bedside RN said the patient remains confused and moaning at times; remains on high flow O2 at 5 liters; temperature is 94°F. H&H 6.1 g/dL/19.5% on 8/3/2020. DNR/DNI, comfort measures, no tube feedings, no blood transfusion. 8/3/2020 To preserve PPE, update on patient's condition was obtained from the bedside RN. She said patient is not eating, confused, yelling at times, with soft wrist restraints in place on both hands. DNR/DNI with tube feedings 2020 calm at time we saw. Spoke with nurse agitated last night with yelling out. Spoke with family including daughter Maria E Almendarez and grand daughters. No decision on comfort measures. DNR/DNI  
 
2020 yelling out at times. Confused. Poor po intake 2020 agitated at times, calling for sister who is .  Long conversation with daughter Sapna Cornelius, granddaughter Margot Herron. Discussed comfort option. They are discussing. 7/27/2020 seen this am around 1100. High flow donned. Alert eyes open. Spoke with daughter Corinne Mcelroy and son Chemo Lockwood via phone. DNR/DNI  
 
7/24/2020 comfortable appearing. Calm, in NAD  
 PALLIATIVE DIAGNOSES:  
1.Goals of care 2. Non-ST elevation MI 
3. Multifocal pneumonia 4. COVID-19 infection PLAN:  
8/6/2020: To preserve PPE, patient was seen from [facility]. Patient seems to be awake, moving upper extrimities. Seems to be in NAD. Continues on comfort measures. Remains COVID +. Support to family as they cannot come to visit due to visitor restrictions in place in response to COVID-19. Goals of care unchanged. Continue DNR/DNI, comfort measures, NO feeding tubes. Signed POST now on file. Hospice and CM working with family to determine discharge planning, appreciate assistance. Will continue to follow for support and symptom management. See previous conversations below: 
 
8/4/2020 Follow up: To preserve PPE, patient was seen via the video camera and spoke with the bedside RN. Patient seems to be awake, moving her upper extremities spontaneously, moving her head from side to side spontaneously. The bedside RN said the patient remains confused and moaning at times; remains on high flow O2 at 5 liters; temperature is 94°F. H&H 6.1 g/dL/19.5% on 8/3/2020. Spoke with Margot Herron, granddaughter and Sapna Cornelius, daughter of patient via the telephone today. They were updated on patient's condition. Reassurance and emotional support given to family. Discussed and explained the goals, benefits and burdens of comfort care. POST form was discussed; that Ms. Corinne Mcelroy, daughter of patient needs to sign it. The form will be emailed to Margot Herron, granddaughter and she will forward it to her mother for signature via docu-sign, then she will e-mail it back to us.  I confirmed with the family that NO blood transfusion will be given. Dr. Marcial Blake was made aware that family said NO to the blood transfusion; that they want patient to be on comfort care. He was also made aware that family is requesting for a repeat COVID-19 testing. Comfort care orders and IP Hospice referral were placed. BSRN and the  were notified of comfort care order. GOC: DNR/DNI, COMFORT CARE, no blood transfusion, no tube feedings. 8/3/2020 Follow up: To preserve PPE, patient's current condition was discussed with bedside RN. She said patient remains confused, not eating, yelling at times, with soft wrist restraints in place on both hands. Patient's family Michaela Eubanks, granddaughter and Bobbi Trejo, daughter) called to let us know of their decision about tube feeding. They want to start  feeding her via the naso-gastric tube temporarily. They are hoping that patient's confusion will clear up if she is able to get nutrition. They said they had a video  call yesterday and heard patient yelling and they thought that patient is responding to them. The benefits and burdens of tube feeding were discussed with the family. Information on tube feeding will be emailed to the family by POOJA Brumfield RN. Dr. Davis James was made aware of the family's decision on tube feeding. DNR/DNI with tube feedings. 7/31/2020  Follow up on Ms Yong Huffman. She is lying in bed quiet this AM. Discussed with nurse agitated last night received ativan. Long conversation with daughter Bobbi Trejo grand daughters Brandyn Saldana and Ric Juli. Clinical update given, including unfortunately despite best treatment her mentation is not improving nor is her appetite. Discussed aggressive care moving forward, nutrition will need to be addressed including feeding tube placement. We shared the burdens of these efforts at some length with family. Comfort measures discussed at some length with family.  We asked family to consider what Norma Kimble would want and tell them to do if she were able and understanding her illness and how this affects her quality of life. Family did share they would not be able to care for patient at home as there is limited family available. No care decisions were made today. Goals of care DNR/DNI limited inventions, feeding tube to be decided. Further care decisions to be decided on by family. Family asked for time to re discuss with all family members. 2020 follow up this am. To preserve PPE viewed through monitor as she is COVID  19+ . Discussed with RN. Remains agitated at times. Yelling out. MAR reviewed Ativan 0.5 at 0100 this am, received x 2 yesterday. Despite best treatment she continues to be confused agitated not eating. Appreciate conversations by attending with daughter Ronal Barnett. She indicated to him she would like comfort measures here in hospital, but would be unable to care for her mother at home. Spoke with grand daughter [facility] ( at [facility] request I call her) Reviewed medical condition at some detail. During conversation attending spoke with patient's daughter Ronal Barnett who indicated she does not wish for her mother to suffer and wishes to embark on a more  comfort directed approach. Discussed with [facility] who asked we continue current level of care so she can speak with her mother to ensure she completley understood conversation. Message left with attending on above. Comfort measures discussed again in detail with [facility]. Goals of care DNR/DNI  
 
 
 
.2020 follow up along with Ms Kelly Puga RN. Agitated, calling for her  sister Chapis Smithnaomy, \" take my hand Sakshi\" Family was able to participate in video ( daughter Toya Goodell, grand daughter [facility], and other family members) They were visibly upset and could be patient's distress. After video participated in family conference via phone with the above attendees. Updated on overall condition.  Honest but compassionate discussion. Unfortunately despite best treatment and efforts she is declining, not eating, confused agitated. All likely due to the effects of COVD 19 and MI. All questions answered to the best of our ability. Discussed moving to comfort measures, symptom management, vs continuing current course. Hospice at home also discussed. Difficult discussions with family. We offered support in this difficult time. Shared with family unfortunately Ms Gutierrez's prognosis is poor and although difficult to predict her time maybe short. Family wishes to consider all options. Currently no change in care decisions, continue treatment. Goals of care DNR/DNI limited interventions. Continue current level of care. Attending updated on conversation. 7/27/2020 follow up on Ms Jermaine Baez. Noted on requiring high flow oxygen. Seen through window due to COVID 19 isolation. At time of our visit around 1100 she appeared calm, eyes open moving in bed. Discussed with BSRN no issues at that time. Poor po. Spoke with daughter Katerin Servin, and son Chinedu Juarez via phone. Updated on clinical condition as of 1100. Re addressed goals of care discussed with both Sara and Jimmy DNR/DNI. Ms Karel Obrien and Ms Teresa Santiago, ANIBAL were witnesses to conversation. Plan on video conference with family at 9:30 tomorrow. We also shared with daughter, with advanced age and COVID 23 clinical condition can be very fluid, meaning her condition can change rapidly. Family is very hopeful and prayerful she will recover. Goals of care DNR/DNI continue current medical treatment. Please note at time of our call to the family not aware of code stroke being called. Attending and BSRN aware of code change. 7/24/2020 follow up along with Ms Lopez President, RN. To preserve PPE patient evaluated via video monitor. She is calm. Spoke with RN calm for the most part. She remains confused. Spoke with another Hanna daughter Bertina Homans.  Ms Syed Kim also shared the call with her friend Sandy Marroquin who is palliative RN. We again addressed goals of care burdens of resuscitation, with advanced age  And the effect on her quality of life. All very understanding of information and they are continuing discussions which we highly encouraged. We have also shared with the family the possibility she may not return to her baseline level of functioning or cognition. Goals of care full code with full interventions. Goals of care: 
    Saw patient today, she is awake, alert, moving all extremities spontaneously and to purpose. She is trying to remove medical equipments and monitors which are attached to her. She appears to be confused. She is not in any distress. Called Antionette Lowery, son of patient when unable to contact daughter, Ronal Barnett. Antionette Lowery said to talk to his sister, Ronal Barnett about patient. We were able to talk to Ronal Barnett and patient's granddaughter today. They said that patient is very active and independent with her ADL prior to this admission. They denied noticing confusion or any other mental issues with patient. They said that patient is mentally alert and very capable of deciding for herself prior to admission. They also said patient is able to walk and goes to the grocery store. Granddaughter said patient and her family has not had any discussion about AMD and goals of care. Discussed the benefits and burdens of goals of care with the granddaughter and with Ms. Ronal Barnett. Social: has multiple recent deaths in the family ( son, nephew); one of her son is sick and in the hospital, per granddaughter. 2. Non-ST elevation MI: 
    Patient is on telemetry monitor and on IV Heparin 3. Multifocal pneumonia:  
    Patient is on O2 at 4 liters via NC with O2 saturation above 90%. She has no shortness of breath. 4. COVID-19 infection: 
    Rapid COVID-19 test on 7/21/2020 was detected. TREATMENT PREFERENCES:  
Code Status: DNR/DNI Advance Care Planning: [] [facility] Interdisciplinary Team has updated the ACP Navigator with Health Care Agent and Patient Capacity Primary Decision Maker (Legal next of kin): Has no MPOA. Pt does not have an Advance Directive on file in EMR and is not able to complete one currently. Legal Next of Kin would remain as the medical decision maker at this time since Ms. Terrance Khoury is confused. Pt is a  and has two living adult children. Health care decision making will fall to a consensus of Yee Barbour (daughter) and Rohit Gutierrez(son) Call placed to Rohit Wrightauvais, he requested we contact his sister Samanta Mino she is the caregiver and makes all the decisions. Too Metzger, daughter - home number: [phone], mobile number: [phone], e-mail: [email or web address] Medical Interventions: Comfort measures Artificially Administered Nutrition: No feeding tube Other: As far as possible, the palliative care team has discussed with patient / health care proxy about goals of care / treatment preferences for patient. HISTORY:  
 
History obtained from: Chart, Daughter, Granddaughter CHIEF COMPLAINT: NSTEMI, COVID - 19 infection HPI/SUBJECTIVE: The patient is:  
[] Verbal and participatory [x] Non-participatory due to: confusion Clinical Pain Assessment (nonverbal scale for nonverbal patients):  
Patient moans at times per bedside RN Activity (Movement): Seeking attention through movement or slow, cautious movement Duration: for how long has pt been experiencing pain (e.g., 2 days, 1 month, years) Frequency: how often pain is an issue (e.g., several times per day, once every few days, constant) FUNCTIONAL ASSESSMENT:  
 
Palliative Performance Scale (PPS):  20% PPS: 20 
 
ECOG 
ECOG Status : Completely disabled PSYCHOSOCIAL/SPIRITUAL SCREENING:  
  
Any spiritual / Samaritan concerns: unable to assess 
[] Yes /  [] No 
 
Caregiver Burnout: 
[] Yes /  [] No /  [x] No Caregiver Present Anticipatory grief assessment: unable to assess 
[] Normal  / [] Maladaptive REVIEW OF SYSTEMS:  
 
Positive and pertinent negative findings in ROS are noted above in HPI. The following systems were [x] reviewed / [] unable to be reviewed as noted in HPI Other findings are noted below. Systems: constitutional, ears/nose/mouth/throat, respiratory, gastrointestinal, genitourinary, musculoskeletal, integumentary, neurologic, psychiatric, endocrine. Positive findings noted below. Modified ESAS Completed by: provider Fatigue: 4 Drowsiness: 0 Depression: 0 Pain: 3 Anxiety: 2 Dyspnea: 3 Stool Occurrence(s): 1 PHYSICAL EXAM:  
 
Wt Readings from Last 3 Encounters:  
08/06/20 83.1 kg (183 lb 4.8 oz) 01/26/17 82.6 kg (182 lb)  
08/25/16 83 kg (183 lb) Blood pressure 146/67, pulse 99, temperature 98 °F (36.7 °C), resp. rate 18, height 5' 6\" (1.676 m), weight 83.1 kg (183 lb 4.8 oz), SpO2 100 %. Pain: 
Pain Scale 1: Visual 
Pain Intensity 1: 0 Pain Intervention(s) 1: Medication (see MAR) Last bowel movement: x 1 To preserve PPE as patient is COVID + PE is limited Constitutional: moving both upper extremities spontaneously, moving head from side to side Respiratory: breathing not labored, on high flow O2 at 5 liters HISTORY:  
 
Principal Problem: 
  Pneumonia due to 2019 novel coronavirus (7/26/2020) Active Problems: 
  NSTEMI (non-ST elevated myocardial infarction) ([facility] [address] (7/20/2020) PNA (pneumonia) (7/21/2020) Goals of care, counseling/discussion () COVID-19 () Past Medical History:  
Diagnosis Date  Essential hypertension, benign  Mitral valve disorders(424.0) Moderate MR  
 Other and unspecified hyperlipidemia  Other specified cardiac dysrhythmias(427.89) Non-sustained VT on Holter in past  
 Type II or unspecified type diabetes mellitus without mention of complication, not stated as uncontrolled History reviewed. No pertinent surgical history. Family History Problem Relation Age of Onset  Heart Disease Other Positive family history of ischemic heart disease. History reviewed, no pertinent family history. Social History Tobacco Use  Smoking status: Never Smoker  Smokeless tobacco: Never Used Substance Use Topics  Alcohol use: No  
 
Allergies Allergen Reactions  Minoxidil Other (comments) edema Current Facility-Administered Medications Medication Dose Route Frequency  ondansetron (ZOFRAN) injection 4 mg  4 mg IntraVENous Q4H PRN  
 LORazepam (INTENSOL) 2 mg/mL oral concentrate 0.5 mg  0.5 mg SubLINGual Q4H PRN  
 acetaminophen (TYLENOL) suppository 650 mg  650 mg Rectal Q4H PRN  
 bisacodyL (DULCOLAX) suppository 10 mg  10 mg Rectal DAILY PRN  
 morphine (ROXANOL) concentrated oral syringe 2.6 mg  2.6 mg SubLINGual Q4H PRN  
 
 
 LAB AND IMAGING FINDINGS:  
 
Lab Results Component Value Date/Time WBC 8.3 08/04/2020 02:28 AM  
 HGB 6.1 (L) 08/04/2020 02:28 AM  
 PLATELET 492 (L) 57/34/3683 02:28 AM  
 
Lab Results Component Value Date/Time Sodium 153 (H) 08/04/2020 02:28 AM  
 Potassium 3.7 08/04/2020 02:28 AM  
 Chloride 125 (H) 08/04/2020 02:28 AM  
 CO2 18 (L) 08/04/2020 02:28 AM  
 BUN 26 (H) 08/04/2020 02:28 AM  
 Creatinine 1.69 (H) 08/04/2020 02:28 AM  
 Calcium 7.2 (L) 08/04/2020 02:28 AM  
 Magnesium 2.1 07/23/2020 05:29 AM  
  
Lab Results Component Value Date/Time Alk. phosphatase 64 07/26/2020 04:31 AM  
 Protein, total 6.7 07/26/2020 04:31 AM  
 Albumin 2.6 (L) 07/26/2020 04:31 AM  
 Globulin 4.1 (H) 07/26/2020 04:31 AM  
 
Lab Results Component Value Date/Time INR 1.4 (H) 07/23/2020 05:29 AM  
 Prothrombin time 16.8 (H) 07/23/2020 05:29 AM  
 aPTT 27.5 07/28/2020 03:14 AM  
  
Lab Results Component Value Date/Time  Ferritin 223 08/04/2020 02:28 AM  
  
No results found for: PH, PCO2, PO2 
 No components found for: [place] Lab Results Component Value Date/Time  07/21/2020 02:03 AM  
 CK - MB 4.6 (H) 07/19/2020 08:10 PM  
  
 
   
 
Total time: 15 minutes Counseling / coordination time, spent as noted above: 10 minutes 
> 50% counseling / coordination: Yes, patient and RN Prolonged service was provided for  []30 min   []75 min in face to face time in the presence of the patient, spent as noted above. Time Start:  
Time End:  
Note: this can only be billed with 82645 (initial) or 97452 (follow up). If multiple start / stop times, list each separately.

## 2020-08-06 NOTE — ROUTINE PROCESS
1915 Bedside and Verbal shift change  Received from Rocio Muhammad RN (outgoing nurse), to TELLO Deutsch (oncoming)  Pt. Is AOX self. IV SL, Pt. No cues of visual  pain at this time. Report included the following information SBAR, Kardex, Procedure Summary, Intake/Output, MAR, Recent Lab Results. Will resume care and monitor Pt. Condition. 1955 Pt. Educated on call bell when in need of help and assistance. Pt. Carlos Soodt to verbalized understanding and comprehend education. Bed alarm on. 
 
Pt. Head to toe Assessment Done and documented. 2100  Pt. In bed not in distress. 2230  Pt. Resting comfortably in bed. 
 
2330 TRANSFER - OUT REPORT: 
 
Telephone report given to SATHISH Cliftonbs(name) on Guinda Bur  being transferred to (unit) for routine progression of care Report consisted of patients Situation, Background, Assessment and  
Recommendations(SBAR). Information from the following report(s) Kardex and MAR was reviewed with the receiving nurse. Lines:  
Peripheral IV 07/26/20 Posterior; Left Forearm (Active) Site Assessment Clean, dry, & intact 08/05/20 1955 Phlebitis Assessment 0 08/05/20 1955 Infiltration Assessment 0 08/05/20 1955 Dressing Status Clean, dry, & intact 08/05/20 1955 Dressing Type Transparent;Tape 08/05/20 1955 Hub Color/Line Status Pink;Capped 08/05/20 1955 Action Taken Open ports on tubing capped 08/05/20 1955 Alcohol Cap Used Yes 08/05/20 0306 Peripheral IV 07/29/20 Distal;Posterior;Right Forearm (Active) Site Assessment Clean, dry, & intact 08/05/20 1955 Phlebitis Assessment 0 08/05/20 1955 Infiltration Assessment 0 08/05/20 1955 Dressing Status Clean, dry, & intact 08/05/20 1955 Dressing Type Transparent;Tape 08/05/20 1955 Hub Color/Line Status Pink;Capped 08/05/20 1955 Action Taken Dressing changed 08/05/20 1955 Alcohol Cap Used Yes 08/05/20 1955 Opportunity for questions and clarification was provided.    
 
Patient transported with: 
 Registered Nurse Pt. Transported per BED.

## 2020-08-06 NOTE — CDMP QUERY
Patient admitted with COVID-19, pneumonia, NSTEMI. Noted documentation of Severe Malnutrition on dietitian note dated 8/3/20. After study, please clarify if you concur with this diagnosis. If possible, please document in progress notes and discharge summary: 
 
? Severe Malnutrition confirmed ? Severe Malnutrition ruled out (include corresponding diagnosis for patients clinical picture and treatment) ? Other, please specify ? Clinically unable to determine The medical record reflects the following: 
  Risk Factors: 80years of age Clinical Indicators: per dietitian: Malnutrition Assessment: 
Malnutrition Status: Severe malnutrition Treatment: patient is currently comfort measures only Please call me for any questions Thank you, Demian Yoon RN, CDIP

## 2020-08-06 NOTE — HOSPICE
Spoke with Kim Aguiar, pt's daughter and Antionette Callaway, pt's granddaughter via telephone [facility]ed [facility] philosophy, services, criteria, and IDT. Discussed caregiver need for round the clock care with Kim Aguiar and Antionette Callaway  
primary caregiver identified as does not have one Caregiver concerns identified as Kim Aguiar stated pt was not coming home from the hospital so it was no need to discuss services. Antionette Callaway advised that the family does not have the capability to do home hospices or rhe financial resources to pay for a facility at this time. I advised once a pt is made comfortable there has to be a discharge plan in place and she can't stay in the hospital indefinitely. Antionette Callaway voiced understanding and advised she would continue to communicate with the palliative team.  
  
Boby Collins all questions. Provided with 24/7 contact information. Thank you for the referral to [facility]. If we can be of further assistance please contact [phone]. Millie Rivera RN [place] [address] [place], [facility] [address] 
[phone] Email: [email or web address]

## 2020-08-06 NOTE — ROUTINE PROCESS
Bedside and Verbal shift change report given to Eugenia Almonte RN (oncoming nurse) by Jose Alfredo Rogers RN 
 (offgoing nurse). Report included the following information SBAR and Kardex.

## 2020-08-07 NOTE — PROGRESS NOTES
[facility] [phone] ([phone]) [facility]: [phone] 
  
Palliative Medicine follow up with Rocio Pineda LCSW, and this writer. Doug Otto. Joselito Calvin is lying in bed. Being assisted with AM personal care. Pt is awake with eyes open with episodes of moaning. Spoke with BSRN on measures to manage patient's agitation with ativan to prevent escalation of restlessness.     
  
Comfort POST on file signed by patient's daughter, Onesimo Mcbride 
  
DNR/DNI Comfort measures. Remains on comfort measures.   
  
  
Mila IRWIN, RN, [facility] Palliative Medicine Inpatient DR. CONTES [facility]      
Palliative COPE Line: [phone] ([phone]) Returned call to pt's granddaughter, Lennox Binning. Provided brief medical update. Family expressed gratitude for the patience of the nursing staff and the Zoom video call.   
 
Mila Mccartney RN

## 2020-08-07 NOTE — PROGRESS NOTES
Hospitalist Progress Note Patient: Phyllis Gosselin Age: 80 y.o. : 2/15/1929 MR#: [id number] SSN: [id number] Date/Time: 2020 1:06 PM 
 
DOA: 2020 PCP: David Galindo MD 
 
Subjective: She remains comfortable without respiratory distress. No overnight event. COVID-19 test is still positive Only with minimal oral intake Interval Hospital Course: 
80 y.o female with HTN, hyperlipidemia, MV disorder, DM2 who initially brought to ER because she was more somnolent and sleeping more. In the ER, she was noted to have hyperglycemia with vague abdominal complaint. Further lab workup was concern for NSTEMI and she was started on heparin gtt. She was also tested for COVID-19 infection which was positive. She was admitted for acute hypoxia with COVID-19 pneumonia, NSTEMI. Cardiology was consulted for further care. ID was also consulted for further care. She continued on azithromycin, solumedrol. During her stay, she developed worsening hypoxia and agitation. Code S was called. Her antibiotic was switched to Zosyn due to concern for aspiration. CT head was negative for stroke or bleed. She continued to be somnolent, unable to maintain oral intake. She remained at poor prognosis. Palliative care team was consulted for further care. ROS: unable due to altered mentation Assessment/Plan:  
 
1. COVID-19 pneumonia, repeat COVID-9 test positive 2020 2. Acute respiratory failure with hypoxia due to COVID-19 infection 3. Acute metabolic encephalopathy associate with COVID-19 infection and metabolic change 4. ZION on CKD3, improved 5. Hypernatremia due to lack of free water intake 6. NSTEMI 7. Mild acute diastolic CHF 8. Bradycardia 9. Hyperglycemia with T2DM 10. Acute anemia, possible GI blood loss due to Lovenox use   
    +stool occult blood positive 11. Advanced age with underlying dementia 12. Hypokalemia 13. Severe protein-calorie malnutrition ON COMFORT MEASURE ONLY Continue to have comfort feed. Comfort order set placed Spoke with nursing for further care plan Palliative care team appreciated Family is unable to transition patient home at this time. DNR/DNI Disposition: will consult ICM for discharge planning to facility. Might need 2negative COVID test   
 
Called and spoke with her family [phone] for clinical update Additional Notes:   
Time spent >30 minutes Case discussed with:  [x]Patient  [x]Family  [x]Nursing  [x]Case Management DVT Prophylaxis:  []Lovenox  []Hep SQ  []SCDs  []Coumadin   []On Heparin gtt Signed By: Malik Gore MD   
 2020 1:06 PM  
  
 
 
 
 
Objective:  
VS:  
Visit Vitals BP (!) 132/91 (BP 1 Location: Right arm, BP Patient Position: At rest) Pulse 96 Temp 97.2 °F (36.2 °C) Resp 18 Ht 5' 6\" (1.676 m) Wt 84.4 kg (186 lb 2.2 oz) SpO2 100% BMI 30.04 kg/m² Tmax/24hrs: Temp (24hrs), Av.8 °F (36.6 °C), Min:97.2 °F (36.2 °C), Max:98 °F (36.7 °C) Intake/Output Summary (Last 24 hours) at 2020 1306 Last data filed at 2020 1038 Gross per 24 hour Intake 180 ml Output 0 ml Net 180 ml Tele:  
General:  Cooperative, Not in acute distress HEENT: PERRL, EOMI, supple neck, no JVD, dry oral mucosa Cardiovascular: S1S2 regular, no rub/gallop Pulmonary: air entry bilaterally, no wheezing, ++crackle GI:  Soft, non tender, non distended, +bs, no guarding Extremities:  No pedal edema, +distal pulses appreciated Neuro: unable to participate Additional:  
 
 
Current Facility-Administered Medications Medication Dose Route Frequency  LORazepam (INTENSOL) 2 mg/mL oral concentrate 0.5 mg  0.5 mg SubLINGual TID  ondansetron (ZOFRAN) injection 4 mg  4 mg IntraVENous Q4H PRN  
 LORazepam (INTENSOL) 2 mg/mL oral concentrate 0.5 mg  0.5 mg SubLINGual Q4H PRN  
 acetaminophen (TYLENOL) suppository 650 mg  650 mg Rectal Q4H PRN  
  bisacodyL (DULCOLAX) suppository 10 mg  10 mg Rectal DAILY PRN  
 morphine (ROXANOL) concentrated oral syringe 2.6 mg  2.6 mg SubLINGual Q4H PRN Lab/Data Review: 
Labs: Results:  
   
Chemistry No results for input(s): GLU, NA, K, CL, CO2, BUN, CREA, BUCR, AGAP, CA, PHOS in the last 72 hours. No results for input(s): TBIL, ALT, ALKP, TP, ALB, GLOB, AGRAT in the last 72 hours. No lab exists for component: SGOT  
CBC w/Diff No results for input(s): WBC, RBC, HGB, HCT, MCV, MCH, MCHC, RDW, PLT, BANDS, GRANS, LYMPH, EOS, HGBEXT, HCTEXT, PLTEXT, HGBEXT, HCTEXT, PLTEXT in the last 72 hours. Coagulation No results for input(s): PTP, INR, APTT, INREXT, INREXT in the last 72 hours. Iron/Ferritin Lab Results Component Value Date/Time Ferritin 223 08/04/2020 02:28 AM  
   
BNP Cardiac Enzymes Lab Results Component Value Date/Time  07/21/2020 02:03 AM  
 CK - MB 4.6 (H) 07/19/2020 08:10 PM  
 CK-MB Index 1.2 07/19/2020 08:10 PM  
 Troponin-I, QT 0.20 (H) 07/21/2020 07:45 AM  
 
  
Lactic Acid Thyroid Studies All Micro Results None Images: 
 
CT (Most Recent). CT Results (most recent): 
Results from [facility] [place]counter encounter on 07/19/20 CT HEAD WO CONT Narrative CT of the head without contrast 
 
HISTORY:Code S. Altered level of consciousness. COMPARISON: None. TECHNIQUE: Helical axial scan to the head was performed from the skull base to 
the vertex without IV contrast administration. All CT scans at this facility performed using dose optimization techniques as 
appreciated to a performed exam, to include automated exposure control, 
adjustment of the mA and or KU according to patient size (including appropriate 
matching for site specific examination), or use of iterative reconstruction 
technique. FINDINGS: 
 
Motion artifact noted. Mild age-related global atrophy with mild ventricular prominence identified. There is normal gray-white matter differentiation. There 
is no evidence of acute intracranial hemorrhage, mass effect or midline shift 
identified. No hyperdense MCA sign. No skull fracture or extra axial fluid 
collections identified. Visualized sinuses and mastoid air cells appear 
unremarkable. Impression IMPRESSION: 
 
No acute intracranial hemorrhage or mass effect. Note: If clinical concern of acute stroke, CTA or MRI with diffusion weighted 
images is recommended for further evaluation. The wet read was provided to the floor nurse, Ms. Cherie Boucher, for the ordering 
physician by me upon the initial interpretation at approximately 1431 hours, 
confirmation received. Thank you for your referral.  
 
 
CT abd/pelv: 
FINDINGS:  
Limitations: The evaluation of the solid organs is limited due to the lack of IV 
contrast. Evaluation of the abdomen and pelvis is limited due to patient motion. 
  
Lower Chest: Multifocal groundglass opacities noted in the lower lobes. There is 
an area of consolidation in the right middle lobe abutting the major fissure. Cardiomegaly and small pericardial effusion is present. 
  
Peritoneum: No free air appreciated. No free fluid present. No fluid collections 
present. 
  
Liver: No focal lesion appreciated. 
  
Biliary/Gallbladder: No intrahepatic or extrahepatic biliary ductal dilatation 
appreciated. The gallbladder is partially obscured due to motion. No 
pericholecystic fluid or inflammation. 
  
Spleen: Unremarkable. 
  
Pancreas: No focal lesion appreciated. The pancreatic duct is unremarkable. No 
peripancreatic inflammation or adenopathy. 
  
: The adrenal glands are unremarkable. No urolithiasis. No perinephric fat 
stranding appreciated. No hydroureteronephrosis seen. No acute pathology in the 
bladder. 
  
GI: The stomach is unremarkable.  The small bowel is without evidence of 
 obstruction. No small bowel wall thickening. The mesentery is without 
inflammation or adenopathy. The appendix is not definitively identified. No 
pericolonic inflammation or wall thickening appreciated. 
  
Aorta and retroperitoneum: No aortic aneurysm seen. No periaortic adenopathy 
seen. No fluid collections in the retroperitoneum appreciated. 
  
Abdominal wall/Inguinal: Unremarkable  
  
Musculoskeletal: Degenerative changes most pronounced at L5-S1 are noted. 
  
IMPRESSION IMPRESSION:  
1.   Multifocal groundglass opacities in the lower lobes and consolidation in 
the right middle lobe. This could be seen in [place] as well as other pulmonary 
infections. Interstitial pneumonitis is also in the differential. 
  
No acute pathology appreciated in the abdomen or pelvis. However, evaluation is 
mildly limited due to patient motion. 
   
 
XRAY (Most Recent) EKG No results found for this or any previous visit. 2D ECHO 07/19/20 ECHO ADULT FOLLOW-UP OR LIMITED 07/24/2020 7/24/2020 Narrative · LV: Normal cavity size and systolic function (ejection fraction normal). Moderately increased wall thickness. Estimated left ventricular ejection  
fraction is 60 - 65%. Visually measured ejection fraction.  Wall motion:  
normal. 
   
  Signed by: Chintan Grigsby MD

## 2020-08-07 NOTE — CDMP QUERY
Patient admitted with COVID-19. Noted 'Acute anemia, possible blood loss in the setting of Lovenox use' documented by attending. Could this statement be further specified? If possible, please document in the progress notes and discharge summary if you are evaluating and/or treating any of the following: ? Bleeding due to Lovenox (please specify bleeding site) ? Bleeding is coexistent and unrelated to anticoagulation (please specify bleeding site) ? Other, please specify ? Clinically unable to determine The medical record reflects the following: 
   Risk Factors: COVID-19, NSTEMI Clinical Indicators: hgb down to 6.1 on 8/4 Treatment: currently on comfort measures Please call me for any questions Thank you, Jose Longoria RN, CDIP

## 2020-08-07 NOTE — PROGRESS NOTES
Nutrition Assessment Type and Reason for Visit: Jesica Camacho Nutrition Recommendations/Plan: Continue diet for comfort. Discontinue oral nutrition supplements. Nutrition Assessment:  NGT placement unsuccessful and pt transitioned ot comfort meansures only. Diet continues for comfort feeds only. Malnutrition Assessment: 
Malnutrition Status: Severe malnutrition Estimated Daily Nutrient Needs: 
Energy (kcal):  0667-0442 Protein (g):  62-77 Fluid (ml/day):  5720-9281 Nutrition Related Findings:  BM 8/5 Current Nutrition Therapies: DIET NUTRITIONAL SUPPLEMENTS Breakfast, Lunch, Dinner; [employer] DIET REGULAR Anthropometric Measures: 
· Height:  5' 6\" (167.6 cm) · Current Body Wt:  84.4 kg (186 lb 1.1 oz) · BMI: 30 
 
Nutrition Diagnosis:  
· Inadequate oral intake related to cognitive or neurological impairment, swallowing difficulty as evidenced by intake 0-25% Nutrition Intervention: 
Food and/or Nutrient Delivery: Continue current diet, Discontinue oral nutrition supplement Nutrition Education and Counseling: No recommendations at this time Coordination of Nutrition Care: No recommendation at this time Goals: 
Provide nutrition intervention as appropriate with goals of care for the next 7 days. Nutrition Monitoring and Evaluation:  
Food/Nutrient Intake Outcomes: Food and nutrient intake Physical Signs/Symptoms Outcomes: Nutrition focused physical findings, Meal time behavior Discharge Planning:   
No discharge needs at this time Electronically signed by Sabiha Cruz RD on 8/7/2020 at 2:39 PM 
 
Contact Number: [phone]

## 2020-08-07 NOTE — PROGRESS NOTES
helped the patient Misty Anaya, who is a 80 y.o.,female, connect with the family with Zoom Video Connection. Assessment: 
There are no further spiritual or Sabianist issues which require Spiritual Care Services interventions at this time. Plan: 
Chaplains will continue to follow and will provide pastoral care on an as needed/requested basis. [address] [facility]

## 2020-08-08 NOTE — ROUTINE PROCESS
Report received from [facility]. Patient is awake, confused, restless. Removed gown and O2. Gown and NC placed back on. Continue to monitor. 0600  Patient with eyes open, confused, fights and kicks with bath given. Incontinent of urine and stool. Skin care given. O2 via NC placed back on patient. Continue to monitor.

## 2020-08-08 NOTE — PROGRESS NOTES
Hospitalist Progress Note Patient: Selam Zhong Age: 80 y.o. : 2/15/1929 MR#: [id number] SSN: [id number] Date/Time: 2020 9:38 AM 
 
DOA: 2020 PCP: Ursula Walker MD 
 
Subjective:  
 
Nursing report agitation while trying to wash her up. No eating much today She remains without respiratory distress. No overnight event. COVID-19 test is still positive Only with minimal oral intake Interval Hospital Course: 
80 y.o female with HTN, hyperlipidemia, MV disorder, DM2 who initially brought to ER because she was more somnolent and sleeping more. In the ER, she was noted to have hyperglycemia with vague abdominal complaint. Further lab workup was concern for NSTEMI and she was started on heparin gtt. She was also tested for COVID-19 infection which was positive. She was admitted for acute hypoxia with COVID-19 pneumonia, NSTEMI. Cardiology was consulted for further care. ID was also consulted for further care. She continued on azithromycin, solumedrol. During her stay, she developed worsening hypoxia and agitation. Code S was called. Her antibiotic was switched to Zosyn due to concern for aspiration. CT head was negative for stroke or bleed. She continued to be somnolent, unable to maintain oral intake. She remained at poor prognosis. Palliative care team was consulted for further care. ROS: unable due to altered mentation Assessment/Plan:  
 
1. COVID-19 pneumonia, repeat COVID-9 test positive 2020 2. Acute respiratory failure with hypoxia due to COVID-19 infection 3. Acute metabolic encephalopathy associate with COVID-19 infection and metabolic change 4. ZION on CKD3, improved 5. Hypernatremia due to lack of free water intake 6. NSTEMI 7. Mild acute diastolic CHF 8. Bradycardia 9. Hyperglycemia with T2DM 10. Acute anemia, possible GI blood loss due to Lovenox use   
    +stool occult blood positive 11. Advanced age with underlying dementia 12.  Hypokalemia 13. Severe protein-calorie malnutrition ON COMFORT MEASURE ONLY Continue to have comfort feed. Comfort order set placed Spoke with nursing for further care plan Palliative care team appreciated Family is unable to transition patient home at this time. DNR/DNI Disposition: will consult ICM for discharge planning to facility. Might need 2negative COVID test   
 
Additional Notes:   
Time spent >30 minutes Case discussed with:  [x]Patient  [x]Family  [x]Nursing  [x]Case Management DVT Prophylaxis:  []Lovenox  []Hep SQ  []SCDs  []Coumadin   []On Heparin gtt Signed By: Mynor Shabazz MD   
 2020 9:38 AM  
  
 
 
 
 
Objective:  
VS:  
Visit Vitals /50 (BP 1 Location: Right arm, BP Patient Position: At rest) Pulse 78 Temp 97.6 °F (36.4 °C) Resp 18 Ht 5' 6\" (1.676 m) Wt 81.6 kg (179 lb 12.8 oz) SpO2 95% BMI 29.02 kg/m² Tmax/24hrs: Temp (24hrs), Av.8 °F (36.6 °C), Min:97.2 °F (36.2 °C), Max:98.7 °F (37.1 °C) Intake/Output Summary (Last 24 hours) at 2020 4925 Last data filed at 2020 1038 Gross per 24 hour Intake 180 ml Output  Net 180 ml Tele:  
General:  Cooperative, Not in acute distress HEENT: PERRL, EOMI, supple neck, no JVD, dry oral mucosa Cardiovascular: S1S2 regular, no rub/gallop Pulmonary: air entry bilaterally, no wheezing, ++crackle GI:  Soft, non tender, non distended, +bs, no guarding Extremities:  No pedal edema, +distal pulses appreciated Neuro: unable to participate Additional:  
 
 
Current Facility-Administered Medications Medication Dose Route Frequency  LORazepam (INTENSOL) 2 mg/mL oral concentrate 0.5 mg  0.5 mg SubLINGual TID  ondansetron (ZOFRAN) injection 4 mg  4 mg IntraVENous Q4H PRN  
 LORazepam (INTENSOL) 2 mg/mL oral concentrate 0.5 mg  0.5 mg SubLINGual Q4H PRN  
 acetaminophen (TYLENOL) suppository 650 mg  650 mg Rectal Q4H PRN  
  bisacodyL (DULCOLAX) suppository 10 mg  10 mg Rectal DAILY PRN  
 morphine (ROXANOL) concentrated oral syringe 2.6 mg  2.6 mg SubLINGual Q4H PRN Lab/Data Review: 
Labs: Results:  
   
Chemistry No results for input(s): GLU, NA, K, CL, CO2, BUN, CREA, BUCR, AGAP, CA, PHOS in the last 72 hours. No results for input(s): TBIL, ALT, ALKP, TP, ALB, GLOB, AGRAT in the last 72 hours. No lab exists for component: SGOT  
CBC w/Diff No results for input(s): WBC, RBC, HGB, HCT, MCV, MCH, MCHC, RDW, PLT, BANDS, GRANS, LYMPH, EOS, HGBEXT, HCTEXT, PLTEXT, HGBEXT, HCTEXT, PLTEXT in the last 72 hours. Coagulation No results for input(s): PTP, INR, APTT, INREXT, INREXT in the last 72 hours. Iron/Ferritin Lab Results Component Value Date/Time Ferritin 223 08/04/2020 02:28 AM  
   
BNP Cardiac Enzymes Lab Results Component Value Date/Time  07/21/2020 02:03 AM  
 CK - MB 4.6 (H) 07/19/2020 08:10 PM  
 CK-MB Index 1.2 07/19/2020 08:10 PM  
 Troponin-I, QT 0.20 (H) 07/21/2020 07:45 AM  
 
  
Lactic Acid Thyroid Studies All Micro Results None Images: 
 
CT (Most Recent). CT Results (most recent): 
Results from [facility] [place] Encounter encounter on 07/19/20 CT HEAD WO CONT Narrative CT of the head without contrast 
 
HISTORY:Code S. Altered level of consciousness. COMPARISON: None. TECHNIQUE: Helical axial scan to the head was performed from the skull base to 
the vertex without IV contrast administration. All CT scans at this facility performed using dose optimization techniques as 
appreciated to a performed exam, to include automated exposure control, 
adjustment of the mA and or KU according to patient size (including appropriate 
matching for site specific examination), or use of iterative reconstruction 
technique. FINDINGS: 
 
Motion artifact noted. Mild age-related global atrophy with mild ventricular prominence identified. There is normal gray-white matter differentiation. There 
is no evidence of acute intracranial hemorrhage, mass effect or midline shift 
identified. No hyperdense MCA sign. No skull fracture or extra axial fluid 
collections identified. Visualized sinuses and mastoid air cells appear 
unremarkable. Impression IMPRESSION: 
 
No acute intracranial hemorrhage or mass effect. Note: If clinical concern of acute stroke, CTA or MRI with diffusion weighted 
images is recommended for further evaluation. The wet read was provided to the floor nurse, Ms. Carolina Carr, for the ordering 
physician by me upon the initial interpretation at approximately 1431 hours, 
confirmation received. Thank you for your referral.  
 
 
CT abd/pelv: 
FINDINGS:  
Limitations: The evaluation of the solid organs is limited due to the lack of IV 
contrast. Evaluation of the abdomen and pelvis is limited due to patient motion. 
  
Lower Chest: Multifocal groundglass opacities noted in the lower lobes. There is 
an area of consolidation in the right middle lobe abutting the major fissure. Cardiomegaly and small pericardial effusion is present. 
  
Peritoneum: No free air appreciated. No free fluid present. No fluid collections 
present. 
  
Liver: No focal lesion appreciated. 
  
Biliary/Gallbladder: No intrahepatic or extrahepatic biliary ductal dilatation 
appreciated. The gallbladder is partially obscured due to motion. No 
pericholecystic fluid or inflammation. 
  
Spleen: Unremarkable. 
  
Pancreas: No focal lesion appreciated. The pancreatic duct is unremarkable. No 
peripancreatic inflammation or adenopathy. 
  
: The adrenal glands are unremarkable. No urolithiasis. No perinephric fat 
stranding appreciated. No hydroureteronephrosis seen. No acute pathology in the 
bladder. 
  
GI: The stomach is unremarkable.  The small bowel is without evidence of 
 obstruction. No small bowel wall thickening. The mesentery is without 
inflammation or adenopathy. The appendix is not definitively identified. No 
pericolonic inflammation or wall thickening appreciated. 
  
Aorta and retroperitoneum: No aortic aneurysm seen. No periaortic adenopathy 
seen. No fluid collections in the retroperitoneum appreciated. 
  
Abdominal wall/Inguinal: Unremarkable  
  
Musculoskeletal: Degenerative changes most pronounced at L5-S1 are noted. 
  
IMPRESSION IMPRESSION:  
1.   Multifocal groundglass opacities in the lower lobes and consolidation in 
the right middle lobe. This could be seen in [place] as well as other pulmonary 
infections. Interstitial pneumonitis is also in the differential. 
  
No acute pathology appreciated in the abdomen or pelvis. However, evaluation is 
mildly limited due to patient motion. 
   
 
XRAY (Most Recent) EKG No results found for this or any previous visit. 2D ECHO 07/19/20 ECHO ADULT FOLLOW-UP OR LIMITED 07/24/2020 7/24/2020 Narrative · LV: Normal cavity size and systolic function (ejection fraction normal). Moderately increased wall thickness. Estimated left ventricular ejection  
fraction is 60 - 65%. Visually measured ejection fraction.  Wall motion:  
normal. 
   
  Signed by: El Wray MD

## 2020-08-08 NOTE — PROGRESS NOTES
conducted a Follow up consultation and Spiritual Assessment for Lisandro Andrew, who is a 80 y.o.,female. The  provided the following Interventions: 
Continued the relationship of care and support. Listened empathically. Offered assurance of continued prayer on patients behalf. Chart reviewed. The following outcomes were achieved: 
Patient's family  expressed gratitude for 's visit. Assessment: 
There are no further spiritual or Baptism issues which require Spiritual Care Services interventions at this time. Plan: 
Chaplains will continue to follow and will provide pastoral care on an as needed/requested basis.  recommends bedside caregivers page  on duty if patient shows signs of acute spiritual or emotional distress. [address] [place] [address]  
[phone]

## 2020-08-09 NOTE — ROUTINE PROCESS
Bedside shift change report received from [facility]Ailda [facility]. Report included SBAR, Kardex, MAR, Recent Results, and Plan of Care.

## 2020-08-09 NOTE — PROGRESS NOTES
conducted a Follow up consultation and Spiritual Assessment for Selam Zhong, who is a 80 y.o.,female. The  provided the following Interventions: 
Continued the relationship of care and support with patient's granddaughter. Listened empathically. Offered assurance of continued prayer on patients behalf. Chart reviewed. The following outcomes were achieved: 
Patient's granddaughter expressed gratitude for 's visit. Assessment: 
There are no further spiritual or Rastafari issues which require Spiritual Care Services interventions at this time. Plan: 
Chaplains will continue to follow and will provide pastoral care on an as needed/requested basis.  recommends bedside caregivers page  on duty if patient shows signs of acute spiritual or emotional distress. [address] [place] [address] Care  
[phone]

## 2020-08-09 NOTE — PROGRESS NOTES
Hospitalist Progress Note Patient: Yahir Yost Age: 80 y.o. : 2/15/1929 MR#: [id number] SSN: [id number] Date/Time: 2020 9:02 AM 
 
DOA: 2020 PCP: Eliel Lipscomb MD 
 
Subjective: She remains on [address] Nursing report agitation while trying to wash her up. No eating much today No overnight event. COVID-19 test is still positive Only with minimal oral intake Interval Hospital Course: 
80 y.o female with HTN, hyperlipidemia, MV disorder, DM2 who initially brought to ER because she was more somnolent and sleeping more. In the ER, she was noted to have hyperglycemia with vague abdominal complaint. Further lab workup was concern for NSTEMI and she was started on heparin gtt. She was also tested for COVID-19 infection which was positive. She was admitted for acute hypoxia with COVID-19 pneumonia, NSTEMI. Cardiology was consulted for further care. ID was also consulted for further care. She continued on azithromycin, solumedrol. During her stay, she developed worsening hypoxia and agitation. Code S was called. Her antibiotic was switched to Zosyn due to concern for aspiration. CT head was negative for stroke or bleed. She continued to be somnolent, unable to maintain oral intake. She remained at poor prognosis. Palliative care team was consulted for further care. ROS: unable due to altered mentation Assessment/Plan:  
 
1. COVID-19 pneumonia, repeat COVID-9 test positive 2020 2. Acute respiratory failure with hypoxia due to COVID-19 infection 3. Acute metabolic encephalopathy associate with COVID-19 infection and metabolic change 4. ZION on CKD3, improved 5. Hypernatremia due to lack of free water intake 6. NSTEMI 7. Mild acute diastolic CHF 8. Bradycardia 9. Hyperglycemia with T2DM 10. Acute anemia, possible GI blood loss due to Lovenox use   
    +stool occult blood positive 11. Advanced age with underlying dementia 12. Hypokalemia 13.  Severe protein-calorie malnutrition ON COMFORT MEASURE ONLY Continue to have comfort feed. Comfort order set placed Spoke with nursing for further care plan Palliative care team appreciated Family is unable to transition patient home at this time. DNR/DNI Disposition: will consult ICM for discharge planning to facility. Might need 2negative COVID test   
 
Additional Notes:   
Time spent >30 minutes Case discussed with:  [x]Patient  []Family  [x]Nursing  [x]Case Management DVT Prophylaxis:  []Lovenox  []Hep SQ  []SCDs  []Coumadin   []On Heparin gtt Signed By: Ab Sanches MD   
 2020 9:02 AM  
  
 
 
 
 
Objective:  
VS:  
Visit Vitals /65 (BP 1 Location: Right arm, BP Patient Position: At rest) Pulse 67 Temp 97 °F (36.1 °C) Resp 18 Ht 5' 6\" (1.676 m) Wt 81.5 kg (179 lb 9.1 oz) SpO2 100% BMI 28.98 kg/m² Tmax/24hrs: Temp (24hrs), Av.1 °F (36.2 °C), Min:97 °F (36.1 °C), Max:97.2 °F (36.2 °C) No intake or output data in the 24 hours ending 20 0902 Tele:  
General:  Cooperative, Not in acute distress HEENT: PERRL, EOMI, supple neck, no JVD, dry oral mucosa Cardiovascular: S1S2 regular, no rub/gallop Pulmonary: air entry bilaterally, no wheezing, ++crackle GI:  Soft, non tender, non distended, +bs, no guarding Extremities:  No pedal edema, +distal pulses appreciated Neuro: unable to participate Additional:  
 
 
Current Facility-Administered Medications Medication Dose Route Frequency  LORazepam (INTENSOL) 2 mg/mL oral concentrate 0.5 mg  0.5 mg SubLINGual TID  ondansetron (ZOFRAN) injection 4 mg  4 mg IntraVENous Q4H PRN  
 LORazepam (INTENSOL) 2 mg/mL oral concentrate 0.5 mg  0.5 mg SubLINGual Q4H PRN  
 acetaminophen (TYLENOL) suppository 650 mg  650 mg Rectal Q4H PRN  
 bisacodyL (DULCOLAX) suppository 10 mg  10 mg Rectal DAILY PRN  
 morphine (ROXANOL) concentrated oral syringe 2.6 mg  2.6 mg SubLINGual Q4H PRN  
 Lab/Data Review: 
Labs: Results:  
   
Chemistry No results for input(s): GLU, NA, K, CL, CO2, BUN, CREA, BUCR, AGAP, CA, PHOS in the last 72 hours. No results for input(s): TBIL, ALT, ALKP, TP, ALB, GLOB, AGRAT in the last 72 hours. No lab exists for component: SGOT  
CBC w/Diff No results for input(s): WBC, RBC, HGB, HCT, MCV, MCH, MCHC, RDW, PLT, BANDS, GRANS, LYMPH, EOS, HGBEXT, HCTEXT, PLTEXT, HGBEXT, HCTEXT, PLTEXT in the last 72 hours. Coagulation No results for input(s): PTP, INR, APTT, INREXT, INREXT in the last 72 hours. Iron/Ferritin Lab Results Component Value Date/Time Ferritin 223 08/04/2020 02:28 AM  
   
BNP Cardiac Enzymes Lab Results Component Value Date/Time  07/21/2020 02:03 AM  
 CK - MB 4.6 (H) 07/19/2020 08:10 PM  
 CK-MB Index 1.2 07/19/2020 08:10 PM  
 Troponin-I, QT 0.20 (H) 07/21/2020 07:45 AM  
 
  
Lactic Acid Thyroid Studies All Micro Results None Images: 
 
CT (Most Recent). CT Results (most recent): 
Results from [facility] Encounter encounter on 07/19/20 CT HEAD WO CONT Narrative CT of the head without contrast 
 
HISTORY:Code S. Altered level of consciousness. COMPARISON: None. TECHNIQUE: Helical axial scan to the head was performed from the skull base to 
the vertex without IV contrast administration. All CT scans at this facility performed using dose optimization techniques as 
appreciated to a performed exam, to include automated exposure control, 
adjustment of the mA and or KU according to patient size (including appropriate 
matching for site specific examination), or use of iterative reconstruction 
technique. FINDINGS: 
 
Motion artifact noted. Mild age-related global atrophy with mild ventricular 
prominence identified. There is normal gray-white matter differentiation. There 
is no evidence of acute intracranial hemorrhage, mass effect or midline shift identified. No hyperdense MCA sign. No skull fracture or extra axial fluid 
collections identified. Visualized sinuses and mastoid air cells appear 
unremarkable. Impression IMPRESSION: 
 
No acute intracranial hemorrhage or mass effect. Note: If clinical concern of acute stroke, CTA or MRI with diffusion weighted 
images is recommended for further evaluation. The wet read was provided to the floor nurse, Ms. Caitlin Ardon, for the ordering 
physician by me upon the initial interpretation at approximately 1431 hours, 
confirmation received. Thank you for your referral.  
 
 
CT abd/pelv: 
FINDINGS:  
Limitations: The evaluation of the solid organs is limited due to the lack of IV 
contrast. Evaluation of the abdomen and pelvis is limited due to patient motion. 
  
Lower Chest: Multifocal groundglass opacities noted in the lower lobes. There is 
an area of consolidation in the right middle lobe abutting the major fissure. Cardiomegaly and small pericardial effusion is present. 
  
Peritoneum: No free air appreciated. No free fluid present. No fluid collections 
present. 
  
Liver: No focal lesion appreciated. 
  
Biliary/Gallbladder: No intrahepatic or extrahepatic biliary ductal dilatation 
appreciated. The gallbladder is partially obscured due to motion. No 
pericholecystic fluid or inflammation. 
  
Spleen: Unremarkable. 
  
Pancreas: No focal lesion appreciated. The pancreatic duct is unremarkable. No 
peripancreatic inflammation or adenopathy. 
  
: The adrenal glands are unremarkable. No urolithiasis. No perinephric fat 
stranding appreciated. No hydroureteronephrosis seen. No acute pathology in the 
bladder. 
  
GI: The stomach is unremarkable. The small bowel is without evidence of 
obstruction. No small bowel wall thickening. The mesentery is without 
inflammation or adenopathy. The appendix is not definitively identified.  No 
pericolonic inflammation or wall thickening appreciated. 
  
 Aorta and retroperitoneum: No aortic aneurysm seen. No periaortic adenopathy 
seen. No fluid collections in the retroperitoneum appreciated. 
  
Abdominal wall/Inguinal: Unremarkable  
  
Musculoskeletal: Degenerative changes most pronounced at L5-S1 are noted. 
  
IMPRESSION IMPRESSION:  
1.   Multifocal groundglass opacities in the lower lobes and consolidation in 
the right middle lobe. This could be [facility] as well as other pulmonary 
infections. Interstitial pneumonitis is also in the differential. 
  
No acute pathology appreciated in the abdomen or pelvis. However, evaluation is 
mildly limited due to patient motion. 
   
 
XRAY (Most Recent) EKG No results found for this or any previous visit. 2D ECHO 07/19/20 ECHO ADULT FOLLOW-UP OR LIMITED 07/24/2020 7/24/2020 Narrative · LV: Normal cavity size and systolic function (ejection fraction normal). Moderately increased wall thickness. Estimated left ventricular ejection  
fraction is 60 - 65%. Visually measured ejection fraction.  Wall motion:  
normal. 
   
  Signed by: Yeison Gómez MD

## 2020-08-10 NOTE — PROGRESS NOTES
Chart reviewed. Pt remains CMO. Hospice has been consulted. Pt is COVID +, no longterm care benefits and family state that they are unable to care for pt at home. Dispo TBD, CM continues to follow along. Yordy Mcdonald RN BSN Care Manager [phone]

## 2020-08-10 NOTE — HOSPICE
Chart review in process. Will continue to follow until safe discharge plan. Thank you for the referral to [address] [place] [address]. Please contact [phone] with any questions or concerns. Svetlana Mercado RN Hospice Liaison [facility] [address], [address] 
[phone]

## 2020-08-10 NOTE — ROUTINE PROCESS
Bedside shift change report received from [address]. Report included SBAR, Kardex, MAR, Recent Results, and Plan of Care.

## 2020-08-10 NOTE — PROGRESS NOTES
Patient is not available to be assessed at this time.  set up a [employer] with the family. The  began the call with the family and then nursing care staff took the tablet into the room with the patient.  [facility]  
[phone]

## 2020-08-10 NOTE — ROUTINE PROCESS
Bedside shift change report given to Naima Kolb RN. Report included SBAR, Kardex, MAR, Recent Results, and Plan of Care.

## 2020-08-10 NOTE — PROGRESS NOTES
[facility] [phone] [facility]: [phone] 
  
Palliative Medicine follow up with Adrián Mead LCSW, and this writer. Homero Mitchell. Nikolay Lock is sitting up, eyes closed. Pt is non verbal but opened eyes after verbal  encouragement. Pt was reassured of her safety. Pt shows no signs of pain or agitation at this time. Remains on comfort measures.  
  
Comfort POST on file signed by patient's daughter, Keya Son 
  
DNR/DNI Comfort measures. Remains on comfort measures.   
  
  
Randall HECKN, RN, [facility] Palliative Medicine Inpatient  \[facility] [place] [facility]\""[facility]      
Palliative COPE Line: [phone] ([phone])

## 2020-08-10 NOTE — PROGRESS NOTES
[facility] Hospitalist Group Progress Note Patient: Arvis Halsted Age: 80 y.o. : 2/15/1929 MR#: [id number] SSN: [id number] Date: 8/10/2020 Subjective/24-hour events: No new issues. Assessment:  
COVID-19 infection with COVID-19 pneumonia Acute hypoxic respiratory failure secondary to above Acute metabolic encephalopathy Hypernatremia 2/2 volume depeletion NSTEMI Acute anemia concern for GI blood loss - occult blood positive this hospitalization ZION on CKD 3, improved DM2 with hyperglycemia Hypertension Abnormal cognition, suspect underlying dementia Severe protein calorie malnutrition Advanced age Plan: 
Continue comfort measures as ordered. Disposition issue:  Patient without LTC payor source and family unable to care for patient at home. CM working on placement in setting of aforementioned obstacles. Case discussed with:  []Patient  []Family  [x]Nursing  [x]Case Management DVT Prophylaxis:  []Lovenox  []Hep SQ  []SCDs  []Coumadin   []On Heparin gtt Objective:  
VS:  
Visit Vitals BP (!) 128/93 (BP 1 Location: Left arm, BP Patient Position: At rest) Pulse 70 Temp 97.3 °F (36.3 °C) Resp 18 Ht 5' 6\" (1.676 m) Wt 81.5 kg (179 lb 9.1 oz) SpO2 98% BMI 28.98 kg/m² Tmax/24hrs: Temp (24hrs), Av °F (36.1 °C), Min:96.8 °F (36 °C), Max:97.3 °F (36.3 °C) Intake/Output Summary (Last 24 hours) at 8/10/2020 1430 Last data filed at 8/10/2020 1000 Gross per 24 hour Intake 180 ml Output  Net 180 ml General: Nontoxic appearing. Cardiovascular: RRR Pulmonary: Accessory muscle use. GI:  Abdomen soft, nontender. Extremities: Warm, no edema or ischemia. Neuro: Awake and alert but confused. Labs:   
No results found for this or any previous visit (from the past 24 hour(s)). Signed By: Kaye Arnold MD   
 August 10, 2020

## 2020-08-11 NOTE — PROGRESS NOTES
Palliative Medicine [facility]: [phone]) [facility]: [phone] 
  
Palliative Medicine follow up Precious Robb, ANIBAL, and this writer met with pt at 11:30AM.  Ms. Yamini Tellez is lying in bed. Writhing in bed, pulling off sheet and gown and attempting to put hands into her month. Pt is non verbal but opened eyes after verbal  encouragement and reassured of her safety. Pt shows moaning when repositioned, alert Nurse staff of pt's current state. This writer attempted to offer fluids but pt did not accept and pushed them out of her mouth. Remains on comfort measures.  
  
Comfort POST on file signed by patient's daughter, Saima Silver 
  
DNR/DNI Comfort measures. Remains on comfort measures.   
  
Update- 1600- No dose of Roxanol given, per [facility], pt was resting when assessed. Explained medication regime and goal to focus on pt's comfort.  
   
Luis HECKN, RN, [facility] Palliative Medicine [facility]      
Palliative [facility] Line: [phone])

## 2020-08-11 NOTE — ROUTINE PROCESS
Bedside shift change report given to [facility] ZINA MORENO. Report included SBAR, Kardex, MAR, Recent Results, and Plan of Care.

## 2020-08-11 NOTE — PROGRESS NOTES
[facility] Hospitalist Group Progress Note Patient: Gene Jones Age: 80 y.o. : 2/15/1929 MR#: [id number] SSN: [id number] Date: 2020 Subjective/24-hour events: No new issues. Writhing in bed, pulling off sheet and gown. Assessment:  
COVID-19 infection with COVID-19 pneumonia Acute hypoxic respiratory failure secondary to above Acute metabolic encephalopathy Hypernatremia 2/2 volume depeletion NSTEMI Acute anemia concern for GI blood loss - occult blood positive this hospitalization ZION on CKD 3, improved DM2 with hyperglycemia Hypertension Abnormal cognition, suspect underlying dementia Severe protein calorie malnutrition Advanced age Plan: 
Continue comfort measures as ordered. Disposition issue:  Patient without LTC payor source and family unable to care for patient at home. CM working on placement in setting of aforementioned obstacles. Case discussed with:  []Patient  []Family  [x]Nursing  [x]Case Management DVT Prophylaxis:  []Lovenox  []Hep SQ  []SCDs  []Coumadin   []On Heparin gtt Objective:  
VS:  
Visit Vitals /51 (BP 1 Location: Left arm, BP Patient Position: At rest) Pulse 95 Temp 98 °F (36.7 °C) Resp 18 Ht 5' 6\" (1.676 m) Wt 79.2 kg (174 lb 8 oz) SpO2 93% BMI 28.17 kg/m² Tmax/24hrs: Temp (24hrs), Av.9 °F (36.6 °C), Min:97.4 °F (36.3 °C), Max:98.6 °F (37 °C) Intake/Output Summary (Last 24 hours) at 2020 1332 Last data filed at 2020 1222 Gross per 24 hour Intake 0 ml Output  Net 0 ml General: Nontoxic appearing. Cardiovascular: RRR Pulmonary: Accessory muscle use. GI:  Abdomen soft, nontender. Extremities: Warm, no edema or ischemia. Neuro: Awake and alert but confused. Labs:   
No results found for this or any previous visit (from the past 24 hour(s)). Signed By: Alfred Murdock MD   
 2020

## 2020-08-11 NOTE — PROGRESS NOTES
Bedside and Verbal shift change report given to Brianne Espinosa RN (oncoming nurse) by Antoinette Olivarez RN (offgoing nurse). Report included the following information SBAR and Kardex.

## 2020-08-12 NOTE — PROGRESS NOTES
Chart reviewed. Pt remains CMO. Hospice has been consulted. Pt is COVID +, no longterm care benefits and family state that they are unable to care for pt at home. Dispo TBD. 
  
Bolivar Rhoades BSN Care Manager [phone]

## 2020-08-12 NOTE — CONSULTS
Palliative Medicine Consult DR. [facility]: [phone] ([phone] [facility]: [phone] [facility]: [phone] Patient Name: Stacia Barrera YOB: 1929 Date of Initial Consult: 7/23/2020, follow up 7/24/2020, 7/27/2020, 7/28/2020, 7/30/2020, 7/31/2020, 8/3/2020, 8/4/2020, 8/6/2020, 8/12/2020 Reason for Consult: Care decisions Requesting Provider: Carolyne Ortiz MD 
Primary Care Physician: Mirtha Stein MD 
  
 SUMMARY:  
Stacia Barrera is a 80y.o. year old female with a past history of HTN, MV disorder, hyperlipidemia, and DM type 2 who was admitted on 7/19/2020 from home with a diagnosis of NSTEMI and COVID-19 Pneumonia. Current medical issues leading to Palliative Medicine involvement include: Care decisions. 8/12/2020 resting comfortably this am, did not really respond to her name. Appears comfortable. Updated family. 8/6/2020: To preserve PPE, patient was seen from hallway. Patient seems to be awake, moving upper extremities. Seems to be in NAD. Continues on comfort measures. 8/4/2020 To preserve PPE, patient was seen on the video camera and spoke with the bedside RN. Patient seems to be awake, moving her upper extremities spontaneously,  
moving her head from side to side spontaneously. The bedside RN said the patient remains confused and moaning at times; remains on high flow O2 at 5 liters; temperature is 94°F. H&H 6.1 g/dL/19.5% on 8/3/2020. DNR/DNI, comfort measures, no tube feedings, no blood transfusion. 8/3/2020 To preserve PPE, update on patient's condition was obtained from the bedside RN. She said patient is not eating, confused, yelling at times, with soft wrist restraints in place on both hands. DNR/DNI with tube feedings 7/31/2020 calm at time we saw. Spoke with nurse agitated last night with yelling out. Spoke with family including daughter Francois Bryant and grand daughters. No decision on comfort measures. DNR/DNI 2020 yelling out at times. Confused. Poor po intake 2020 agitated at times, calling for sister who is . Long conversation with daughter Clarisse Corbett, granddaughter Damian Vick. Discussed comfort option. They are discussing. 2020 seen this am around 1100. High flow donned. Alert eyes open. Spoke with daughter Shannon Pichardo and son Jesusita Jalloh via phone. DNR/DNI  
 
2020 comfortable appearing. Calm, in NAD  
 PALLIATIVE DIAGNOSES:  
1.Goals of care 2. Non-ST elevation MI 
3. Multifocal pneumonia 4. COVID-19 infection PLAN:  
2020 follow up on Ms Richard Guerrero who remains on comfort measures. She did not alert to her name. Appears comfortable, face serene. CNA at bedside. Continues to have poor po. Changed diet to full liquids to see if this will help with some intake. Updated family daughter Ms Amalia Flynn and grand daughter Damian Vick. Shared with them continued poor po and change to full liquids. Her poor intake is expected as she continues to decline and this was shared with family. Focus is on comfort and feeds that she may find pleasurable. Family voiced their understanding. Offered support to family. Answered all questions to the best of my ability. Goals of care DNR/DNI comfort measures, no feeding tubes. ( please see below for previous conversations)  
 
2020: To preserve PPE, patient was seen from hallway. Patient seems to be awake, moving upper extremities. Seems to be in NAD. Continues on comfort measures. Remains COVID +. Support to family as they cannot come to visit due to visitor restrictions in place in response to COVID-19. Goals of care unchanged. Continue DNR/DNI, comfort measures, NO feeding tubes. Signed POST now on file. Hospice and CM working with family to determine discharge planning, appreciate assistance. Will continue to follow for support and symptom management. 2020 Follow up:  To preserve PPE, patient was seen via the video camera and spoke with the bedside RN. Patient seems to be awake, moving her upper extremities spontaneously, moving her head from side to side spontaneously. The bedside RN said the patient remains confused and moaning at times; remains on high flow O2 at 5 liters; temperature is 94°F. H&H 6.1 g/dL/19.5% on 8/3/2020. Spoke with Mal Forman, granddaughter and Natalia Keene, daughter of patient via the telephone today. They were updated on patient's condition. Reassurance and emotional support given to family. Discussed and explained the goals, benefits and burdens of comfort care. POST form was discussed; that Ms. Amira Aggarwal, daughter of patient needs to sign it. The form will be emailed to Mal Forman, granddaughter and she will forward it to her mother for signature via docu-sign, then she will e-mail it back to us. I confirmed with the family that NO blood transfusion will be given. Dr. Royce Escalante was made aware that family said NO to the blood transfusion; that they want patient to be on comfort care. He was also made aware that family is requesting for a repeat COVID-19 testing. Comfort care orders and IP Hospice referral were placed. BSRN and the  were notified of comfort care order. GOC: DNR/DNI, COMFORT CARE, no blood transfusion, no tube feedings. 8/3/2020 Follow up: To preserve PPE, patient's current condition was discussed with bedside RN. She said patient remains confused, not eating, yelling at times, with soft wrist restraints in place on both hands. Patient's family Dheeraj Lizarraga, granddaughter and Amiar Aggarwal, daughter) called to let us know of their decision about tube feeding. They want to start  feeding her via the naso-gastric tube temporarily. They are hoping that patient's confusion will clear up if she is able to get nutrition. They said they had a video  call yesterday and heard patient yelling and they thought that patient is responding to them.  The benefits and burdens of tube feeding were discussed with the family. Information on tube feeding will be emailed to the family by POOJA Holt RN. Dr. Idalia Putnam was made aware of the family's decision on tube feeding. DNR/DNI with tube feedings. 7/31/2020  Follow up on Ms Alma Delia Tyson. She is lying in bed quiet this AM. Discussed with nurse agitated last night received ativan. Long conversation with daughter Amira Aggarwal grand daughters Balaji Carr and Yoselin Schroeder. Clinical update given, including unfortunately despite best treatment her mentation is not improving nor is her appetite. Discussed aggressive care moving forward, nutrition will need to be addressed including feeding tube placement. We shared the burdens of these efforts at some length with family. Comfort measures discussed at some length with family. We asked family to consider what Carlota Dailey would want and tell them to do if she were able and understanding her illness and how this affects her quality of life. Family did share they would not be able to care for patient at home as there is limited family available. No care decisions were made today. Goals of care DNR/DNI limited inventions, feeding tube to be decided. Further care decisions to be decided on by family. Family asked for time to re discuss with all family members. 7/30/2020 follow up this am. To preserve PPE viewed through monitor as she is COVID  19+ . Discussed with RN. Remains agitated at times. Yelling out. MAR reviewed Ativan 0.5 at 0100 this am, received x 2 yesterday. Despite best treatment she continues to be confused agitated not eating. Appreciate conversations by attending with daughter Amira Aggarwal. She indicated to him she would like comfort measures here in hospital, but would be unable to care for her mother at home. Spoke with grand daughter Mal Forman ( at [facility] request I call her) Reviewed medical condition at some detail.  During conversation attending spoke with patient's daughter Kusum Ovalle who indicated she does not wish for her mother to suffer and wishes to embark on a more  comfort directed approach. Discussed with Jasen Casey who asked we continue current level of care so she can speak with her mother to ensure she completley understood conversation. Message left with attending on above. Comfort measures discussed again in detail with Jasen Greenfieldon. Goals of care DNR/DNI  
 
 
 
.2020 follow up along with Ms Hannah Ocasio RN. Agitated, calling for her  sister Aaron Gil, \" take my hand [place]\" Family was able to participate in video ( daughter Irwin Watkins, grand daughter Jasen Casey, and other family members) They were visibly upset and could be patient's distress. After video participated in family conference via phone with the above attendees. Updated on overall condition. Honest but compassionate discussion. Unfortunately despite best treatment and efforts she is declining, not eating, confused agitated. All likely due to the effects of COVD 19 and MI. All questions answered to the best of our ability. Discussed moving to comfort measures, symptom management, vs continuing current course. Hospice at home also discussed. Difficult discussions with family. We offered support in this difficult time. Shared with family unfortunately Ms Gutierrez's prognosis is poor and although difficult to predict her time maybe short. Family wishes to consider all options. Currently no change in care decisions, continue treatment. Goals of care DNR/DNI limited interventions. Continue current level of care. Attending updated on conversation. 2020 follow up on Ms Val Collier. Noted on requiring high flow oxygen. Seen through window due to COVID 19 isolation. At time of our visit around 1100 she appeared calm, eyes open moving in bed. Discussed with BSRN no issues at that time. Poor po. Spoke with daughter Kusum Ovalle, and son Christiano Mirza via phone. Updated on clinical condition as of 1100.  Re addressed goals of care discussed with both Elvia Tony and Jimmy DNR/DNI. Ms Nadira Vernon and Ms Juan Merino NP were witnesses to conversation. Plan on video conference with family at 9:30 tomorrow. We also shared with daughter, with advanced age and COVID 23 clinical condition can be very fluid, meaning her condition can change rapidly. Family is very hopeful and prayerful she will recover. Goals of care DNR/DNI continue current medical treatment. Please note at time of our call to the family not aware of code stroke being called. Attending and BSRN aware of code change. 7/24/2020 follow up along with Ms Corry Hogan RN. To preserve PPE patient evaluated via video monitor. She is calm. Spoke with RN calm for the most part. She remains confused. Spoke with another Elizabeth daughter Will Clemente. Ms Rosalind Oglesby also shared the call with her friend Shannontierney Mejia who is palliative RN. We again addressed goals of care burdens of resuscitation, with advanced age  And the effect on her quality of life. All very understanding of information and they are continuing discussions which we highly encouraged. We have also shared with the family the possibility she may not return to her baseline level of functioning or cognition. Goals of care full code with full interventions. Goals of care: 
    Saw patient today, she is awake, alert, moving all extremities spontaneously and to purpose. She is trying to remove medical equipments and monitors which are attached to her. She appears to be confused. She is not in any distress. Called Oracio Kaufman, son of patient when unable to contact daughter, Caryl Choudhury. Oracio Kaufman said to talk to his sister, Caryl Choudhury about patient. We were able to talk to Caryl Choudhury and patient's granddaughter today. They said that patient is very active and independent with her ADL prior to this admission. They denied noticing confusion or any other mental issues with patient.  They said that patient is mentally alert and very capable of deciding for herself prior to admission. They also said patient is able to walk and goes to the grocery store. Granddaughter said patient and her family has not had any discussion about AMD and goals of care. Discussed the benefits and burdens of goals of care with the granddaughter and with Ms. Caryl Choudhury. Social: has multiple recent deaths in the family ( son, nephew); one of her son is sick and in the hospital, per granddaughter. 2. Non-ST elevation MI: 
    Patient is on telemetry monitor and on IV Heparin 3. Multifocal pneumonia:  
    Patient is on O2 at 4 liters via NC with O2 saturation above 90%. She has no shortness of breath. 4. COVID-19 infection: 
    Rapid COVID-19 test on 7/21/2020 was detected. TREATMENT PREFERENCES:  
Code Status: DNR/DNI Advance Care Planning: 
[] The [facility] Interdisciplinary Team has updated the ACP Navigator with [address] and Patient Capacity Primary Decision Maker (Legal next of kin): Has no MPOA. Pt does not have an Advance Directive on file in EMR and is not able to complete one currently. Legal Next of Kin would remain as the medical decision maker at this time since Ms. Atri Thakkar is confused. Pt is a  and has two living adult children. Health care decision making will fall to a consensus of Caryl Choudhury (daughter) and Dante Gutierrez(son) Call placed to Dante Otero, he requested we contact his sister Julio C Albino she is the caregiver and makes all the decisions. Rene Nissen, daughter - home number: [phone], mobile number: [phone], e-mail: [email or web address]. Medical Interventions: Comfort measures Artificially Administered Nutrition: No feeding tube Other: As far as possible, the palliative care team has discussed with patient / health care proxy about goals of care / treatment preferences for patient. HISTORY:  
 
History obtained from: Chart, Daughter, Granddaughter CHIEF COMPLAINT: NSTEMI, COVID - 19 infection HPI/SUBJECTIVE: The patient is:  
[] Verbal and participatory [x] Non-participatory due to: confusion Clinical Pain Assessment (nonverbal scale for nonverbal patients):  
Patient moans at times per bedside RN Activity (Movement): Lying quietly, normal position Duration: for how long has pt been experiencing pain (e.g., 2 days, 1 month, years) Frequency: how often pain is an issue (e.g., several times per day, once every few days, constant) FUNCTIONAL ASSESSMENT:  
 
Palliative Performance Scale (PPS):  20% PPS: 20 
 
ECOG 
ECOG Status : Completely disabled PSYCHOSOCIAL/SPIRITUAL SCREENING:  
  
Any spiritual / Pentecostalism concerns: unable to assess 
[] Yes /  [] No 
 
Caregiver Burnout: 
[] Yes /  [] No /  [x] No Caregiver Present Anticipatory grief assessment: unable to assess 
[] Normal  / [] Maladaptive REVIEW OF SYSTEMS:  
 
Positive and pertinent negative findings in ROS are noted above in HPI. The following systems were [x] reviewed / [] unable to be reviewed as noted in HPI Other findings are noted below. Systems: constitutional, ears/nose/mouth/throat, respiratory, gastrointestinal, genitourinary, musculoskeletal, integumentary, neurologic, psychiatric, endocrine. Positive findings noted below. Modified ESAS Completed by: provider Pain: 0 Dyspnea: 0 Stool Occurrence(s): 1 PHYSICAL EXAM:  
 
Wt Readings from Last 3 Encounters:  
08/12/20 78.9 kg (174 lb)  
01/26/17 82.6 kg (182 lb)  
08/25/16 83 kg (183 lb) Blood pressure 126/45, pulse 69, temperature 98 °F (36.7 °C), resp. rate 20, height 5' 6\" (1.676 m), weight 78.9 kg (174 lb), SpO2 91 %. Pain: 
Pain Scale 1: Adult Nonverbal Pain Scale Pain Intensity 1: 0 Pain Intervention(s) 1: Medication (see MAR) Last bowel movement: x 1 today Constitutional: appeared comfortable did not respond to me today Respiratory: breathing not labored, remains on nasal cannula HISTORY:  
 
Principal Problem: 
  Pneumonia due to 2019 novel coronavirus (7/26/2020) Active Problems: 
  NSTEMI (non-ST elevated myocardial infarction) ([facility] [address] (7/20/2020) PNA (pneumonia) (7/21/2020) Goals of care, counseling/discussion () COVID-19 () Past Medical History:  
Diagnosis Date  Essential hypertension, benign  Mitral valve disorders(424.0) Moderate MR  
 Other and unspecified hyperlipidemia  Other specified cardiac dysrhythmias(427.89) Non-sustained VT on Holter in past  
 Type II or unspecified type diabetes mellitus without mention of complication, not stated as uncontrolled History reviewed. No pertinent surgical history. Family History Problem Relation Age of Onset  Heart Disease Other Positive family history of ischemic heart disease. History reviewed, no pertinent family history. Social History Tobacco Use  Smoking status: Never Smoker  Smokeless tobacco: Never Used Substance Use Topics  Alcohol use: No  
 
Allergies Allergen Reactions  Minoxidil Other (comments) edema Current Facility-Administered Medications Medication Dose Route Frequency  LORazepam (INTENSOL) 2 mg/mL oral concentrate 0.5 mg  0.5 mg SubLINGual TID  ondansetron (ZOFRAN) injection 4 mg  4 mg IntraVENous Q4H PRN  
 LORazepam (INTENSOL) 2 mg/mL oral concentrate 0.5 mg  0.5 mg SubLINGual Q4H PRN  
 acetaminophen (TYLENOL) suppository 650 mg  650 mg Rectal Q4H PRN  
 bisacodyL (DULCOLAX) suppository 10 mg  10 mg Rectal DAILY PRN  
 morphine (ROXANOL) concentrated oral syringe 2.6 mg  2.6 mg SubLINGual Q4H PRN  
 
 
 LAB AND IMAGING FINDINGS:  
 
Lab Results Component Value Date/Time WBC 8.3 08/04/2020 02:28 AM  
 HGB 6.1 (L) 08/04/2020 02:28 AM  
 PLATELET 049 (L) 45/62/0429 02:28 AM  
 
Lab Results Component Value Date/Time Sodium 153 (H) 08/04/2020 02:28 AM  
 Potassium 3.7 08/04/2020 02:28 AM  
 Chloride 125 (H) 08/04/2020 02:28 AM  
 CO2 18 (L) 08/04/2020 02:28 AM  
 BUN 26 (H) 08/04/2020 02:28 AM  
 Creatinine 1.69 (H) 08/04/2020 02:28 AM  
 Calcium 7.2 (L) 08/04/2020 02:28 AM  
 Magnesium 2.1 07/23/2020 05:29 AM  
  
Lab Results Component Value Date/Time Alk. phosphatase 64 07/26/2020 04:31 AM  
 Protein, total 6.7 07/26/2020 04:31 AM  
 Albumin 2.6 (L) 07/26/2020 04:31 AM  
 Globulin 4.1 (H) 07/26/2020 04:31 AM  
 
Lab Results Component Value Date/Time INR 1.4 (H) 07/23/2020 05:29 AM  
 Prothrombin time 16.8 (H) 07/23/2020 05:29 AM  
 aPTT 27.5 07/28/2020 03:14 AM  
  
Lab Results Component Value Date/Time Ferritin 223 08/04/2020 02:28 AM  
  
No results found for: PH, PCO2, PO2 No components found for: [place] Lab Results Component Value Date/Time  07/21/2020 02:03 AM  
 CK - MB 4.6 (H) 07/19/2020 08:10 PM  
  
 
   
 
Total time: 25 minutes Counseling / coordination time, spent as noted above: 20 minutes 
> 50% counseling / coordination: Yes, with RN Prolonged service was provided for  []30 min   []75 min in face to face time in the presence of the patient, spent as noted above. Time Start:  
Time End:  
Note: this can only be billed with 74274 (initial) or 19256 (follow up). If multiple start / stop times, list each separately.

## 2020-08-12 NOTE — PROGRESS NOTES
visited with the family of Saima Kasper, who is a 80 y.o.,female. The  provided the following Interventions: 
 attempted to hold a Zoom call with the family. No one from the family joined the meeting.  will reschedule the call for tomorrow. Plan: 
Chaplains will continue to follow and will provide pastoral care on an as needed/requested basis.  recommends bedside caregivers page  on duty if patient shows signs of acute spiritual or emotional distress. in [facility]  
[phone]

## 2020-08-12 NOTE — PROGRESS NOTES
[facility] Hospitalist Group Progress Note Patient: Yahir Yost Age: 80 y.o. : 2/15/1929 MR#: [id number] SSN: [id number] Date: 2020 Subjective/24-hour events: No new issues. Non-verbal. Moaning. Pulling off sheets Assessment:  
COVID-19 infection with COVID-19 pneumonia Acute hypoxic respiratory failure secondary to above Acute metabolic encephalopathy Hypernatremia 2/2 volume depeletion NSTEMI Acute anemia concern for GI blood loss - occult blood positive this hospitalization ZION on CKD 3, improved DM2 with hyperglycemia Hypertension Abnormal cognition, suspect underlying dementia Severe protein calorie malnutrition Advanced age Plan: 
Continue comfort measures as ordered. Disposition issue:  Patient without LTC payor source and family unable to care for patient at home. CM working on placement in setting of aforementioned obstacles. Case discussed with:  [x]Patient  []Family  [x]Nursing  [x]Case Management DVT Prophylaxis:  []Lovenox  []Hep SQ  []SCDs  []Coumadin   []On Heparin gtt Objective:  
VS:  
Visit Vitals /45 (BP 1 Location: Left arm, BP Patient Position: At rest) Pulse 69 Temp 98 °F (36.7 °C) Resp 20 Ht 5' 6\" (1.676 m) Wt 78.9 kg (174 lb) SpO2 91% BMI 28.08 kg/m² Tmax/24hrs: Temp (24hrs), Av.8 °F (36.6 °C), Min:97.6 °F (36.4 °C), Max:98 °F (36.7 °C) Intake/Output Summary (Last 24 hours) at 2020 1708 Last data filed at 2020 3674 Gross per 24 hour Intake 0 ml Output  Net 0 ml General: Nontoxic appearing. Cardiovascular: RRR Pulmonary: Accessory muscle use. GI:  Abdomen soft, nontender. Extremities: Warm, no edema or ischemia. Neuro: Awake and alert but confused. Labs:   
No results found for this or any previous visit (from the past 24 hour(s)). Signed By: Jodi Delgado MD   
 2020

## 2020-08-12 NOTE — ROUTINE PROCESS
Bedside and Verbal shift change report given to [facility] RN (oncoming nurse) by Jose Manuel Thakkar (offgoing nurse). Report included the following information SBAR, Kardex, MAR and Recent Results. Patient quiet on round, bed alarm on. Respirations even, no distress.

## 2020-08-13 NOTE — PROGRESS NOTES
Received report on pt.from off going RN. Lying in bed on rounds. Non verbal. Pulls away from care being given. Eyes clinched. 02 on. Bed alarms on. Side rails up x 3. Remains on comfort care. No acute distress noted. Will cont to monitor fro any changes in status. 1200 resting in bed w/o noted distress. Curls up in semi fetal position. Becomes agitated when moved or care given. 1600 frequently pulls off gown. Seems quieter and less restless when gown off. Covered with sheet. 1800 no change in status. Remains quiet in bed and seems comfortable at this time. 1930 Bedside and Verbal shift change report given to Jose IZAGUIRRE (oncoming nurse) by Jose Antonio Singer RN (offgoing nurse). Report given with Robert SCHMID and MAR.

## 2020-08-13 NOTE — ROUTINE PROCESS
Bedside and verbal report received from [address] (offgoing nurse). Report included the following information; SBAR, MAR, LABS, Intake/output, Kardex, and summary of care. Patient quietly resting in bed. Bed alarm on. Nasal cannula in placed. Will continue to monitor.

## 2020-08-13 NOTE — HOSPICE
[facility]. [facility]. Please contact [phone] with any questions or concerns. UMANG McdanielN, RN Clinical Coordinator [place] [address] [place], [address] [place] [address] Str. 
[phone]

## 2020-08-13 NOTE — PROGRESS NOTES
Nutrition Assessment Type and Reason for Visit: Jayde Miranda Nutrition Recommendations/Plan:  
- Continue current diet for comfort as tolerated. Nutrition Assessment:  Comfort measures only. Changed to full liquids 2/2 continued poor intake. Noted 9#, 4.9% wt loss x week with inadequate intake. Malnutrition Assessment: 
Malnutrition Status: Severe malnutrition Estimated Daily Nutrient Needs: 
Energy (kcal):  3429-0034 Protein (g):  62-77 Fluid (ml/day):  3708-0671 Nutrition Related Findings:  BM 8/9. AMS Current Nutrition Therapies: DIET FULL LIQUID Anthropometric Measures: 
· Height:  5' 6\" (167.6 cm) · Current Body Wt:  78.9 kg (173 lb 15.1 oz) · BMI: 28.1 Nutrition Diagnosis:  
· Inadequate oral intake related to cognitive or neurological impairment, swallowing difficulty as evidenced by intake 0-25% Nutrition Intervention: 
Food and/or Nutrient Delivery: Continue current diet Nutrition Education and Counseling: No recommendations at this time Coordination of Nutrition Care: No recommendation at this time Goals: 
Provide nutrition intervention as appropriate with goals of care for the next 7 days. Nutrition Monitoring and Evaluation:  
Food/Nutrient Intake Outcomes: Food and nutrient intake Physical Signs/Symptoms Outcomes: Meal time behavior, Nutrition focused physical findings Discharge Planning:   
No discharge needs at this time Electronically signed by Genia Ocampo RD on 8/13/2020 at 9:06 AM 
 
Contact Number: [phone]

## 2020-08-13 NOTE — CONSULTS
Palliative Medicine Consult DR[facility]: [phone] [facility]: [phone] [facility]: [phone] Patient Name: Selam Zhong YOB: 1929 Date of Initial Consult: 7/23/2020, follow up 7/24/2020, 7/27/2020, 7/28/2020, 7/30/2020, 7/31/2020, 8/3/2020, 8/4/2020, 8/6/2020, 8/12/2020, 8/13/2020 Reason for Consult: Care decisions Requesting Provider: Mariza Sage MD 
Primary Care Physician: Ursula Walker MD 
  
 SUMMARY:  
Selam Zhong is a 80y.o. year old female with a past history of HTN, MV disorder, hyperlipidemia, and DM type 2 who was admitted on 7/19/2020 from home with a diagnosis of NSTEMI and COVID-19 Pneumonia. Current medical issues leading to Palliative Medicine involvement include: Care decisions. 8/13/2020 lying in bed gown pulled off. Tried to feed her ice cream but she did not take. Remains on comfort measures, DNR/DNI, no feeding tubes. 8/12/2020 resting comfortably this am, did not really respond to her name. Appears comfortable. Updated family. 8/6/2020: To preserve PPE, patient was seen from hallway. Patient seems to be awake, moving upper extremities. Seems to be in NAD. Continues on comfort measures. 8/4/2020 To preserve PPE, patient was seen on the video camera and spoke with the bedside RN. Patient seems to be awake, moving her upper extremities spontaneously,  
moving her head from side to side spontaneously. The bedside RN said the patient remains confused and moaning at times; remains on high flow O2 at 5 liters; temperature is 94°F. H&H 6.1 g/dL/19.5% on 8/3/2020. DNR/DNI, comfort measures, no tube feedings, no blood transfusion. 8/3/2020 To preserve PPE, update on patient's condition was obtained from the bedside RN. She said patient is not eating, confused, yelling at times, with soft wrist restraints in place on both hands. DNR/DNI with tube feedings 2020 calm at time we saw. Spoke with nurse agitated last night with yelling out. Spoke with family including daughter Rosa Maria Larose and grand daughters. No decision on comfort measures. DNR/DNI  
 
2020 yelling out at times. Confused. Poor po intake 2020 agitated at times, calling for sister who is . Long conversation with daughter Rosa Maria Larose, granddaughter Luz Elena Aguiar. Discussed comfort option. They are discussing. 2020 seen this am around 1100. High flow donned. Alert eyes open. Spoke with daughter Rudi Grijalva and son Caitlyn Mcfadden via phone. DNR/DNI  
 
2020 comfortable appearing. Calm, in NAD  
 PALLIATIVE DIAGNOSES:  
1.Goals of care 2. Non-ST elevation MI 
3. Multifocal pneumonia 4. COVID-19 infection PLAN:  
2020 seen in full PPE this afternoon, gown pulled off, able to re dress, appears to be in pain , discussed with RN she will medicate. Attempted to feed her ice cream, she would not take. Per RN did not take breakfast for her. Briefs remaining dry. She continues to decline as expected, family is aware. Goals of care DNR/DNI comfort measures no feeding tubes. ( please see below for previous conversations) 2020 follow up on Ms Montrell Srinivasan who remains on comfort measures. She did not alert to her name. Appears comfortable, face serene. CNA at bedside. Continues to have poor po. Changed diet to full liquids to see if this will help with some intake. Updated family daughter Ms Lauro Burciaga and grand daughter Luz Elena Aguiar. Shared with them continued poor po and change to full liquids. Her poor intake is expected as she continues to decline and this was shared with family. Focus is on comfort and feeds that she may find pleasurable. Family voiced their understanding. Offered support to family. Answered all questions to the best of my ability. Goals of care DNR/DNI comfort measures, no feeding tubes. 2020: To preserve PPE, patient was seen from hallway.  Patient seems to be awake, moving upper extremities. Seems to be in NAD. Continues on comfort measures. Remains COVID +. Support to family as they cannot come to visit due to visitor restrictions in place in response to COVID-19. Goals of care unchanged. Continue DNR/DNI, comfort measures, NO feeding tubes. Signed POST now on file. Hospice and CM working with family to determine discharge planning, appreciate assistance. Will continue to follow for support and symptom management. 8/4/2020 Follow up: To preserve PPE, patient was seen via the video camera and spoke with the bedside RN. Patient seems to be awake, moving her upper extremities spontaneously, moving her head from side to side spontaneously. The bedside RN said the patient remains confused and moaning at times; remains on high flow O2 at 5 liters; temperature is 94°F. H&H 6.1 g/dL/19.5% on 8/3/2020. Spoke with Damian Vick, granddaughter and Clarisse Corbett, daughter of patient via the telephone today. They were updated on patient's condition. Reassurance and emotional support given to family. Discussed and explained the goals, benefits and burdens of comfort care. POST form was discussed; that Ms. Shannon Pichardo, daughter of patient needs to sign it. The form will be emailed to Damian Mansfieldchery, granddaughter and she will forward it to her mother for signature via docu-sign, then she will e-mail it back to us. I confirmed with the family that NO blood transfusion will be given. Dr. Nakita Dumont was made aware that family said NO to the blood transfusion; that they want patient to be on comfort care. He was also made aware that family is requesting for a repeat COVID-19 testing. Comfort care orders and IP Hospice referral were placed. BSRN and the  were notified of comfort care order. GOC: DNR/DNI, COMFORT CARE, no blood transfusion, no tube feedings. 8/3/2020 Follow up: To preserve PPE, patient's current condition was discussed with bedside RN. She said patient remains confused, not eating, yelling at times, with soft wrist restraints in place on both hands. Patient's family Juni Mckeon, granddaughter and Phoenix Parsons, daughter) called to let us know of their decision about tube feeding. They want to start  feeding her via the naso-gastric tube temporarily. They are hoping that patient's confusion will clear up if she is able to get nutrition. They said they had a video  call yesterday and heard patient yelling and they thought that patient is responding to them. The benefits and burdens of tube feeding were discussed with the family. Information on tube feeding will be emailed to the family by POOJA Bañuelos RN. Dr. Jamey Hunt was made aware of the family's decision on tube feeding. DNR/DNI with tube feedings. 7/31/2020  Follow up on Ms Brad Millard. She is lying in bed quiet this AM. Discussed with nurse agitated last night received ativan. Long conversation with daughter Phoenix Parsons grand daughters Noreen Suarez and Devaughn Aden. Clinical update given, including unfortunately despite best treatment her mentation is not improving nor is her appetite. Discussed aggressive care moving forward, nutrition will need to be addressed including feeding tube placement. We shared the burdens of these efforts at some length with family. Comfort measures discussed at some length with family. We asked family to consider what Joseph Rousseau would want and tell them to do if she were able and understanding her illness and how this affects her quality of life. Family did share they would not be able to care for patient at home as there is limited family available. No care decisions were made today. Goals of care DNR/DNI limited inventions, feeding tube to be decided. Further care decisions to be decided on by family. Family asked for time to re discuss with all family members.   
 
 
 7/30/2020 follow up this am. To preserve PPE viewed through monitor as she is COVID  19+ . Discussed with RN. Remains agitated at times. Yelling out. MAR reviewed Ativan 0.5 at 0100 this am, received x 2 yesterday. Despite best treatment she continues to be confused agitated not eating. Appreciate conversations by attending with daughter Flaco Ambriz. She indicated to him she would like comfort measures here in hospital, but would be unable to care for her mother at home. Spoke with grand daughter Ana Maria Hall ( at [facility] request I call her) Reviewed medical condition at some detail. During conversation attending spoke with patient's daughter Flaco Ambriz who indicated she does not wish for her mother to suffer and wishes to embark on a more  comfort directed approach. Discussed with Ana Maria Hall who asked we continue current level of care so she can speak with her mother to ensure she completley understood conversation. Message left with attending on above. Comfort measures discussed again in detail with Ana Maria Hall. Goals of care DNR/DNI  
 
 
 
.2020 follow up along with Ms Shaan Latham RN. Agitated, calling for her  sister Onelia Bhatia, \" take my hand Sakshi\" Family was able to participate in video ( daughter Rufino Carlisle, grand daughter Ana Maria Hall, and other family members) They were visibly upset and could be patient's distress. After video participated in family conference via phone with the above attendees. Updated on overall condition. Honest but compassionate discussion. Unfortunately despite best treatment and efforts she is declining, not eating, confused agitated. All likely due to the effects of COVD 19 and MI. All questions answered to the best of our ability. Discussed moving to comfort measures, symptom management, vs continuing current course. Hospice at home also discussed. Difficult discussions with family. We offered support in this difficult time.  Shared with family unfortunately Ms Gutierrez's prognosis is poor and although difficult to predict her time maybe short. Family wishes to consider all options. Currently no change in care decisions, continue treatment. Goals of care DNR/DNI limited interventions. Continue current level of care. Attending updated on conversation. 7/27/2020 follow up on Ms Juan Jarvis. Noted on requiring high flow oxygen. Seen through window due to COVID 19 isolation. At time of our visit around 1100 she appeared calm, eyes open moving in bed. Discussed with BSRN no issues at that time. Poor po. Spoke with daughter Sera Pelaez, and son Guyann Ahumada via phone. Updated on clinical condition as of 1100. Re addressed goals of care discussed with both Sara and Jimmy DNR/DNI. Ms Gm Forbes and Ms Aleksandar Robertson NP were witnesses to conversation. Plan on video conference with family at 9:30 tomorrow. We also shared with daughter, with advanced age and COVID 23 clinical condition can be very fluid, meaning her condition can change rapidly. Family is very hopeful and prayerful she will recover. Goals of care DNR/DNI continue current medical treatment. Please note at time of our call to the family not aware of code stroke being called. Attending and BSRN aware of code change. 7/24/2020 follow up along with Ms Yu Whitaker RN. To preserve PPE patient evaluated via video monitor. She is calm. Spoke with RN calm for the most part. She remains confused. Spoke with another [address] daughter Katelyn Samaniego. Ms Loy Angeles also shared the call with her friend Judithann Canavan who is palliative RN. We again addressed goals of care burdens of resuscitation, with advanced age  And the effect on her quality of life. All very understanding of information and they are continuing discussions which we highly encouraged. We have also shared with the family the possibility she may not return to her baseline level of functioning or cognition. Goals of care full code with full interventions.   
 
 
 
Goals of care: 
    Saw patient today, she is awake, alert, moving all extremities spontaneously and to purpose. She is trying to remove medical equipments and monitors which are attached to her. She appears to be confused. She is not in any distress. Called Judd Zelaya, son of patient when unable to contact daughter, Jordan Grandchild. Judd Zelaya said to talk to his sister, Jordan Leechild about patient. We were able to talk to Jordan Grandchild and patient's granddaughter today. They said that patient is very active and independent with her ADL prior to this admission. They denied noticing confusion or any other mental issues with patient. They said that patient is mentally alert and very capable of deciding for herself prior to admission. They also said patient is able to walk and goes to the grocery store. Granddaughter said patient and her family has not had any discussion about AMD and goals of care. Discussed the benefits and burdens of goals of care with the granddaughter and with Ms. Jordan Grandmax. Social: has multiple recent deaths in the family ( son, nephew); one of her son is sick and in the hospital, per granddaughter. 2. Non-ST elevation MI: 
    Patient is on telemetry monitor and on IV Heparin 3. Multifocal pneumonia:  
    Patient is on O2 at 4 liters via NC with O2 saturation above 90%. She has no shortness of breath. 4. COVID-19 infection: 
    Rapid COVID-19 test on 7/21/2020 was detected. TREATMENT PREFERENCES:  
Code Status: DNR/DNI Advance Care Planning: 
[] The [facility] Interdisciplinary Team has updated the ACP Navigator with [address] and Patient Capacity Primary Decision Maker (Legal next of kin): Has no MPOA. Pt does not have an Advance Directive on file in EMR and is not able to complete one currently. Legal Next of Kin would remain as the medical decision maker at this time since Ms. Jessica Gregorio is confused. Pt is a  and has two living adult children.  Health care decision making will fall to a consensus of Kenan Bayron (daughter) and Susan Gutierrez(son) Call placed to Susan Nelson, he requested we contact his sister Jaron Michelle she is the caregiver and makes all the decisions. Denise Hernandez, daughter - home number: [phone], mobile number: [phone], e-mail: [email or web address]. Medical Interventions: Comfort measures Artificially Administered Nutrition: No feeding tube Other: As far as possible, the palliative care team has discussed with patient / health care proxy about goals of care / treatment preferences for patient. HISTORY:  
 
History obtained from: Chart, Daughter, Granddaughter CHIEF COMPLAINT: NSTEMI, COVID - 19 infection HPI/SUBJECTIVE: The patient is:  
[] Verbal and participatory [x] Non-participatory due to: confusion Clinical Pain Assessment (nonverbal scale for nonverbal patients):  
Patient moans at times per bedside RN Activity (Movement): Seeking attention through movement or slow, cautious movement Duration: for how long has pt been experiencing pain (e.g., 2 days, 1 month, years) Frequency: how often pain is an issue (e.g., several times per day, once every few days, constant) FUNCTIONAL ASSESSMENT:  
 
Palliative Performance Scale (PPS):20 
 
ECOG 
ECOG Status : Completely disabled PSYCHOSOCIAL/SPIRITUAL SCREENING:  
  
Any spiritual / Mandaeism concerns: unable to assess 
[] Yes /  [] No 
 
Caregiver Burnout: 
[] Yes /  [] No /  [x] No Caregiver Present Anticipatory grief assessment: unable to assess 
[] Normal  / [] Maladaptive REVIEW OF SYSTEMS:  
 
Positive and pertinent negative findings in ROS are noted above in HPI. The following systems were [x] reviewed / [] unable to be reviewed as noted in HPI Other findings are noted below. Systems: constitutional, ears/nose/mouth/throat, respiratory, gastrointestinal, genitourinary, musculoskeletal, integumentary, neurologic, psychiatric, endocrine. Positive findings noted below. Modified ESAS Completed by: provider Pain: 3 Dyspnea: 0 Stool Occurrence(s): 1 PHYSICAL EXAM:  
 
Wt Readings from Last 3 Encounters:  
08/12/20 78.9 kg (174 lb)  
01/26/17 82.6 kg (182 lb)  
08/25/16 83 kg (183 lb) Blood pressure 121/57, pulse 67, temperature 98.7 °F (37.1 °C), resp. rate 20, height 5' 6\" (1.676 m), weight 78.9 kg (174 lb), SpO2 96 %. Pain: 
Pain Scale 1: Adult Nonverbal Pain Scale Pain Intensity 1: 0 Pain Description 1: Other (comment) Pain Intervention(s) 1: Medication (see MAR) Last bowel movement: x 1 yesterday Constitutional:restless, this afternoon, not in distress Respiratory: breathing not labored, she pulled nasal cannula off, does not appear in any distress will leave off for now HISTORY:  
 
Principal Problem: 
  Pneumonia due to 2019 novel coronavirus (7/26/2020) Active Problems: 
  NSTEMI (non-ST elevated myocardial infarction) ([facility] [address] (7/20/2020) PNA (pneumonia) (7/21/2020) Goals of care, counseling/discussion () COVID-19 () Past Medical History:  
Diagnosis Date  Essential hypertension, benign  Mitral valve disorders(424.0) Moderate MR  
 Other and unspecified hyperlipidemia  Other specified cardiac dysrhythmias(427.89) Non-sustained VT on Holter in past  
 Type II or unspecified type diabetes mellitus without mention of complication, not stated as uncontrolled History reviewed. No pertinent surgical history. Family History Problem Relation Age of Onset  Heart Disease Other Positive family history of ischemic heart disease. History reviewed, no pertinent family history. Social History Tobacco Use  Smoking status: Never Smoker  Smokeless tobacco: Never Used Substance Use Topics  Alcohol use: No  
 
Allergies Allergen Reactions  Minoxidil Other (comments) edema Current Facility-Administered Medications Medication Dose Route Frequency  LORazepam (INTENSOL) 2 mg/mL oral concentrate 0.5 mg  0.5 mg SubLINGual TID  ondansetron (ZOFRAN) injection 4 mg  4 mg IntraVENous Q4H PRN  
 LORazepam (INTENSOL) 2 mg/mL oral concentrate 0.5 mg  0.5 mg SubLINGual Q4H PRN  
 acetaminophen (TYLENOL) suppository 650 mg  650 mg Rectal Q4H PRN  
 bisacodyL (DULCOLAX) suppository 10 mg  10 mg Rectal DAILY PRN  
 morphine (ROXANOL) concentrated oral syringe 2.6 mg  2.6 mg SubLINGual Q4H PRN  
 
 
 LAB AND IMAGING FINDINGS:  
 
Lab Results Component Value Date/Time WBC 8.3 08/04/2020 02:28 AM  
 HGB 6.1 (L) 08/04/2020 02:28 AM  
 PLATELET 292 (L) 28/30/6414 02:28 AM  
 
Lab Results Component Value Date/Time Sodium 153 (H) 08/04/2020 02:28 AM  
 Potassium 3.7 08/04/2020 02:28 AM  
 Chloride 125 (H) 08/04/2020 02:28 AM  
 CO2 18 (L) 08/04/2020 02:28 AM  
 BUN 26 (H) 08/04/2020 02:28 AM  
 Creatinine 1.69 (H) 08/04/2020 02:28 AM  
 Calcium 7.2 (L) 08/04/2020 02:28 AM  
 Magnesium 2.1 07/23/2020 05:29 AM  
  
Lab Results Component Value Date/Time Alk. phosphatase 64 07/26/2020 04:31 AM  
 Protein, total 6.7 07/26/2020 04:31 AM  
 Albumin 2.6 (L) 07/26/2020 04:31 AM  
 Globulin 4.1 (H) 07/26/2020 04:31 AM  
 
Lab Results Component Value Date/Time INR 1.4 (H) 07/23/2020 05:29 AM  
 Prothrombin time 16.8 (H) 07/23/2020 05:29 AM  
 aPTT 27.5 07/28/2020 03:14 AM  
  
Lab Results Component Value Date/Time Ferritin 223 08/04/2020 02:28 AM  
  
No results found for: PH, PCO2, PO2 No components found for: [place] Lab Results Component Value Date/Time  07/21/2020 02:03 AM  
 CK - MB 4.6 (H) 07/19/2020 08:10 PM  
  
 
   
 
Total time: 15 minutes Counseling / coordination time, spent as noted above: 10 minutes 
> 50% counseling / coordination: Yes, with RN Prolonged service was provided for  []30 min   []75 min in face to face time in the presence of the patient, spent as noted above. Time Start: Time End:  
Note: this can only be billed with 33369 (initial) or 27572 (follow up). If multiple start / stop times, list each separately.

## 2020-08-13 NOTE — ROUTINE PROCESS
Bedside and Verbal shift change report given to [address] (oncoming nurse) by Santos Ling RN (offgoing nurse). Report included the following information SBAR, Kardex, Intake/Output, MAR, Recent Results and Med Rec Status. Patient resting in bed. Nasal cannula in place.

## 2020-08-13 NOTE — ROUTINE PROCESS
Bedside and Verbal shift change report given to Jose IZAGUIRRE (oncoming nurse) by Robert Lambert (offgoing nurse). Report included the following information SBAR, Kardex, Intake/Output, MAR, Recent Results and Med Rec Status. Patient resting in bed. Nasal cannula in place.

## 2020-08-13 NOTE — PROGRESS NOTES
[facility] Hospitalist Group Progress Note Patient: Selam Zhong Age: 80 y.o. : 2/15/1929 MR#: [id number] SSN: [id number] Date: 2020 Subjective/24-hour events: No new issues. Non-verbal. Calmer today. Assessment:  
COVID-19 infection with COVID-19 pneumonia Acute hypoxic respiratory failure secondary to above Acute metabolic encephalopathy Hypernatremia 2/2 volume depeletion NSTEMI Acute anemia concern for GI blood loss - occult blood positive this hospitalization ZION on CKD 3, improved DM2 with hyperglycemia Hypertension Abnormal cognition, suspect underlying dementia Severe protein calorie malnutrition Advanced age Plan: 
Continue comfort measures as ordered. Disposition issue:  Patient without LTC payor source and family unable to care for patient at home. CM working on placement in setting of aforementioned obstacles. Case discussed with:  [x]Patient  []Family  [x]Nursing  [x]Case Management DVT Prophylaxis:  []Lovenox  []Hep SQ  []SCDs  []Coumadin   []On Heparin gtt Objective:  
VS:  
Visit Vitals /57 (BP 1 Location: Right arm, BP Patient Position: At rest) Pulse 67 Temp 98.7 °F (37.1 °C) Resp 20 Ht 5' 6\" (1.676 m) Wt 78.9 kg (174 lb) SpO2 96% BMI 28.08 kg/m² Tmax/24hrs: Temp (24hrs), Av.6 °F (37 °C), Min:98 °F (36.7 °C), Max:99 °F (37.2 °C) No intake or output data in the 24 hours ending 20 1215 General: Nontoxic appearing. Cardiovascular: RRR Pulmonary: Accessory muscle use. GI:  Abdomen soft, nontender. Extremities: Warm, no edema or ischemia. Neuro: Awake and alert but confused. Labs:   
No results found for this or any previous visit (from the past 24 hour(s)). Signed By: Mayra Jara MD   
 2020

## 2020-08-14 NOTE — PROGRESS NOTES
[facility] Hospitalist Group Progress Note Patient: Ki Aguirre Age: 80 y.o. : 2/15/1929 MR#: [id number] SSN: [id number] Date: 2020 Subjective/24-hour events:  
 
Face serene. Breathing non labored. COVID test ordered per palli care conversation w/ family. Assessment:  
COVID-19 infection with COVID-19 pneumonia Acute hypoxic respiratory failure secondary to above Acute metabolic encephalopathy Hypernatremia 2/2 volume depeletion NSTEMI Acute anemia concern for GI blood loss - occult blood positive this hospitalization ZION on CKD 3, improved DM2 with hyperglycemia Hypertension Abnormal cognition, suspect underlying dementia Severe protein calorie malnutrition Advanced age Plan: 
Continue comfort measures as ordered. Disposition issue:  Patient without LTC payor source and family unable to care for patient at home. CM working on placement in setting of aforementioned obstacles. Case discussed with:  [x]Patient  []Family  [x]Nursing  [x]Case Management DVT Prophylaxis:  []Lovenox  []Hep SQ  []SCDs  []Coumadin   []On Heparin gtt Objective:  
VS:  
Visit Vitals /56 (BP 1 Location: Left arm, BP Patient Position: At rest) Pulse 72 Temp 97.7 °F (36.5 °C) Resp 18 Ht 5' 6\" (1.676 m) Wt 78.9 kg (174 lb) SpO2 97% BMI 28.08 kg/m² Tmax/24hrs: Temp (24hrs), Av.7 °F (36.5 °C), Min:97.6 °F (36.4 °C), Max:97.7 °F (36.5 °C) No intake or output data in the 24 hours ending 20 1449 General: face serene. Breathing non labored, not tachypneic. Cardiovascular: RRR Pulmonary: cta b.l anterior and infraaxillary fields. GI:  Abdomen soft, nontender. Extremities: Warm, no edema or ischemia. Neuro: resting comfortably. Labs:   
No results found for this or any previous visit (from the past 24 hour(s)). Signed By: Consuelo Hernandez MD   
 2020

## 2020-08-14 NOTE — PROGRESS NOTES
Family continues to be uncooperative in discussing discharge with CM. APS called and report given to Ms. Alexander. Romelia Khanna, MSW, [place]- [phone]

## 2020-08-14 NOTE — HOSPICE
Received call from  that patient was ready for discharge. Workup sent to Dr. Annette Bright for admitting diagnosis. Call placed to daughter Ami Boss at [phone], received , left message to please return my call.

## 2020-08-14 NOTE — ROUTINE PROCESS
Bedside and verbal report received from [address] (offgoing nurse) by Leroy Cook RN (on coming nurse. Report included the following information; SBAR, MAR, LABS, Intake/output, Kardex, and summary of care. Patient resting in bed incontinence. Patient bathed. Will continue to monitor.

## 2020-08-14 NOTE — PROGRESS NOTES
CM called pt's daughter Paras Torres, granddaughter Jerome Yeh answered and placed CM on speaker phone. CM explained that she was calling to work with them to figure out a discharge plan. CM asked if they had looked into whether or not pt qualifies for medicaid. They stated that they have not. CM asked if they knew how much income pt has a month, they did not know that answer. CM informed them that the hospital could have someone reach out to them to assess whether or not pt would qualify for medicaid. Daughter Paras Torres stated \" She can call sometime next week. It'll take me some time to get that information. \" CM asked if pt was service connected, daughter reports that she is not. CM explained that if pt would qualify for Medicaid then we could look for longterm care facilities that would accept COVID + pts. Paras Torres asked where they were. CM explained that they were in [facility] and [place]. Paras Torres responded abruptly \"Oh no! That's my mom. She's not going that far away! \" CM informed them that if they could get her financial information we'll see if she qualifies for medicaid and go from there. Kip Tanner RN BSN Care Manager [phone]

## 2020-08-14 NOTE — PROGRESS NOTES
Problem: Falls - Risk of 
Goal: *Absence of Falls Description: Document [place] Bobby Fall Risk and appropriate interventions in the flowsheet. Note: Fall Risk Interventions: 
Mobility Interventions: Bed/chair exit alarm, Communicate number of staff needed for ambulation/transfer Mentation Interventions: Adequate sleep, hydration, pain control, Room close to nurse's station Medication Interventions: Bed/chair exit alarm, Evaluate medications/consider consulting pharmacy Elimination Interventions: Call light in reach, Bed/chair exit alarm

## 2020-08-14 NOTE — ROUTINE PROCESS
Bedside and Verbal shift change report given to Summer Peres (oncoming nurse) by Luzmaria Singh RN (offgoing nurse). Report included the following information SBAR, Kardex, Intake/Output, MAR and Recent Results.

## 2020-08-14 NOTE — PROGRESS NOTES
Patient unable to understand and/or complete the \"Important Message from [address] [facility]". Reviewed document with patient's representative Shawna Evans  at phone number [phone] telephonically and addressed questions. A copy of the IMM letter left at bedside with patient. Original, with verbal signature noted, placed in patient's chart. Family appealing discharge. Detailed Notice of Discharge provided by phone to pt's daughter Shawna Evans. CYRIL Rowe, [facility]- [phone]

## 2020-08-14 NOTE — PROGRESS NOTES
Verbal shift change report given to Tony Kaiser RN (oncoming nurse) by Autumn Parekh RN (offgoing nurse). Report included the following information SBAR, Kardex, ED Summary, Procedure Summary, Intake/Output, MAR and Recent Results and plan of care.

## 2020-08-14 NOTE — PROGRESS NOTES
Received [address] [place] [place] [address] Appeal letter, scanned Appeal letter, detailed notice of discharge and important message in chart, contacted Michael Malik in HIM to fax appeal information to [address] [place] [place] [address] at [phone]. Demetrius Matos ([place]) information has been scanned for faxing.

## 2020-08-14 NOTE — CONSULTS
Palliative Medicine Consult [facility]: [phone]) [facility]: [phone] [facility]: [phone] Patient Name: Gene Jones YOB: 1929 Date of Initial Consult: 7/23/2020, follow up 7/24/2020, 7/27/2020, 7/28/2020, 7/30/2020, 7/31/2020, 8/3/2020, 8/4/2020, 8/6/2020, 8/12/2020, 8/13/2020, 8/14/2020 Reason for Consult: Care decisions Requesting Provider: Mattie Harmon MD 
Primary Care Physician: Brant Emerson MD 
  
 SUMMARY:  
Gene Jones is a 80y.o. year old female with a past history of HTN, MV disorder, hyperlipidemia, and DM type 2 who was admitted on 7/19/2020 from home with a diagnosis of NSTEMI and COVID-19 Pneumonia. Current medical issues leading to Palliative Medicine involvement include: Care decisions. 8/14/2020 appears comfortable when we saw this am, did not verbally respond to us. She remains on comfort measures, DNR/DNI comfort measures no feeding tubes. 8/13/2020 lying in bed gown pulled off. Tried to feed her ice cream but she did not take. Remains on comfort measures, DNR/DNI, no feeding tubes. 8/12/2020 resting comfortably this am, did not really respond to her name. Appears comfortable. Updated family. 8/6/2020: To preserve PPE, patient was seen from hallway. Patient seems to be awake, moving upper extremities. Seems to be in NAD. Continues on comfort measures. 8/4/2020 To preserve PPE, patient was seen on the video camera and spoke with the bedside RN. Patient seems to be awake, moving her upper extremities spontaneously,  
moving her head from side to side spontaneously. The bedside RN said the patient remains confused and moaning at times; remains on high flow O2 at 5 liters; temperature is 94°F. H&H 6.1 g/dL/19.5% on 8/3/2020. DNR/DNI, comfort measures, no tube feedings, no blood transfusion.  
 
8/3/2020 To preserve PPE, update on patient's condition was obtained from the bedside RN. She said patient is not eating, confused, yelling at times, with soft wrist restraints in place on both hands. DNR/DNI with tube feedings 2020 calm at time we saw. Spoke with nurse agitated last night with yelling out. Spoke with family including daughter Edna Mallory and grand daughters. No decision on comfort measures. DNR/DNI  
 
2020 yelling out at times. Confused. Poor po intake 2020 agitated at times, calling for sister who is . Long conversation with daughter Edna Mallory, granddaughter Eduardo Kiser. Discussed comfort option. They are discussing. 2020 seen this am around 1100. High flow donned. Alert eyes open. Spoke with daughter Jose Martin and son Linda Mansfield via phone. DNR/DNI  
 
2020 comfortable appearing. Calm, in NAD  
 PALLIATIVE DIAGNOSES:  
1.Goals of care 2. Non-ST elevation MI 
3. Multifocal pneumonia 4. COVID-19 infection PLAN:  
2020 follow up with patient this am along with Ms Lakeisha Morse. Seen in full PPE. She did not open her eyes to her name of light touch. Manas Jimenez appears very comfortable this am. Continues to have poor prognosis. Updated her grand daughter Eduardo Kiser, many questions again today. We offered support understanding this is a difficult time for this family. CM has contacted them, d/c options in progress. No change in care decisions DNR/DNI comfort measures, no feeding tubes. ( please see below for previous conversations) 2020 seen in full PPE this afternoon, gown pulled off, able to re dress, appears to be in pain , discussed with RN she will medicate. Attempted to feed her ice cream, she would not take. Per RN did not take breakfast for her. Briefs remaining dry. She continues to decline as expected, family is aware. Goals of care DNR/DNI comfort measures no feeding tubes. 2020 follow up on Ms Kassi Conway who remains on comfort measures.  She did not alert to her name. Appears comfortable, face serene. CNA at bedside. Continues to have poor po. Changed diet to full liquids to see if this will help with some intake. Updated family daughter Ms Rosalind Oglesby and grand daughter Krupa Berger. Shared with them continued poor po and change to full liquids. Her poor intake is expected as she continues to decline and this was shared with family. Focus is on comfort and feeds that she may find pleasurable. Family voiced their understanding. Offered support to family. Answered all questions to the best of my ability. Goals of care DNR/DNI comfort measures, no feeding tubes. 8/6/2020: To preserve PPE, patient was seen from hallway. Patient seems to be awake, moving upper extremities. Seems to be in NAD. Continues on comfort measures. Remains COVID +. Support to family as they cannot come to visit due to visitor restrictions in place in response to COVID-19. Goals of care unchanged. Continue DNR/DNI, comfort measures, NO feeding tubes. Signed POST now on file. Hospice and CM working with family to determine discharge planning, appreciate assistance. Will continue to follow for support and symptom management. 8/4/2020 Follow up: To preserve PPE, patient was seen via the video camera and spoke with the bedside RN. Patient seems to be awake, moving her upper extremities spontaneously, moving her head from side to side spontaneously. The bedside RN said the patient remains confused and moaning at times; remains on high flow O2 at 5 liters; temperature is 94°F. H&H 6.1 g/dL/19.5% on 8/3/2020. Spoke with Krupa Berger, granddaughter and Elvia Tony, daughter of patient via the telephone today. They were updated on patient's condition. Reassurance and emotional support given to family. Discussed and explained the goals, benefits and burdens of comfort care. POST form was discussed; that Ms. Caryl Choudhury, daughter of patient needs to sign it. The form will be emailed to [facility], granddaughter and she will forward it to her mother for signature via docu-sign, then she will e-mail it back to us. I confirmed with the family that NO blood transfusion will be given. Dr. Ab Sanches was made aware that family said NO to the blood transfusion; that they want patient to be on comfort care. He was also made aware that family is requesting for a repeat COVID-19 testing. Comfort care orders and IP Hospice referral were placed. BSRN and the  were notified of comfort care order. GOC: DNR/DNI, COMFORT CARE, no blood transfusion, no tube feedings. 8/3/2020 Follow up: To preserve PPE, patient's current condition was discussed with bedside RN. She said patient remains confused, not eating, yelling at times, with soft wrist restraints in place on both hands. Patient's family Temitope Obando, granddaughter and Carine Pereyra, daughter) called to let us know of their decision about tube feeding. They want to start  feeding her via the naso-gastric tube temporarily. They are hoping that patient's confusion will clear up if she is able to get nutrition. They said they had a video  call yesterday and heard patient yelling and they thought that patient is responding to them. The benefits and burdens of tube feeding were discussed with the family. Information on tube feeding will be emailed to the family by POOJA Coates RN. Dr. Almita Cross was made aware of the family's decision on tube feeding. DNR/DNI with tube feedings. 7/31/2020  Follow up on Ms Li Larios. She is lying in bed quiet this AM. Discussed with nurse agitated last night received ativan. Long conversation with daughter Carine Pereyra grand daughters Susi Folds and Colon Alley. Clinical update given, including unfortunately despite best treatment her mentation is not improving nor is her appetite.  Discussed aggressive care moving forward, nutrition will need to be addressed including feeding tube placement. We shared the burdens of these efforts at some length with family. Comfort measures discussed at some length with family. We asked family to consider what Sudhir Munoz would want and tell them to do if she were able and understanding her illness and how this affects her quality of life. Family did share they would not be able to care for patient at home as there is limited family available. No care decisions were made today. Goals of care DNR/DNI limited inventions, feeding tube to be decided. Further care decisions to be decided on by family. Family asked for time to re discuss with all family members. 2020 follow up this am. To preserve PPE viewed through monitor as she is COVID  19+ . Discussed with RN. Remains agitated at times. Yelling out. MAR reviewed Ativan 0.5 at 0100 this am, received x 2 yesterday. Despite best treatment she continues to be confused agitated not eating. Appreciate conversations by attending with oscar Durán. She indicated to him she would like comfort measures here in hospital, but would be unable to care for her mother at home. Spoke with grand daughter Estela Sneed ( at [facility] request I call her) Reviewed medical condition at some detail. During conversation attending spoke with patient's daughter Joseph Durán who indicated she does not wish for her mother to suffer and wishes to embark on a more  comfort directed approach. Discussed with Estela Sneed who asked we continue current level of care so she can speak with her mother to ensure she completley understood conversation. Message left with attending on above. Comfort measures discussed again in detail with Estela Naren. Goals of care DNR/DNI  
 
 
 
.2020 follow up along with Ms Elaine Quinn RN.  Agitated, calling for her  sister Latoya Watson, \" take my hand [place]\" Family was able to participate in video ( daughter Azalia Henderson, grand daughter Estela Sneed, and other family members) They were visibly upset and could be patient's distress. After video participated in family conference via phone with the above attendees. Updated on overall condition. Honest but compassionate discussion. Unfortunately despite best treatment and efforts she is declining, not eating, confused agitated. All likely due to the effects of COVD 19 and MI. All questions answered to the best of our ability. Discussed moving to comfort measures, symptom management, vs continuing current course. Hospice at home also discussed. Difficult discussions with family. We offered support in this difficult time. Shared with family unfortunately Ms Gutierrez's prognosis is poor and although difficult to predict her time maybe short. Family wishes to consider all options. Currently no change in care decisions, continue treatment. Goals of care DNR/DNI limited interventions. Continue current level of care. Attending updated on conversation. 7/27/2020 follow up on Ms Ramona Prather. Noted on requiring high flow oxygen. Seen through window due to COVID 19 isolation. At time of our visit around 1100 she appeared calm, eyes open moving in bed. Discussed with BSRN no issues at that time. Poor po. Spoke with daughter Thalia Lyn, and son Royce Castro via phone. Updated on clinical condition as of 1100. Re addressed goals of care discussed with both Sara and Jimmy DNR/DNI. Ms Diallo Ocampo and Ms Jay Butler NP were witnesses to conversation. Plan on video conference with family at 9:30 tomorrow. We also shared with daughter, with advanced age and COVID 23 clinical condition can be very fluid, meaning her condition can change rapidly. Family is very hopeful and prayerful she will recover. Goals of care DNR/DNI continue current medical treatment. Please note at time of our call to the family not aware of code stroke being called. Attending and BSRN aware of code change. 7/24/2020 follow up along with Ms Katy Leggett RN.  To preserve PPE patient evaluated via video monitor. She is calm. Spoke with RN calm for the most part. She remains confused. Spoke with another [place] daughter Natanael Dickson. Ms Mike Gómez also shared the call with her friend Juana eFrn who is palliative RN. We again addressed goals of care burdens of resuscitation, with advanced age  And the effect on her quality of life. All very understanding of information and they are continuing discussions which we highly encouraged. We have also shared with the family the possibility she may not return to her baseline level of functioning or cognition. Goals of care full code with full interventions. Goals of care: 
    Saw patient today, she is awake, alert, moving all extremities spontaneously and to purpose. She is trying to remove medical equipments and monitors which are attached to her. She appears to be confused. She is not in any distress. Called Melissa Keith, son of patient when unable to contact daughter, Kenan Verma. Melissa Keith said to talk to his sister, Kenan Verma about patient. We were able to talk to Kenan Verma and patient's granddaughter today. They said that patient is very active and independent with her ADL prior to this admission. They denied noticing confusion or any other mental issues with patient. They said that patient is mentally alert and very capable of deciding for herself prior to admission. They also said patient is able to walk and goes to the grocery store. Granddaughter said patient and her family has not had any discussion about AMD and goals of care. Discussed the benefits and burdens of goals of care with the granddaughter and with Ms. Kenan Verma. Social: has multiple recent deaths in the family ( son, nephew); one of her son is sick and in the hospital, per granddaughter. 2. Non-ST elevation MI: 
    Patient is on telemetry monitor and on IV Heparin 3. Multifocal pneumonia:  
    Patient is on O2 at 4 liters via NC with O2 saturation above 90%.  She has no shortness of breath. 4. COVID-19 infection: 
    Rapid COVID-19 test on 7/21/2020 was detected. TREATMENT PREFERENCES:  
Code Status: DNR/DNI Advance Care Planning: 
[] The [facility] Interdisciplinary Team has updated the ACP Navigator with [address] and Patient Capacity Primary Decision Maker (Legal next of kin): Has no MPOA. Pt does not have an Advance Directive on file in EMR and is not able to complete one currently. Legal Next of Kin would remain as the medical decision maker at this time since Ms. Ramona Prather is confused. Pt is a  and has two living adult children. Health care decision making will fall to a consensus of Thalia Lyn (daughter) and Margaret Gutierrez(son) Call placed to Margaret Jimenez, he requested we contact his sister Rosa Saucedo she is the caregiver and makes all the decisions. Daquan Guardado, daughter - home number: [phone], mobile number: [phone], e-mail: [email or web address]. Medical Interventions: Comfort measures Artificially Administered Nutrition: No feeding tube Other: As far as possible, the palliative care team has discussed with patient / health care proxy about goals of care / treatment preferences for patient. HISTORY:  
 
History obtained from: Chart, Daughter, Granddaughter CHIEF COMPLAINT: NSTEMI, COVID - 19 infection HPI/SUBJECTIVE: The patient is:  
[] Verbal and participatory [x] Non-participatory due to: confusion Clinical Pain Assessment (nonverbal scale for nonverbal patients):  
Patient moans at times per bedside RN Activity (Movement): Lying quietly, normal position Duration: for how long has pt been experiencing pain (e.g., 2 days, 1 month, years) Frequency: how often pain is an issue (e.g., several times per day, once every few days, constant) FUNCTIONAL ASSESSMENT:  
 
Palliative Performance Scale (PPS):20 
 
ECOG 
ECOG Status : Completely disabled  PSYCHOSOCIAL/SPIRITUAL SCREENING:  
  
 Any spiritual / Latter-day concerns: unable to assess 
[] Yes /  [] No 
 
Caregiver Burnout: 
[] Yes /  [] No /  [x] No Caregiver Present Anticipatory grief assessment: unable to assess 
[] Normal  / [] Maladaptive REVIEW OF SYSTEMS:  
 
Positive and pertinent negative findings in ROS are noted above in HPI. The following systems were [x] reviewed / [] unable to be reviewed as noted in HPI Other findings are noted below. Systems: constitutional, ears/nose/mouth/throat, respiratory, gastrointestinal, genitourinary, musculoskeletal, integumentary, neurologic, psychiatric, endocrine. Positive findings noted below. Modified ESAS Completed by: provider Pain: 0 Dyspnea: 0 Stool Occurrence(s): 1 PHYSICAL EXAM:  
 
Wt Readings from Last 3 Encounters:  
08/12/20 78.9 kg (174 lb)  
01/26/17 82.6 kg (182 lb)  
08/25/16 83 kg (183 lb) Blood pressure 135/56, pulse 72, temperature 97.7 °F (36.5 °C), resp. rate 18, height 5' 6\" (1.676 m), weight 78.9 kg (174 lb), SpO2 97 %. Pain: 
Pain Scale 1: Adult Nonverbal Pain Scale Pain Intensity 1: 0 Pain Description 1: Other (comment) Pain Intervention(s) 1: Medication (see MAR) Last bowel movement: x 1 yesterday Constitutional: calm in bed did not respond verbally to her name or light touch, moving her arms, in NAD Respiratory: breathing not labored, nasal cannula in place no distress noted HISTORY:  
 
Principal Problem: 
  Pneumonia due to 2019 novel coronavirus (7/26/2020) Active Problems: 
  NSTEMI (non-ST elevated myocardial infarction) ([facility] [address] (7/20/2020) PNA (pneumonia) (7/21/2020) Goals of care, counseling/discussion () COVID-19 () Past Medical History:  
Diagnosis Date  Essential hypertension, benign  Mitral valve disorders(424.0) Moderate MR  
 Other and unspecified hyperlipidemia  Other specified cardiac dysrhythmias(427.89) Non-sustained VT on Holter in past  
 Type II or unspecified type diabetes mellitus without mention of complication, not stated as uncontrolled History reviewed. No pertinent surgical history. Family History Problem Relation Age of Onset  Heart Disease Other Positive family history of ischemic heart disease. History reviewed, no pertinent family history. Social History Tobacco Use  Smoking status: Never Smoker  Smokeless tobacco: Never Used Substance Use Topics  Alcohol use: No  
 
Allergies Allergen Reactions  Minoxidil Other (comments) edema Current Facility-Administered Medications Medication Dose Route Frequency  LORazepam (INTENSOL) 2 mg/mL oral concentrate 0.5 mg  0.5 mg SubLINGual TID  ondansetron (ZOFRAN) injection 4 mg  4 mg IntraVENous Q4H PRN  
 LORazepam (INTENSOL) 2 mg/mL oral concentrate 0.5 mg  0.5 mg SubLINGual Q4H PRN  
 acetaminophen (TYLENOL) suppository 650 mg  650 mg Rectal Q4H PRN  
 bisacodyL (DULCOLAX) suppository 10 mg  10 mg Rectal DAILY PRN  
 morphine (ROXANOL) concentrated oral syringe 2.6 mg  2.6 mg SubLINGual Q4H PRN  
 
 
 LAB AND IMAGING FINDINGS:  
 
Lab Results Component Value Date/Time WBC 8.3 08/04/2020 02:28 AM  
 HGB 6.1 (L) 08/04/2020 02:28 AM  
 PLATELET 258 (L) 36/39/0094 02:28 AM  
 
Lab Results Component Value Date/Time Sodium 153 (H) 08/04/2020 02:28 AM  
 Potassium 3.7 08/04/2020 02:28 AM  
 Chloride 125 (H) 08/04/2020 02:28 AM  
 CO2 18 (L) 08/04/2020 02:28 AM  
 BUN 26 (H) 08/04/2020 02:28 AM  
 Creatinine 1.69 (H) 08/04/2020 02:28 AM  
 Calcium 7.2 (L) 08/04/2020 02:28 AM  
 Magnesium 2.1 07/23/2020 05:29 AM  
  
Lab Results Component Value Date/Time Alk. phosphatase 64 07/26/2020 04:31 AM  
 Protein, total 6.7 07/26/2020 04:31 AM  
 Albumin 2.6 (L) 07/26/2020 04:31 AM  
 Globulin 4.1 (H) 07/26/2020 04:31 AM  
 
Lab Results Component Value Date/Time INR 1.4 (H) 07/23/2020 05:29 AM  
 Prothrombin time 16.8 (H) 07/23/2020 05:29 AM  
 aPTT 27.5 07/28/2020 03:14 AM  
  
Lab Results Component Value Date/Time Ferritin 223 08/04/2020 02:28 AM  
  
No results found for: PH, PCO2, PO2 No components found for: [place] Lab Results Component Value Date/Time  07/21/2020 02:03 AM  
 CK - MB 4.6 (H) 07/19/2020 08:10 PM  
  
 
   
 
Total time: 15 minutes Counseling / coordination time, spent as noted above: 10 minutes 
> 50% counseling / coordination: Yes, with RN Prolonged service was provided for  []30 min   []75 min in face to face time in the presence of the patient, spent as noted above. Time Start:  
Time End:  
Note: this can only be billed with 08735 (initial) or 12580 (follow up). If multiple start / stop times, list each separately.

## 2020-08-14 NOTE — PROGRESS NOTES
Palliative Medicine Consult DR. CONT[facility]: [phone] ([phone]) [facility]: [phone] 
  
Palliative Medicine follow up Francisco Liao NP, and this writer met with pt.  Ms. Lindsey Settler in bed in a relaxed state. Nonverbal and not opening eyes Pt was verbally reassured of her safety. No outward signs of discomfort noted. Slight cough noted this morning, fluids were not offered at this time. Assisted with oral and personal care to face provided. Remains on comfort measures.  
  
Comfort POST on file signed by patient's daughterLaron 
  
DNR/DNI Comfort measures. Remains on comfort measures.   
    
Lazaro HECKN, RN, [facility] Palliative Medicine Inpatient DR. PEL[facility]      
Palliative COPE Line: [phone] ([phone])

## 2020-08-14 NOTE — PROGRESS NOTES
Received call from pt's grand-dgt, Damian Vick. Upset that [facility] was called. Family has been uncooperative to discuss discharge plans for past week as per CM notes, palliative notes and hospice notes. Discussed all options again for discharge: home with hospice vs private pay at a facility that will accept covid positive pts. [employer] rep, Ban Evans, to call family now and discuss hospice services. CYRIL Reyes, [place]- [phone]

## 2020-08-15 NOTE — PROGRESS NOTES
[facility] Hospitalist Group Progress Note Patient: Ki Aguirre Age: 80 y.o. : 2/15/1929 MR#: [id number] SSN: [id number] Date: 8/15/2020 Subjective/24-hour events:  
 
COVID test ordered per palli care conversation w/ family, nsg to send today. Patient clinically no change. Assessment:  
COVID-19 infection with COVID-19 pneumonia Acute hypoxic respiratory failure secondary to above Acute metabolic encephalopathy Hypernatremia 2/2 volume depeletion NSTEMI Acute anemia concern for GI blood loss - occult blood positive this hospitalization ZION on CKD 3, improved DM2 with hyperglycemia Hypertension Abnormal cognition, suspect underlying dementia Severe protein calorie malnutrition Advanced age Plan: 
Continue comfort measures as ordered. Disposition issue:  Patient without LTC payor source and family unable to care for patient at home. CM working on placement in setting of aforementioned obstacles. Case discussed with:  [x]Patient  []Family  [x]Nursing  [x]Case Management DVT Prophylaxis:  []Lovenox  []Hep SQ  []SCDs  []Coumadin   []On Heparin gtt Objective:  
VS:  
Visit Vitals /63 (BP 1 Location: Left arm, BP Patient Position: At rest) Pulse 64 Temp 96.8 °F (36 °C) Resp 20 Ht 5' 6\" (1.676 m) Wt 78.9 kg (174 lb) SpO2 98% BMI 28.08 kg/m² Tmax/24hrs: Temp (24hrs), Av.2 °F (36.2 °C), Min:96.8 °F (36 °C), Max:98 °F (36.7 °C) No intake or output data in the 24 hours ending 08/15/20 1718 General: face serene. Breathing non labored, not tachypneic. Cardiovascular: RRR Pulmonary: cta b.l anterior and infraaxillary fields. GI:  Abdomen soft, nontender. Extremities: Warm, no edema or ischemia. Neuro: resting comfortably. Labs:   
No results found for this or any previous visit (from the past 24 hour(s)). Signed By: Cnosuelo Hernandez MD   
 August 15, 2020

## 2020-08-15 NOTE — ROUTINE PROCESS
Bedside and Verbal shift change report given to Jada Jay RN (oncoming nurse) by Baudilio Mantilla RN 
 (offgoing nurse). Report included the following information SBAR and Kardex.

## 2020-08-16 NOTE — PROGRESS NOTES
Verbal shift change report given to ZINA Hernandez (oncoming nurse) by Louis Gomez RN (offgoing nurse). Report included the following information SBAR, Kardex, ED Summary, Procedure Summary, Intake/Output, MAR, Recent Results and Cardiac Rhythm NSR, and plan of care.

## 2020-08-16 NOTE — PROGRESS NOTES
Problem: Falls - Risk of 
Goal: *Absence of Falls Description: Document Chsae Genao Fall Risk and appropriate interventions in the flowsheet. Note: Fall Risk Interventions: 
Mobility Interventions: Communicate number of staff needed for ambulation/transfer Mentation Interventions: Bed/chair exit alarm, Room close to nurse's station Medication Interventions: Bed/chair exit alarm, Evaluate medications/consider consulting pharmacy Elimination Interventions: Toileting schedule/hourly rounds

## 2020-08-16 NOTE — PROGRESS NOTES
Received message from [facility] that medical records had not been received. Call placed to [facility] at [phone]. Voicemail left stating that medical records were being re-faxed. Medical records faxed to [facility] at [phone] Holly Pruitt, MSN, RN, ACM-RN Care Management [phone]

## 2020-08-16 NOTE — PROGRESS NOTES
[facility] Hospitalist Group Progress Note Patient: Yahir Yost Age: 80 y.o. : 2/15/1929 MR#: [id number] SSN: [id number] Date: 2020 Subjective/24-hour events:  
 
Repeat COVID 8/15/2020 pending. Patient clinically no change. Assessment:  
COVID-19 infection with COVID-19 pneumonia Acute hypoxic respiratory failure secondary to above Acute metabolic encephalopathy Hypernatremia 2/2 volume depeletion NSTEMI Acute anemia concern for GI blood loss - occult blood positive this hospitalization ZION on CKD 3, improved DM2 with hyperglycemia Hypertension Abnormal cognition, suspect underlying dementia Severe protein calorie malnutrition Advanced age Plan: 
Continue comfort measures as ordered. Disposition issue:  Patient without LTC payor source and family unable to care for patient at home. CM working on placement in setting of aforementioned obstacles. Case discussed with:  [x]Patient  []Family  [x]Nursing  [x]Case Management DVT Prophylaxis:  []Lovenox  []Hep SQ  []SCDs  []Coumadin   []On Heparin gtt Objective:  
VS:  
Visit Vitals /66 (BP 1 Location: Left arm, BP Patient Position: At rest) Pulse 66 Temp 97.5 °F (36.4 °C) Resp 20 Ht 5' 6\" (1.676 m) Wt 76 kg (167 lb 8 oz) SpO2 (!) 88% BMI 27.04 kg/m² Tmax/24hrs: Temp (24hrs), Av.5 °F (36.4 °C), Min:97.5 °F (36.4 °C), Max:97.5 °F (36.4 °C) No intake or output data in the 24 hours ending 20 1617 General: face serene. Breathing non labored, not tachypneic. Cardiovascular: RRR Pulmonary: cta b.l anterior and infraaxillary fields. GI:  Abdomen soft, nontender. Extremities: Warm, no edema or ischemia. Neuro: resting comfortably. Labs:   
Recent Results (from the past 24 hour(s)) SARS-COV-2 Collection Time: 08/15/20  5:55 PM  
Result Value Ref Range SARS-CoV-2 PENDING Specimen source Nasopharyngeal    
 Specimen type NP Swab Signed By: Jose Antonio Mccullough MD   
 August 16, 2020

## 2020-08-16 NOTE — PROGRESS NOTES
conducted a Follow up consultation and Spiritual Assessment for Amairani Ruelas, who is a 80 y.o.,female. The  provided the following Interventions: 
Continued the relationship of care and support with patient's granddaughter. Listened empathically. Chart reviewed. The following outcomes were achieved: 
Patient's granddaughter expressed gratitude for 's visit. Assessment: 
There are no further spiritual or Evangelical issues which require Spiritual Care Services interventions at this time. Plan: 
Chaplains will continue to follow and will provide pastoral care on an as needed/requested basis.  recommends bedside caregivers page  on duty if patient shows signs of acute spiritual or emotional distress. [address] [place] [address] Spiritual Care  
[phone]

## 2020-08-16 NOTE — ROUTINE PROCESS
Bedside and Verbal shift change report given to Jaida Montgomery (oncoming nurse) by Deonte Stone RN (offgoing nurse). Report included the following information SBAR, Kardex, Intake/Output, MAR and Recent Results. Patient moving in bed and lying on the right side.

## 2020-08-16 NOTE — ROUTINE PROCESS
Bedside and verbal report received from Jaida Montgomery (offgoing nurse) by Rolo Person RN(oncoming nurse). Report included the following information; SBAR, MAR, LABS, Intake/output, Kardex, and summary of care. Patient resting quietly in bed.

## 2020-08-17 NOTE — PROGRESS NOTES
Patient unable to understand and/or complete the \"Important Message from [employer]". Reviewed document with patient's daughter Jonathon Delgado) at phone [phone] number telephonically and addressed questions. Mrs. Harsh Streeter states she has already been through the appeal process and doesn't want to appeal justed needed another day to prepare for mother coming home. She stated she didn't need [employer]'s number at this time. Original, with verbal signature noted, placed in patient's chart.

## 2020-08-17 NOTE — CONSULTS
[facility] [phone] ([phone] [facility]: [phone] [facility]: [phone] Patient Name: Bertram Dunham YOB: 1929 Date of Initial Consult: 7/23/2020, follow up 7/24/2020, 7/27/2020, 7/28/2020, 7/30/2020, 7/31/2020, 8/3/2020, 8/4/2020, 8/6/2020, 8/12/2020, 8/13/2020, 8/14/2020, 8/17/2020 Reason for Consult: Care decisions Requesting Provider: Марина Mishra MD 
Primary Care Physician: Derek Tran MD 
  
 SUMMARY:  
Bertram Dunham is a 80y.o. year old female with a past history of HTN, MV disorder, hyperlipidemia, and DM type 2 who was admitted on 7/19/2020 from home with a diagnosis of NSTEMI and COVID-19 Pneumonia. Current medical issues leading to Palliative Medicine involvement include: Care decisions. 8/17/2020 comfortable appearing, she did not open her eyes to her name or light touch. Remains with very poor oral intake, Noted plan for hospice at home with d/c 8/18. Patient remains on comfort measures, DNR/DNI 
 
8/14/2020 appears comfortable when we saw this am, did not verbally respond to us. She remains on comfort measures, DNR/DNI comfort measures no feeding tubes. 8/13/2020 lying in bed gown pulled off. Tried to feed her ice cream but she did not take. Remains on comfort measures, DNR/DNI, no feeding tubes. 8/12/2020 resting comfortably this am, did not really respond to her name. Appears comfortable. Updated family. 8/6/2020: To preserve PPE, patient was seen from hallway. Patient seems to be awake, moving upper extremities. Seems to be in NAD. Continues on comfort measures. 8/4/2020 To preserve PPE, patient was seen on the video camera and spoke with the bedside RN.  Patient seems to be awake, moving her upper extremities spontaneously,  
 moving her head from side to side spontaneously. The bedside RN said the patient remains confused and moaning at times; remains on high flow O2 at 5 liters; temperature is 94°F. H&H 6.1 g/dL/19.5% on 8/3/2020. DNR/DNI, comfort measures, no tube feedings, no blood transfusion. 8/3/2020 To preserve PPE, update on patient's condition was obtained from the bedside RN. She said patient is not eating, confused, yelling at times, with soft wrist restraints in place on both hands. DNR/DNI with tube feedings 2020 calm at time we saw. Spoke with nurse agitated last night with yelling out. Spoke with family including daughter Francois Bryant and grand daughters. No decision on comfort measures. DNR/DNI  
 
2020 yelling out at times. Confused. Poor po intake 2020 agitated at times, calling for sister who is . Long conversation with daughter Francois Bryant, granddaughter Chucky Boss. Discussed comfort option. They are discussing. 2020 seen this am around 1100. High flow donned. Alert eyes open. Spoke with daughter Liu Temple and son Maurice Borrego via phone. DNR/DNI  
 
2020 comfortable appearing. Calm, in NAD  
 PALLIATIVE DIAGNOSES:  
1.Goals of care 2. Non-ST elevation MI 
3. Multifocal pneumonia 4. COVID-19 infection PLAN:  
2020 follow up on Ms Sabi Hatch. She appeared comfortable this AM, did not respond to her name or light touch. Oral intake remains very poor. Goals of care and care decisions have not changed DNr/DNI comfort measures, no feeding tubes. Appreciate all CM efforts noted plans for home with hospice tomorrow at 1030. COVID 19 test from 8/15/2020 is negative. ( please see below for previous conversations) 8/14/2020 follow up with patient this am along with Ms Nadira Vernon. Seen in full PPE. She did not open her eyes to her name of light touch. Kelly Belcher appears very comfortable this am. Continues to have poor prognosis. Updated her grand daughter Krupa Barraganrs, many questions again today. We offered support understanding this is a difficult time for this family. CM has contacted them, d/c options in progress. No change in care decisions DNR/DNI comfort measures, no feeding tubes. 8/13/2020 seen in full PPE this afternoon, gown pulled off, able to re dress, appears to be in pain , discussed with RN she will medicate. Attempted to feed her ice cream, she would not take. Per RN did not take breakfast for her. Briefs remaining dry. She continues to decline as expected, family is aware. Goals of care DNR/DNI comfort measures no feeding tubes. 8/12/2020 follow up on Ms Arti Thakkar who remains on comfort measures. She did not alert to her name. Appears comfortable, face serene. CNA at bedside. Continues to have poor po. Changed diet to full liquids to see if this will help with some intake. Updated family daughter Ms Rosalind Oglesby and grand daughter Krupa Berger. Shared with them continued poor po and change to full liquids. Her poor intake is expected as she continues to decline and this was shared with family. Focus is on comfort and feeds that she may find pleasurable. Family voiced their understanding. Offered support to family. Answered all questions to the best of my ability. Goals of care DNR/DNI comfort measures, no feeding tubes. 8/6/2020: To preserve PPE, patient was seen from hallway. Patient seems to be awake, moving upper extremities. Seems to be in NAD. Continues on comfort measures. Remains COVID +. Support to family as they cannot come to visit due to visitor restrictions in place in response to COVID-19. Goals of care unchanged. Continue DNR/DNI, comfort measures, NO feeding tubes. Signed POST now on file. Hospice and CM working with family to determine discharge planning, appreciate assistance. Will continue to follow for support and symptom management. 8/4/2020 Follow up: To preserve PPE, patient was seen via the video camera and spoke with the bedside RN. Patient seems to be awake, moving her upper extremities spontaneously, moving her head from side to side spontaneously. The bedside RN said the patient remains confused and moaning at times; remains on high flow O2 at 5 liters; temperature is 94°F. H&H 6.1 g/dL/19.5% on 8/3/2020. Spoke with [facility], granddaughter and Bearl Crease, daughter of patient via the telephone today. They were updated on patient's condition. Reassurance and emotional support given to family. Discussed and explained the goals, benefits and burdens of comfort care. POST form was discussed; that Ms. Olivia Davis, daughter of patient needs to sign it. The form will be emailed to [facility], granddaughter and she will forward it to her mother for signature via docu-sign, then she will e-mail it back to us. I confirmed with the family that NO blood transfusion will be given. Dr. Alayna Elias was made aware that family said NO to the blood transfusion; that they want patient to be on comfort care. He was also made aware that family is requesting for a repeat COVID-19 testing. Comfort care orders and IP Hospice referral were placed. BSRN and the  were notified of comfort care order. GOC: DNR/DNI, COMFORT CARE, no blood transfusion, no tube feedings. 8/3/2020 Follow up: To preserve PPE, patient's current condition was discussed with bedside RN. She said patient remains confused, not eating, yelling at times, with soft wrist restraints in place on both hands. Patient's family Siobhan Rodriguez, granddaughter and Kirkland Gaucher, daughter) called to let us know of their decision about tube feeding. They want to start  feeding her via the naso-gastric tube temporarily. They are hoping that patient's confusion will clear up if she is able to get nutrition. They said they had a video  call yesterday and heard patient yelling and they thought that patient is responding to them. The benefits and burdens of tube feeding were discussed with the family. Information on tube feeding will be emailed to the family by POOJA Coyle RN. Dr. Patrice Cerna was made aware of the family's decision on tube feeding. DNR/DNI with tube feedings. 7/31/2020  Follow up on Ms Amilcar Sargent. She is lying in bed quiet this AM. Discussed with nurse agitated last night received ativan. Long conversation with daughter Kirkland Gaucher grand daughters Cedrick Lombardi and Gay Cogan. Clinical update given, including unfortunately despite best treatment her mentation is not improving nor is her appetite. Discussed aggressive care moving forward, nutrition will need to be addressed including feeding tube placement. We shared the burdens of these efforts at some length with family. Comfort measures discussed at some length with family. We asked family to consider what Shiva Lopez would want and tell them to do if she were able and understanding her illness and how this affects her quality of life. Family did share they would not be able to care for patient at home as there is limited family available. No care decisions were made today. Goals of care DNR/DNI limited inventions, feeding tube to be decided. Further care decisions to be decided on by family. Family asked for time to re discuss with all family members. 7/30/2020 follow up this am. To preserve PPE viewed through monitor as she is COVID  19+ . Discussed with RN. Remains agitated at times. Yelling out. MAR reviewed Ativan 0.5 at 0100 this am, received x 2 yesterday. Despite best treatment she continues to be confused agitated not eating. Appreciate conversations by attending with daughter Ronal Barnett. She indicated to him she would like comfort measures here in hospital, but would be unable to care for her mother at home. Spoke with grand daughter [facility] ( at [facility] request I call her) Reviewed medical condition at some detail. During conversation attending spoke with patient's daughter Ronal Barnett who indicated she does not wish for her mother to suffer and wishes to embark on a more  comfort directed approach. Discussed with [facility] who asked we continue current level of care so she can speak with her mother to ensure she completley understood conversation. Message left with attending on above. Comfort measures discussed again in detail with [facility]. Goals of care DNR/DNI .2020 follow up along with Ms Graham Bañuelos RN. Agitated, calling for her  sister Tatiana Re, \" take my hand Sakshi\" Family was able to participate in video ( daughter Bertrum Saint, grand daughter Mejia Davison, and other family members) They were visibly upset and could be patient's distress. After video participated in family conference via phone with the above attendees. Updated on overall condition. Honest but compassionate discussion. Unfortunately despite best treatment and efforts she is declining, not eating, confused agitated. All likely due to the effects of COVD 19 and MI. All questions answered to the best of our ability. Discussed moving to comfort measures, symptom management, vs continuing current course. Hospice at home also discussed. Difficult discussions with family. We offered support in this difficult time. Shared with family unfortunately Ms Gutierrez's prognosis is poor and although difficult to predict her time maybe short. Family wishes to consider all options. Currently no change in care decisions, continue treatment. Goals of care DNR/DNI limited interventions. Continue current level of care. Attending updated on conversation. 7/27/2020 follow up on Ms Yamini Tellez. Noted on requiring high flow oxygen. Seen through window due to COVID 19 isolation. At time of our visit around 1100 she appeared calm, eyes open moving in bed. Discussed with BSRN no issues at that time. Poor po. Spoke with daughter Flaco Ambriz, and son Ilan Nix via phone. Updated on clinical condition as of 1100. Re addressed goals of care discussed with both Sara and Jimmy DNR/DNI. Ms Roxana Mckee and Ms Alma Virgen NP were witnesses to conversation. Plan on video conference with family at 9:30 tomorrow. We also shared with daughter, with advanced age and COVID 23 clinical condition can be very fluid, meaning her condition can change rapidly. Family is very hopeful and prayerful she will recover. Goals of care DNR/DNI continue current medical treatment. Please note at time of our call to the family not aware of code stroke being called. Attending and BSRN aware of code change. 7/24/2020 follow up along with Ms Shaan Latham RN. To preserve PPE patient evaluated via video monitor. She is calm. Spoke with RN calm for the most part. She remains confused. Spoke with another [place] daughter Max Foote. Ms Juanjo Camacho also shared the call with her friend Duncan Mccarthy who is palliative RN. We again addressed goals of care burdens of resuscitation, with advanced age  And the effect on her quality of life. All very understanding of information and they are continuing discussions which we highly encouraged. We have also shared with the family the possibility she may not return to her baseline level of functioning or cognition. Goals of care full code with full interventions. Goals of care: Saw patient today, she is awake, alert, moving all extremities spontaneously and to purpose. She is trying to remove medical equipments and monitors which are attached to her. She appears to be confused. She is not in any distress. Called Melissa Keith, son of patient when unable to contact daughter, Kenan Verma. Melissa Keith said to talk to his sister, Kenan Verma about patient. We were able to talk to Kenan Verma and patient's granddaughter today. They said that patient is very active and independent with her ADL prior to this admission. They denied noticing confusion or any other mental issues with patient. They said that patient is mentally alert and very capable of deciding for herself prior to admission. They also said patient is able to walk and goes to the grocery store. Granddaughter said patient and her family has not had any discussion about AMD and goals of care. Discussed the benefits and burdens of goals of care with the granddaughter and with Ms. Kenan Verma. Social: has multiple recent deaths in the family ( son, nephew); one of her son is sick and in the hospital, per granddaughter. 2. Non-ST elevation MI: 
    Patient is on telemetry monitor and on IV Heparin 3. Multifocal pneumonia:  
    Patient is on O2 at 4 liters via NC with O2 saturation above 90%. She has no shortness of breath. 4. COVID-19 infection: 
    Rapid COVID-19 test on 7/21/2020 was detected. TREATMENT PREFERENCES:  
Code Status: DNR/DNI Advance Care Planning: 
[] The [facility] Interdisciplinary Team has updated the ACP Navigator with [address] and Patient Capacity Primary Decision Maker (Legal next of kin): Has no MPOA. Pt does not have an Advance Directive on file in EMR and is not able to complete one currently. Legal Next of Kin would remain as the medical decision maker at this time since Ms. Tanya Childs is confused. Pt is a  and has two living adult children. Health care decision making will fall to a consensus of Carlo Sanchez (daughter) and Destinee Gutierrez(son) Call placed to Destinee Weatherseleazar, he requested we contact his sister Beto Mae she is the caregiver and makes all the decisions. Darren Santamaria, daughter - home number: [phone], mobile number: [phone], e-mail: [email or web address]@"[email or web address]. Medical Interventions: Comfort measures Artificially Administered Nutrition: No feeding tube Other: As far as possible, the palliative care team has discussed with patient / health care proxy about goals of care / treatment preferences for patient. HISTORY:  
 
History obtained from: Chart, Daughter, Granddaughter CHIEF COMPLAINT: NSTEMI, COVID - 19 infection HPI/SUBJECTIVE: The patient is:  
[] Verbal and participatory [x] Non-participatory due to: confusion Clinical Pain Assessment (nonverbal scale for nonverbal patients):  
Patient moans at times per bedside RN Activity (Movement): Lying quietly, normal position Duration: for how long has pt been experiencing pain (e.g., 2 days, 1 month, years) Frequency: how often pain is an issue (e.g., several times per day, once every few days, constant) FUNCTIONAL ASSESSMENT:  
 
Palliative Performance Scale (PPS):20 
 
ECOG 
ECOG Status : Completely disabled PSYCHOSOCIAL/SPIRITUAL SCREENING:  
  
Any spiritual / Restorationism concerns: unable to assess 
[] Yes /  [] No 
 
Caregiver Burnout: 
[] Yes /  [] No /  [x] No Caregiver Present Anticipatory grief assessment: unable to assess 
[] Normal  / [] Maladaptive  REVIEW OF SYSTEMS:  
 
 Positive and pertinent negative findings in ROS are noted above in HPI. The following systems were [] reviewed / [x] unable to be reviewed as noted in HPI Other findings are noted below. Systems: constitutional, ears/nose/mouth/throat, respiratory, gastrointestinal, genitourinary, musculoskeletal, integumentary, neurologic, psychiatric, endocrine. Positive findings noted below. Modified ESAS Completed by: provider Pain: 0 Anorexia: 10 Dyspnea: 0 Stool Occurrence(s): 1 PHYSICAL EXAM:  
 
Wt Readings from Last 3 Encounters:  
08/17/20 75.7 kg (166 lb 12.8 oz) 01/26/17 82.6 kg (182 lb)  
08/25/16 83 kg (183 lb) Blood pressure 101/50, pulse 88, temperature 96.8 °F (36 °C), resp. rate 20, height 5' 6\" (1.676 m), weight 75.7 kg (166 lb 12.8 oz), SpO2 95 %. Pain: 
Pain Scale 1: Visual 
Pain Intensity 1: 0 Pain Onset 1: (sleeping) Pain Description 1: Other (comment) Pain Intervention(s) 1: Medication (see MAR)(schedule medication) Last bowel movement: x 1 8/13/2020 Constitutional: comfortable appearing, did not open her eyes to her name or light touch Respiratory: breathing not labored, nasal cannula in place no distress noted HISTORY:  
 
Principal Problem: 
  Pneumonia due to 2019 novel coronavirus (7/26/2020) Active Problems: 
  NSTEMI (non-ST elevated myocardial infarction) ([facility] [address] (7/20/2020) PNA (pneumonia) (7/21/2020) Goals of care, counseling/discussion () COVID-19 () Past Medical History:  
Diagnosis Date  Essential hypertension, benign  Mitral valve disorders(424.0) Moderate MR  
 Other and unspecified hyperlipidemia  Other specified cardiac dysrhythmias(427.89) Non-sustained VT on Holter in past  
 Type II or unspecified type diabetes mellitus without mention of complication, not stated as uncontrolled History reviewed. No pertinent surgical history. Family History Problem Relation Age of Onset  Heart Disease Other Positive family history of ischemic heart disease. History reviewed, no pertinent family history. Social History Tobacco Use  Smoking status: Never Smoker  Smokeless tobacco: Never Used Substance Use Topics  Alcohol use: No  
 
Allergies Allergen Reactions  Minoxidil Other (comments) edema Current Facility-Administered Medications Medication Dose Route Frequency  LORazepam (INTENSOL) 2 mg/mL oral concentrate 0.5 mg  0.5 mg SubLINGual TID  ondansetron (ZOFRAN) injection 4 mg  4 mg IntraVENous Q4H PRN  
 LORazepam (INTENSOL) 2 mg/mL oral concentrate 0.5 mg  0.5 mg SubLINGual Q4H PRN  
 acetaminophen (TYLENOL) suppository 650 mg  650 mg Rectal Q4H PRN  
 bisacodyL (DULCOLAX) suppository 10 mg  10 mg Rectal DAILY PRN  
 morphine (ROXANOL) concentrated oral syringe 2.6 mg  2.6 mg SubLINGual Q4H PRN  
 
 
 LAB AND IMAGING FINDINGS:  
 
Lab Results Component Value Date/Time WBC 8.3 08/04/2020 02:28 AM  
 HGB 6.1 (L) 08/04/2020 02:28 AM  
 PLATELET 074 (L) 82/25/3228 02:28 AM  
 
Lab Results Component Value Date/Time Sodium 153 (H) 08/04/2020 02:28 AM  
 Potassium 3.7 08/04/2020 02:28 AM  
 Chloride 125 (H) 08/04/2020 02:28 AM  
 CO2 18 (L) 08/04/2020 02:28 AM  
 BUN 26 (H) 08/04/2020 02:28 AM  
 Creatinine 1.69 (H) 08/04/2020 02:28 AM  
 Calcium 7.2 (L) 08/04/2020 02:28 AM  
 Magnesium 2.1 07/23/2020 05:29 AM  
  
Lab Results Component Value Date/Time Alk. phosphatase 64 07/26/2020 04:31 AM  
 Protein, total 6.7 07/26/2020 04:31 AM  
 Albumin 2.6 (L) 07/26/2020 04:31 AM  
 Globulin 4.1 (H) 07/26/2020 04:31 AM  
 
Lab Results Component Value Date/Time INR 1.4 (H) 07/23/2020 05:29 AM  
 Prothrombin time 16.8 (H) 07/23/2020 05:29 AM  
 aPTT 27.5 07/28/2020 03:14 AM  
  
Lab Results Component Value Date/Time Ferritin 223 08/04/2020 02:28 AM  
  
No results found for: PH, PCO2, PO2 No components found for: [place] Lab Results Component Value Date/Time  07/21/2020 02:03 AM  
 CK - MB 4.6 (H) 07/19/2020 08:10 PM  
  
 
   
 
Total time: 15 minutes Counseling / coordination time, spent as noted above: 10 minutes 
> 50% counseling / coordination: Yes, with RN Prolonged service was provided for  []30 min   []75 min in face to face time in the presence of the patient, spent as noted above. Time Start:  
Time End:  
Note: this can only be billed with 67279 (initial) or 31700 (follow up). If multiple start / stop times, list each separately.

## 2020-08-17 NOTE — HOSPICE
Spoke with family discussed [employer] philosophy, services, criteria, and IDT. Answered all questions. Gave  24/7 contact information. Daughter stated she is ready for mom to come home today, she will need a hospital bed, O2 and suction. Will order asap for discharge today. Tirso Hopson RN Hospice Liaison [facility] [address] [place], [address] 
[phone]

## 2020-08-17 NOTE — PROGRESS NOTES
Received call from harvinder Urrutia, who indicated that they want to use [facility] and requested clinicals be sent to agency. Clinicals and hospice order sent via [facility]. CYRIL Teixeira, [facility]- [phone]

## 2020-08-17 NOTE — PROGRESS NOTES
1412-YOVANA spoke with pt's daughter and granddaughter regarding switching to [facility]. CM informed them that they have been notified and will reach out to them to discuss admission. CM called [facility] and spoke with Keshav Dubose. CM changed transport time to tomorrow morning at 1030am. 
 
1512-YOVANA spoke with Philomena Rubinstein from [facility]. Home hospice referral has been received and accepted. She reports that the equipment has been ordered and the hospice nurse will be at the pt's home tomorrow morning for home hospice admisison. Yordy Mcdonald RN BSN Care Manager [phone]

## 2020-08-17 NOTE — PROGRESS NOTES
Informed that the family initiated appeal of discharge was declined. CM called and spoke with Rishabh Morris from hospice and the pt's daughter Azalia Henderson. Pt has been accepted to [facility] agency. Discussed with daughter Azalia Henderson and she agreeable to the transition plan today. Transport has been arranged through [employer] for 4pm. The medical equipment is to be delivered today by 3pm. Patient's discharge summary and home health  orders have been forwarded to [facility] agency. Updated bedside RN, to the transition plan. Discharge information has been documented on the AVS. Mendoza Delgado RN BSN Care Manager [phone]

## 2020-08-17 NOTE — PROGRESS NOTES
Palliative Medicine Consult [facility]: [phone] ([phone] [facility]: [phone] 
  
Palliative Medicine follow up for comfort Belen Valencia NP, and this writer. [employer] Adjutant. Brenda is lying in bed. Not responding to name or opening eyes. Pt has not been able to take in  food or fluids when attempts were made. On O2 at 2 liters. Extremities noted to be cooler then core body. Intensol 0.5mg oral concentrate given 3 x a day. Remains on comfort measures.  
  
Comfort POST on file signed by patient's daughter, Tylor Inman 
  
DNR/DNI Comfort measures. Remains on comfort measures.   
  
   
Doris Gleason BSN, RN, CCM Palliative Medicine [facility]      
Palliative [facility] Line: [phone] ([phone])

## 2020-08-17 NOTE — HOSPICE
Received call from patient's daughter and grand-daughter on speaker phone stating they have changed their plans, they want to go with [facility]. CM updated. Equipment cancelled.

## 2020-08-17 NOTE — PROGRESS NOTES
[facility] Hospitalist Group Progress Note Patient: Misty Anaya Age: 80 y.o. : 2/15/1929 MR#: [id number] SSN: [id number] Date: 2020 Subjective/24-hour events:  
 
Repeat COVID 8/15/2020 negative Patient clinically no change. Assessment:  
COVID-19 infection with COVID-19 pneumonia Acute hypoxic respiratory failure secondary to above Acute metabolic encephalopathy Hypernatremia 2/2 volume depeletion NSTEMI Acute anemia concern for GI blood loss - occult blood positive this hospitalization ZION on CKD 3, improved DM2 with hyperglycemia Hypertension Abnormal cognition, suspect underlying dementia Severe protein calorie malnutrition Advanced age Plan: 
Continue comfort measures as ordered. Disposition issue:  Patient without LTC payor source and family unable to care for patient at home. CM working on placement in setting of aforementioned obstacles. Per  note, anticipate discharge in a.m. to home with hospice Case discussed with:  [x]Patient  []Family  [x]Nursing  [x]Case Management DVT Prophylaxis:  []Lovenox  []Hep SQ  []SCDs  []Coumadin   []On Heparin gtt Objective:  
VS:  
Visit Vitals /50 (BP 1 Location: Left arm, BP Patient Position: At rest) Pulse 88 Temp 96.8 °F (36 °C) Resp 20 Ht 5' 6\" (1.676 m) Wt 75.7 kg (166 lb 12.8 oz) SpO2 95% BMI 26.92 kg/m² Tmax/24hrs: Temp (24hrs), Av.9 °F (36.1 °C), Min:96.8 °F (36 °C), Max:97 °F (36.1 °C) No intake or output data in the 24 hours ending 20 1421 General: face serene. Breathing non labored, not tachypneic. Cardiovascular: RRR Pulmonary: cta b.l anterior and infraaxillary fields. GI:  Abdomen soft, nontender. Extremities: Warm, no edema or ischemia. Neuro: resting comfortably. Labs:   
No results found for this or any previous visit (from the past 24 hour(s)). Signed By: Caitlyn Marks MD   
 2020

## 2020-08-18 NOTE — PROGRESS NOTES
Pt discharged home from unit via [facility]. Daughter Toya Goodell notified of patient leaving. Bag of belongings with pt.

## 2020-08-18 NOTE — DISCHARGE SUMMARY
Discharge Summary Patient: Becki Castellanos MRN: [id number]  CSN: [id number] YOB: 1929  Age: 80 y.o. Sex: female DOA: 7/19/2020 LOS:  LOS: 29 days   Discharge Date:   
 
Admission Diagnoses: [facility]STEM[facility] (non-ST [facility] [I21.4] Discharge Diagnoses:   
Non-ST elevation MI 
COVID-19 pneumonia Acute hypoxic respiratory failure Acute metabolic encephalopathy ZION on CKD 3 History of type 2 diabetes History of hypertension History of dementia Severe protein calorie malnutrition Failure to thrive Discharge Condition: Stable Consults: Cardiology, Infectious Disease, Neurology, Pulmonary/Critical Care and Palliative care PHYSICAL EXAM 
Visit Vitals /54 (BP 1 Location: Left arm, BP Patient Position: At rest) Pulse 70 Temp 96.8 °F (36 °C) Resp 15 Ht 5' 6\" (1.676 m) Wt 75.4 kg (166 lb 3.2 oz) SpO2 (!) 70% BMI 26.83 kg/m² General: Alert, cooperative, no acute distress HEENT: PERRLA, EOMI. Anicteric sclerae. Lungs:  CTA Bilaterally. No Wheezing/Rhonchi/Rales. Heart:  Regular rate and Rhythm. Abdomen: Soft, Non distended, Non tender. + Bowel sounds. Extremities: No edema/ cyanosis/ clubbing Psych:   Good insight. Not anxious or agitated. Neurologic:  AA oriented X 3. Moves all extremities. HPI: 
Patient is a 80 y. o.female who is being evaluated for NSTEMI.  
  
 Ms. Davis Price is a 80 with a hx of HTN, MV disorder, hyperlipidemia, and DM type 2 who presented to the ED by her daughter because she was sleeping too much. Most of the history is obtained from the patient's daughter as the patient is somewhat confused at the time of my evaluation. Per the patient's daughter the patient is generally not usually confused but feels she is not answering appropriately because she is not able to hear as well. The patient denies any shortness of breath cough chest pain fevers or chills. She has had 1 loose bowel movement. She denies any abdominal pain her appetite is okay. She notes that she was having pain in her left foot which has since improved. She states that she was on her way to her son's  in [place] when [place] family dropped her off at the emergency room. Hospital Course:  
80-year-old female admitted to the hospital secondary to NSTEMI, cardiology consulted. Given her advanced age and multiple medical problems cardiology recommended medical management. Patient was also noted to have COVID-19 infection with acute respiratory failure. Infectious disease consulted and patient was started on IV antibiotics, IV steroids, IV heparin with close monitoring of inflammatory markers. Given patient's extensive age and multiple medical problems palliative care was also consulted and reached out to family who recommended Full code. Patient developed acute metabolic encephalopathy secondary to COVID-19 infection and poor p.o. intake was also very poor. She underwent a swallow eval and failed. Her hypoxia continued to worsen secondary to the COVID-19 pneumonia. Palliative care was in touch with the family who now proceeded to make patient DNR with possible comfort measures. Hospice was consulted and patient is currently being discharged home with hospice care. Imaging: XR Results (most recent): 
Results from Hospital Encounter encounter on 20 XR CHEST PORT Narrative EXAM: CHEST PORTABLE CLINICAL HISTORY/INDICATION: compare with prior cxr, follow-up multifocal 
airspace disease concerning for pneumonia. COMPARISON: 7/19/2020. TECHNIQUE: Portable AP view was obtained. FINDINGS:  
 
LINES/DEVICES: None. HEART/MEDIASTINUM: Mildly enlarged cardiac silhouette, unchanged. LUNGS: Patchy airspace opacities in the bilateral mid to lower lungs, improved 
compared to prior study 7/19/2020, with mild residual remaining. No definite 
pleural effusion or pneumothorax. Lungs are hypoinflated. SOFT TISSUES: Unremarkable. BONES: Unremarkable for age. Impression IMPRESSION: 
1. Hypoinflation with improving bilateral opacities. Thank you for enabling us to participate in the care of this patient. CT Results (most recent): 
Results from [facility] Encounter encounter on 07/19/20 CT HEAD WO CONT Narrative CT of the head without contrast 
 
HISTORY:Code S. Altered level of consciousness. COMPARISON: None. TECHNIQUE: Helical axial scan to the head was performed from the skull base to 
the vertex without IV contrast administration. All CT scans at this facility performed using dose optimization techniques as 
appreciated to a performed exam, to include automated exposure control, 
adjustment of the mA and or KU according to patient size (including appropriate 
matching for site specific examination), or use of iterative reconstruction 
technique. FINDINGS: 
 
Motion artifact noted. Mild age-related global atrophy with mild ventricular 
prominence identified. There is normal gray-white matter differentiation. There 
is no evidence of acute intracranial hemorrhage, mass effect or midline shift 
identified. No hyperdense MCA sign. No skull fracture or extra axial fluid 
collections identified. Visualized sinuses and mastoid air cells appear 
unremarkable.   
  
 Impression IMPRESSION: 
 
 No acute intracranial hemorrhage or mass effect. Note: If clinical concern of acute stroke, CTA or MRI with diffusion weighted 
images is recommended for further evaluation. The wet read was provided to the floor nurse, Ms. Jerald Bustillos, for the ordering 
physician by me upon the initial interpretation at approximately 1431 hours, 
confirmation received. Thank you for your referral.  
 
 
07/19/20 ECHO ADULT FOLLOW-UP OR LIMITED 07/24/2020 7/24/2020 Narrative · LV: Normal cavity size and systolic function (ejection fraction normal). Moderately increased wall thickness. Estimated left ventricular ejection  
fraction is 60 - 65%. Visually measured ejection fraction. Wall motion:  
normal. 
   
  Signed by: Laura Miranda MD  
  
 
 
Procedures:  
None Discharge Medications:    
Current Discharge Medication List  
  
START taking these medications Details  
morphine (ROXANOL) 20 mg/mL concentrated oral syringe 0.13 mL by SubLINGual route every four (4) hours as needed for Pain or Shortness of Breath for up to 7 days. Max Daily Amount: 15.6 mg. 
Qty: 2 mL, Refills: 0 Associated Diagnoses: COVID-19  
  
acetaminophen (TYLENOL) 650 mg suppository Insert 1 Suppository into rectum every six (6) hours as needed for Fever. Qty: 10 Suppository, Refills: 1 LORazepam (INTENSOL) 2 mg/mL concentrated solution 0.25 mL by SubLINGual route every thirty (30) minutes as needed+R[25]C for Agitation, Anxiety or Shortness of Breath. Max Daily Amount: 24 mg. Qty: 5 mL, Refills: 0 Associated Diagnoses: COVID-19  
  
bisacodyL (DULCOLAX) 10 mg supp Insert 10 mg into rectum daily as needed for Constipation. Qty: 10 Each, Refills: 1 STOP taking these medications  
  
 doxazosin (CARDURA) 8 mg tablet Comments:  
Reason for Stopping:   
   
 indapamide (LOZOL) 1.25 mg tablet Comments:  
Reason for Stopping:   
   
 escitalopram oxalate (LEXAPRO) 10 mg tablet Comments:  
Reason for Stopping: losartan (COZAAR) 100 mg tablet Comments:  
Reason for Stopping:   
   
 hydrALAZINE (APRESOLINE) 50 mg tablet Comments:  
Reason for Stopping:   
   
 atorvastatin (LIPITOR) 40 mg tablet Comments:  
Reason for Stopping:   
   
 ergocalciferol (ERGOCALCIFEROL) 50,000 unit capsule Comments:  
Reason for Stopping:   
   
 atenolol (TENORMIN) 100 mg tablet Comments:  
Reason for Stopping:   
   
 insulin glargine (LANTUS SOLOSTAR) 100 unit/mL (3 mL) pen Comments:  
Reason for Stopping:   
   
 insulin aspart (NOVOLOG) 100 unit/mL injection Comments:  
Reason for Stopping:   
   
 aspirin 81 mg tablet Comments:  
Reason for Stopping:   
   
 sitaGLIPtin (JANUVIA) 100 mg tablet Comments:  
Reason for Stopping:   
   
  
  
Current Facility-Administered Medications:  
  LORazepam (INTENSOL) 2 mg/mL oral concentrate 0.5 mg, 0.5 mg, SubLINGual, TID, Yelena VIRK, NP, 0.5 mg at 08/18/20 0831 
  ondansetron (ZOFRAN) injection 4 mg, 4 mg, IntraVENous, Q4H PRN, Brant Gaming F, NP 
  LORazepam (INTENSOL) 2 mg/mL oral concentrate 0.5 mg, 0.5 mg, SubLINGual, Q4H PRN, Brant [employer] F, NP, 0.5 mg at 08/06/20 0500   acetaminophen (TYLENOL) suppository 650 mg, 650 mg, Rectal, Q4H PRN, Marco Rousseau, NP 
  bisacodyL (DULCOLAX) suppository 10 mg, 10 mg, Rectal, DAILY PRN, Marco Rousseau, NP 
  morphine (ROXANOL) concentrated oral syringe 2.6 mg, 2.6 mg, SubLINGual, Q4H PRN, Barnt Gaming F, NP, 2.6 mg at 08/13/20 1700 · It is important that you take the medication exactly as they are prescribed. · Keep your medication in the bottles provided by the pharmacist and keep a list of the medication names, dosages, and times to be taken in your wallet. · Do not take other medications without consulting your doctor. Discharge To: Home hospice Minutes spent on discharge: 55 minutes spent coordinating this discharge (review instructions/follow-up, prescriptions, preparing report for sign off) Vivien Castillo MD 
8/18/2020 10:51 AM

## 2020-08-18 NOTE — PROGRESS NOTES
Pt has been accepted to [facility] agency. Discussed with daughter Nixon Borja and she is aware of the transition plan today. Transport has been arranged through [facility] for 1030am. CM spoke with Majo Tracey from [facility] care yesterday. Home hospice referral has been received and accepted. Home medical equipment has been ordered and the hospice nurse will be at the pt's home today for home hospice admisison. Updated bedside RN, to the transition plan. Discharge information has been documented on the AVS.  
  
  
Iain Pool RN BSN Care Manager [phone]

## 2020-08-18 NOTE — DISCHARGE INSTRUCTIONS
Patient Education        Learning About Hospice and Palliative Care  What are hospice and palliative care? Palliative (say \"PAL-nicci-uh-tiv\") care is an area of medicine that helps give you more good days by providing care for quality-of-life issues. It includes treating symptoms like pain, nausea, or sleep problems. It can also include helping you and your loved ones to:  · Understand your illness better. · Talk more openly about your feelings. · Decide what treatments you want or don't want. · Communicate better with your doctors, nurses, and each other. Hospice care is a type of palliative care. But it's for people who are near the end of life. What kinds of care are involved? Palliative care: This treatment helps you feel better physically, emotionally, and spiritually while doctors also treat your illness. Your care may include pain relief, counseling, or nutrition advice. Hospice care: Again, the goal of this type of care is to help you feel better. And it can help you get the most out of the time you have left. But you no longer get treatment to try to cure your illness. When does care happen? Palliative care: This care can happen at any time during a serious illness. You don't have to be near death to get this care. Hospice care: In most cases, you can choose hospice care when your doctor believes that you have no more than about 6 months to live. Where does the care happen? Palliative care: This care often happens in hospitals or long-term care facilities like nursing homes. It can take place wherever you are treated, even in your home. Hospice care: Most hospice care is done in the place the patient calls \"home. \" This is often the person's home. But it could also be a place like a nursing home or alf center. Hospice care may also be given in hospice centers, hospitals, and other places. Who provides the care? Palliative care:  There are doctors and nurses who specialize in this field. But your own doctor may also give some of this care. And there are many other experts who may help you. These include social workers, counselors, therapists, and nutrition experts. Hospice care: In hospitals, hospice centers, and other facilities, care is given by doctors, nurses, and others who are trained in hospice care. In the home, a family member is often the main caregiver. But the family member gets help from care experts. They are on call 24 hours a day. Where can you learn more? Go to http://martin-clary.info/  Enter D515 in the search box to learn more about \"Learning About Hospice and Palliative Care. \"  Current as of: December 9, 2019               Content Version: 12.5  © 3288-6907 [employer]. Care instructions adapted under license by [employer] (which disclaims liability or warranty for this information). If you have questions about a medical condition or this instruction, always ask your healthcare professional. [place] [address] any warranty or liability for your use of this information.

## 2020-09-09 ENCOUNTER — HOME CARE VISIT (OUTPATIENT)
Dept: HOSPICE | Facility: HOSPICE | Age: 85
End: 2020-09-09

## 2021-08-06 NOTE — ROUTINE PROCESS
Nursing Supervisor Shyann Gao stated they will work on moving the patient to another floor due to her positive covid status. Dr. Luz Elena Barillas on the floor at the time of notification from kalpana from microbiology. Night shift made aware. Dr. Luz Elena Barillas asked to put in orders for droplet plus isolation. n/a
